# Patient Record
Sex: FEMALE | Race: WHITE | NOT HISPANIC OR LATINO | Employment: FULL TIME | URBAN - METROPOLITAN AREA
[De-identification: names, ages, dates, MRNs, and addresses within clinical notes are randomized per-mention and may not be internally consistent; named-entity substitution may affect disease eponyms.]

---

## 2021-08-17 LAB
EXTERNAL ABO GROUPING: NORMAL
EXTERNAL ANTIBODY SCREEN: NORMAL
EXTERNAL HEMOGLOBIN: 12.7 G/DL
EXTERNAL HEPATITIS B SURFACE ANTIGEN: NEGATIVE
EXTERNAL HIV-1/2 AB-AG: NORMAL
EXTERNAL PLATELET COUNT: 269 K/ΜL
EXTERNAL RH FACTOR: POSITIVE
EXTERNAL RUBELLA IGG QUANTITATION: NORMAL
EXTERNAL SYPHILIS RPR SCREEN: NORMAL

## 2021-09-08 LAB
EXTERNAL CHLAMYDIA SCREEN: NORMAL
EXTERNAL GONORRHEA SCREEN: NORMAL

## 2021-12-12 ENCOUNTER — APPOINTMENT (INPATIENT)
Dept: CT IMAGING | Facility: HOSPITAL | Age: 36
DRG: 781 | End: 2021-12-12
Payer: COMMERCIAL

## 2021-12-12 ENCOUNTER — HOSPITAL ENCOUNTER (EMERGENCY)
Facility: HOSPITAL | Age: 36
End: 2021-12-12
Attending: EMERGENCY MEDICINE | Admitting: EMERGENCY MEDICINE
Payer: COMMERCIAL

## 2021-12-12 ENCOUNTER — APPOINTMENT (EMERGENCY)
Dept: RADIOLOGY | Facility: HOSPITAL | Age: 36
End: 2021-12-12
Payer: COMMERCIAL

## 2021-12-12 ENCOUNTER — HOSPITAL ENCOUNTER (INPATIENT)
Facility: HOSPITAL | Age: 36
LOS: 7 days | Discharge: HOME | DRG: 781 | End: 2021-12-19
Attending: OBSTETRICS & GYNECOLOGY | Admitting: OBSTETRICS & GYNECOLOGY
Payer: COMMERCIAL

## 2021-12-12 VITALS
TEMPERATURE: 99.7 F | HEART RATE: 99 BPM | WEIGHT: 197 LBS | HEIGHT: 65 IN | OXYGEN SATURATION: 94 % | BODY MASS INDEX: 32.82 KG/M2 | DIASTOLIC BLOOD PRESSURE: 61 MMHG | SYSTOLIC BLOOD PRESSURE: 107 MMHG | RESPIRATION RATE: 26 BRPM

## 2021-12-12 DIAGNOSIS — R06.02 SOB (SHORTNESS OF BREATH): Primary | ICD-10-CM

## 2021-12-12 DIAGNOSIS — U07.1 COVID: Primary | ICD-10-CM

## 2021-12-12 DIAGNOSIS — U07.1 COVID-19: ICD-10-CM

## 2021-12-12 DIAGNOSIS — Z3A.23 23 WEEKS GESTATION OF PREGNANCY: ICD-10-CM

## 2021-12-12 DIAGNOSIS — E87.6 ACUTE HYPOKALEMIA: ICD-10-CM

## 2021-12-12 PROBLEM — E88.819 INSULIN RESISTANCE: Status: ACTIVE | Noted: 2021-12-12

## 2021-12-12 PROBLEM — R55 SYNCOPAL EPISODES: Status: ACTIVE | Noted: 2021-12-12

## 2021-12-12 PROBLEM — Q23.81 BICUSPID AORTIC VALVE: Status: ACTIVE | Noted: 2021-08-11

## 2021-12-12 PROBLEM — I82.A11 DVT OF RIGHT AXILLARY VEIN, ACUTE (HCC): Status: ACTIVE | Noted: 2021-08-12

## 2021-12-12 PROBLEM — K21.9 GASTROESOPHAGEAL REFLUX DISEASE WITHOUT ESOPHAGITIS: Status: ACTIVE | Noted: 2021-08-11

## 2021-12-12 PROBLEM — E88.81 INSULIN RESISTANCE: Status: ACTIVE | Noted: 2021-12-12

## 2021-12-12 PROBLEM — O98.512 COVID-19 AFFECTING PREGNANCY IN SECOND TRIMESTER: Status: ACTIVE | Noted: 2021-12-12

## 2021-12-12 PROBLEM — G54.0 THORACIC OUTLET SYNDROME: Status: ACTIVE | Noted: 2021-08-11

## 2021-12-12 PROBLEM — Q23.1 BICUSPID AORTIC VALVE: Status: ACTIVE | Noted: 2021-08-11

## 2021-12-12 LAB
ALBUMIN SERPL BCP-MCNC: 2.6 G/DL (ref 3.5–5)
ALP SERPL-CCNC: 88 U/L (ref 46–116)
ALT SERPL W P-5'-P-CCNC: 27 U/L (ref 12–78)
ANION GAP SERPL CALCULATED.3IONS-SCNC: 11 MMOL/L (ref 4–13)
AST SERPL W P-5'-P-CCNC: 18 U/L (ref 5–45)
BASOPHILS # BLD AUTO: 0.01 THOUSANDS/ΜL (ref 0–0.1)
BASOPHILS NFR BLD AUTO: 0 % (ref 0–1)
BILIRUB SERPL-MCNC: 0.4 MG/DL (ref 0.2–1)
BUN SERPL-MCNC: 6 MG/DL (ref 5–25)
CALCIUM ALBUM COR SERPL-MCNC: 9 MG/DL (ref 8.3–10.1)
CALCIUM SERPL-MCNC: 7.9 MG/DL (ref 8.3–10.1)
CHLORIDE SERPL-SCNC: 100 MMOL/L (ref 100–108)
CO2 SERPL-SCNC: 24 MMOL/L (ref 21–32)
CREAT SERPL-MCNC: 0.57 MG/DL (ref 0.6–1.3)
D DIMER PPP FEU-MCNC: 0.89 UG/ML FEU
EOSINOPHIL # BLD AUTO: 0.01 THOUSAND/ΜL (ref 0–0.61)
EOSINOPHIL NFR BLD AUTO: 0 % (ref 0–6)
ERYTHROCYTE [DISTWIDTH] IN BLOOD BY AUTOMATED COUNT: 12.9 % (ref 11.6–15.1)
GFR SERPL CREATININE-BSD FRML MDRD: 119 ML/MIN/1.73SQ M
GLUCOSE SERPL-MCNC: 104 MG/DL (ref 65–140)
GLUCOSE SERPL-MCNC: 120 MG/DL (ref 65–140)
HCT VFR BLD AUTO: 35.3 % (ref 34.8–46.1)
HGB BLD-MCNC: 11.7 G/DL (ref 11.5–15.4)
IMM GRANULOCYTES # BLD AUTO: 0.05 THOUSAND/UL (ref 0–0.2)
IMM GRANULOCYTES NFR BLD AUTO: 1 % (ref 0–2)
INR PPP: 1.04 (ref 0.84–1.19)
LYMPHOCYTES # BLD AUTO: 0.68 THOUSANDS/ΜL (ref 0.6–4.47)
LYMPHOCYTES NFR BLD AUTO: 8 % (ref 14–44)
MAGNESIUM SERPL-MCNC: 1.7 MG/DL (ref 1.6–2.6)
MCH RBC QN AUTO: 30.9 PG (ref 26.8–34.3)
MCHC RBC AUTO-ENTMCNC: 33.1 G/DL (ref 31.4–37.4)
MCV RBC AUTO: 93 FL (ref 82–98)
MONOCYTES # BLD AUTO: 0.65 THOUSAND/ΜL (ref 0.17–1.22)
MONOCYTES NFR BLD AUTO: 7 % (ref 4–12)
NEUTROPHILS # BLD AUTO: 7.69 THOUSANDS/ΜL (ref 1.85–7.62)
NEUTS SEG NFR BLD AUTO: 84 % (ref 43–75)
NRBC BLD AUTO-RTO: 0 /100 WBCS
PLATELET # BLD AUTO: 185 THOUSANDS/UL (ref 149–390)
PMV BLD AUTO: 10.9 FL (ref 8.9–12.7)
POTASSIUM SERPL-SCNC: 3.3 MMOL/L (ref 3.5–5.3)
PROT SERPL-MCNC: 6.8 G/DL (ref 6.4–8.2)
PROTHROMBIN TIME: 13.5 SECONDS (ref 11.6–14.5)
RBC # BLD AUTO: 3.79 MILLION/UL (ref 3.81–5.12)
SODIUM SERPL-SCNC: 135 MMOL/L (ref 136–145)
TSH SERPL DL<=0.05 MIU/L-ACNC: 0.84 UIU/ML (ref 0.36–3.74)
WBC # BLD AUTO: 9.09 THOUSAND/UL (ref 4.31–10.16)

## 2021-12-12 PROCEDURE — 80053 COMPREHEN METABOLIC PANEL: CPT | Performed by: EMERGENCY MEDICINE

## 2021-12-12 PROCEDURE — 84443 ASSAY THYROID STIM HORMONE: CPT | Performed by: EMERGENCY MEDICINE

## 2021-12-12 PROCEDURE — 83735 ASSAY OF MAGNESIUM: CPT | Performed by: EMERGENCY MEDICINE

## 2021-12-12 PROCEDURE — 85610 PROTHROMBIN TIME: CPT | Performed by: EMERGENCY MEDICINE

## 2021-12-12 PROCEDURE — NC001 PR NO CHARGE: Performed by: OBSTETRICS & GYNECOLOGY

## 2021-12-12 PROCEDURE — 82948 REAGENT STRIP/BLOOD GLUCOSE: CPT

## 2021-12-12 PROCEDURE — 99285 EMERGENCY DEPT VISIT HI MDM: CPT

## 2021-12-12 PROCEDURE — 76815 OB US LIMITED FETUS(S): CPT | Performed by: EMERGENCY MEDICINE

## 2021-12-12 PROCEDURE — 96374 THER/PROPH/DIAG INJ IV PUSH: CPT

## 2021-12-12 PROCEDURE — 96361 HYDRATE IV INFUSION ADD-ON: CPT

## 2021-12-12 PROCEDURE — 93005 ELECTROCARDIOGRAM TRACING: CPT

## 2021-12-12 PROCEDURE — 71045 X-RAY EXAM CHEST 1 VIEW: CPT

## 2021-12-12 PROCEDURE — 36415 COLL VENOUS BLD VENIPUNCTURE: CPT | Performed by: EMERGENCY MEDICINE

## 2021-12-12 PROCEDURE — XW033E5 INTRODUCTION OF REMDESIVIR ANTI-INFECTIVE INTO PERIPHERAL VEIN, PERCUTANEOUS APPROACH, NEW TECHNOLOGY GROUP 5: ICD-10-PCS | Performed by: OBSTETRICS & GYNECOLOGY

## 2021-12-12 PROCEDURE — 99285 EMERGENCY DEPT VISIT HI MDM: CPT | Performed by: EMERGENCY MEDICINE

## 2021-12-12 PROCEDURE — 99253 IP/OBS CNSLTJ NEW/EST LOW 45: CPT | Performed by: INTERNAL MEDICINE

## 2021-12-12 PROCEDURE — 85379 FIBRIN DEGRADATION QUANT: CPT | Performed by: EMERGENCY MEDICINE

## 2021-12-12 PROCEDURE — 85025 COMPLETE CBC W/AUTO DIFF WBC: CPT | Performed by: EMERGENCY MEDICINE

## 2021-12-12 PROCEDURE — 4A1HXCZ MONITORING OF PRODUCTS OF CONCEPTION, CARDIAC RATE, EXTERNAL APPROACH: ICD-10-PCS | Performed by: OBSTETRICS & GYNECOLOGY

## 2021-12-12 RX ORDER — DIPHENHYDRAMINE HYDROCHLORIDE 25 MG/1
CAPSULE ORAL
COMMUNITY
Start: 2021-08-01 | End: 2022-01-21

## 2021-12-12 RX ORDER — HEPARIN SODIUM 1000 [USP'U]/ML
6800 INJECTION, SOLUTION INTRAVENOUS; SUBCUTANEOUS
Status: DISCONTINUED | OUTPATIENT
Start: 2021-12-12 | End: 2021-12-13

## 2021-12-12 RX ORDER — HEPARIN SODIUM 1000 [USP'U]/ML
3400 INJECTION, SOLUTION INTRAVENOUS; SUBCUTANEOUS
Status: DISCONTINUED | OUTPATIENT
Start: 2021-12-12 | End: 2021-12-13

## 2021-12-12 RX ORDER — LEVOTHYROXINE SODIUM 0.05 MG/1
50 TABLET ORAL
Status: DISCONTINUED | OUTPATIENT
Start: 2021-12-13 | End: 2021-12-19 | Stop reason: HOSPADM

## 2021-12-12 RX ORDER — HEPARIN SODIUM 5000 [USP'U]/ML
5000 INJECTION, SOLUTION INTRAVENOUS; SUBCUTANEOUS EVERY 8 HOURS SCHEDULED
Status: DISCONTINUED | OUTPATIENT
Start: 2021-12-12 | End: 2021-12-12

## 2021-12-12 RX ORDER — METHYLPREDNISOLONE 16 MG/1
32 TABLET ORAL
Status: DISCONTINUED | OUTPATIENT
Start: 2021-12-13 | End: 2021-12-13

## 2021-12-12 RX ORDER — LEVOTHYROXINE SODIUM 0.05 MG/1
50 TABLET ORAL
COMMUNITY
Start: 2021-08-17 | End: 2022-02-18

## 2021-12-12 RX ORDER — ONDANSETRON 2 MG/ML
4 INJECTION INTRAMUSCULAR; INTRAVENOUS ONCE
Status: COMPLETED | OUTPATIENT
Start: 2021-12-12 | End: 2021-12-12

## 2021-12-12 RX ORDER — GUAIFENESIN 600 MG
600 TABLET, EXTENDED RELEASE 12 HR ORAL EVERY 12 HOURS SCHEDULED
Status: DISCONTINUED | OUTPATIENT
Start: 2021-12-12 | End: 2021-12-19 | Stop reason: HOSPADM

## 2021-12-12 RX ORDER — PANTOPRAZOLE SODIUM 20 MG/1
20 TABLET, DELAYED RELEASE ORAL
Status: DISCONTINUED | OUTPATIENT
Start: 2021-12-13 | End: 2021-12-19 | Stop reason: HOSPADM

## 2021-12-12 RX ORDER — DEXAMETHASONE SODIUM PHOSPHATE 4 MG/ML
6 INJECTION, SOLUTION INTRA-ARTICULAR; INTRALESIONAL; INTRAMUSCULAR; INTRAVENOUS; SOFT TISSUE EVERY 24 HOURS
Status: DISCONTINUED | OUTPATIENT
Start: 2021-12-12 | End: 2021-12-19 | Stop reason: HOSPADM

## 2021-12-12 RX ORDER — OMEPRAZOLE 20 MG/1
20 CAPSULE, DELAYED RELEASE ORAL
COMMUNITY
Start: 2021-01-01

## 2021-12-12 RX ORDER — HEPARIN SODIUM 1000 [USP'U]/ML
6800 INJECTION, SOLUTION INTRAVENOUS; SUBCUTANEOUS ONCE
Status: COMPLETED | OUTPATIENT
Start: 2021-12-13 | End: 2021-12-13

## 2021-12-12 RX ORDER — ACETAMINOPHEN 325 MG/1
975 TABLET ORAL ONCE
Status: COMPLETED | OUTPATIENT
Start: 2021-12-12 | End: 2021-12-12

## 2021-12-12 RX ORDER — POTASSIUM CHLORIDE 20 MEQ/1
20 TABLET, EXTENDED RELEASE ORAL ONCE
Status: COMPLETED | OUTPATIENT
Start: 2021-12-12 | End: 2021-12-12

## 2021-12-12 RX ORDER — DIPHENHYDRAMINE HCL 25 MG
50 TABLET ORAL
Status: DISCONTINUED | OUTPATIENT
Start: 2021-12-13 | End: 2021-12-13

## 2021-12-12 RX ORDER — AZELASTINE HYDROCHLORIDE, FLUTICASONE PROPIONATE 137; 50 UG/1; UG/1
SPRAY, METERED NASAL
COMMUNITY
Start: 2021-06-25 | End: 2022-01-21

## 2021-12-12 RX ORDER — HEPARIN SODIUM 10000 [USP'U]/100ML
3-30 INJECTION, SOLUTION INTRAVENOUS
Status: DISCONTINUED | OUTPATIENT
Start: 2021-12-13 | End: 2021-12-13

## 2021-12-12 RX ADMIN — SODIUM CHLORIDE 1000 ML: 0.9 INJECTION, SOLUTION INTRAVENOUS at 14:01

## 2021-12-12 RX ADMIN — ACETAMINOPHEN 975 MG: 325 TABLET, FILM COATED ORAL at 14:00

## 2021-12-12 RX ADMIN — ONDANSETRON 4 MG: 2 INJECTION INTRAMUSCULAR; INTRAVENOUS at 14:00

## 2021-12-12 RX ADMIN — POTASSIUM CHLORIDE 20 MEQ: 1500 TABLET, EXTENDED RELEASE ORAL at 15:17

## 2021-12-13 ENCOUNTER — APPOINTMENT (INPATIENT)
Dept: NON INVASIVE DIAGNOSTICS | Facility: HOSPITAL | Age: 36
DRG: 781 | End: 2021-12-13
Payer: COMMERCIAL

## 2021-12-13 ENCOUNTER — APPOINTMENT (INPATIENT)
Dept: VASCULAR ULTRASOUND | Facility: HOSPITAL | Age: 36
DRG: 781 | End: 2021-12-13
Payer: COMMERCIAL

## 2021-12-13 ENCOUNTER — APPOINTMENT (INPATIENT)
Dept: RADIOLOGY | Facility: HOSPITAL | Age: 36
DRG: 781 | End: 2021-12-13
Payer: COMMERCIAL

## 2021-12-13 PROBLEM — Z86.718 HISTORY OF DVT (DEEP VEIN THROMBOSIS): Status: ACTIVE | Noted: 2021-08-12

## 2021-12-13 LAB
2HR DELTA HS TROPONIN: -1 NG/L
4HR DELTA HS TROPONIN: -1 NG/L
ABO GROUP BLD: NORMAL
ABO GROUP BLD: NORMAL
ALBUMIN SERPL BCP-MCNC: 2.1 G/DL (ref 3.5–5)
ALP SERPL-CCNC: 71 U/L (ref 46–116)
ALT SERPL W P-5'-P-CCNC: 22 U/L (ref 12–78)
ANION GAP SERPL CALCULATED.3IONS-SCNC: 10 MMOL/L (ref 4–13)
AORTIC ROOT: 2.7 CM
AORTIC VALVE MEAN VELOCITY: 14.6 M/S
APICAL FOUR CHAMBER EJECTION FRACTION: 57 %
APTT PPP: 109 SECONDS (ref 23–37)
APTT PPP: 33 SECONDS (ref 23–37)
ASCENDING AORTA: 3 CM
AST SERPL W P-5'-P-CCNC: 21 U/L (ref 5–45)
ATRIAL RATE: 104 BPM
AV AREA BY CONTINUOUS VTI: 1.7 CM2
AV AREA PEAK VELOCITY: 1.9 CM2
AV LVOT MEAN GRADIENT: 4 MMHG
AV LVOT PEAK GRADIENT: 5 MMHG
AV MEAN GRADIENT: 9 MMHG
AV PEAK GRADIENT: 14 MMHG
AV VALVE AREA: 1.74 CM2
BASOPHILS # BLD AUTO: 0.01 THOUSANDS/ΜL (ref 0–0.1)
BASOPHILS NFR BLD AUTO: 0 % (ref 0–1)
BILIRUB SERPL-MCNC: 0.4 MG/DL (ref 0.2–1)
BLD GP AB SCN SERPL QL: NEGATIVE
BUN SERPL-MCNC: 5 MG/DL (ref 5–25)
CALCIUM ALBUM COR SERPL-MCNC: 9.4 MG/DL (ref 8.3–10.1)
CALCIUM SERPL-MCNC: 7.9 MG/DL (ref 8.3–10.1)
CARDIAC TROPONIN I PNL SERPL HS: 2 NG/L
CARDIAC TROPONIN I PNL SERPL HS: 2 NG/L
CARDIAC TROPONIN I PNL SERPL HS: 3 NG/L
CHLORIDE SERPL-SCNC: 103 MMOL/L (ref 100–108)
CK SERPL-CCNC: 60 U/L (ref 26–192)
CO2 SERPL-SCNC: 21 MMOL/L (ref 21–32)
CREAT SERPL-MCNC: 0.6 MG/DL (ref 0.6–1.3)
CRP SERPL QL: 143 MG/L
DOP CALC AO VTI: 40.64 CM
DOP CALC LVOT AREA: 3.14 CM2
DOP CALC LVOT DIAMETER: 2 CM
DOP CALC LVOT PEAK VEL VTI: 22.56 CM
DOP CALC LVOT PEAK VEL: 1.16 M/S
DOP CALC LVOT STROKE INDEX: 36 ML/M2
DOP CALC LVOT STROKE VOLUME: 70.84 CM3
E WAVE DECELERATION TIME: 133 MS
EOSINOPHIL # BLD AUTO: 0.01 THOUSAND/ΜL (ref 0–0.61)
EOSINOPHIL NFR BLD AUTO: 0 % (ref 0–6)
ERYTHROCYTE [DISTWIDTH] IN BLOOD BY AUTOMATED COUNT: 13.1 % (ref 11.6–15.1)
FRACTIONAL SHORTENING: 27 % (ref 28–44)
GFR SERPL CREATININE-BSD FRML MDRD: 117 ML/MIN/1.73SQ M
GLUCOSE SERPL-MCNC: 118 MG/DL (ref 65–140)
GLUCOSE SERPL-MCNC: 127 MG/DL (ref 65–140)
GLUCOSE SERPL-MCNC: 135 MG/DL (ref 65–140)
GLUCOSE SERPL-MCNC: 169 MG/DL (ref 65–140)
GLUCOSE SERPL-MCNC: 171 MG/DL (ref 65–140)
GLUCOSE SERPL-MCNC: 177 MG/DL (ref 65–140)
GLUCOSE SERPL-MCNC: 199 MG/DL (ref 65–140)
HCT VFR BLD AUTO: 30.7 % (ref 34.8–46.1)
HGB BLD-MCNC: 10.2 G/DL (ref 11.5–15.4)
IMM GRANULOCYTES # BLD AUTO: 0.05 THOUSAND/UL (ref 0–0.2)
IMM GRANULOCYTES NFR BLD AUTO: 1 % (ref 0–2)
INTERVENTRICULAR SEPTUM IN DIASTOLE (PARASTERNAL SHORT AXIS VIEW): 0.8 CM
LEFT ATRIUM AREA SYSTOLE SINGLE PLANE A4C: 19.8 CM2
LEFT INTERNAL DIMENSION IN SYSTOLE: 3.8 CM (ref 2.1–4)
LEFT VENTRICULAR INTERNAL DIMENSION IN DIASTOLE: 5.2 CM (ref 5.1–7.59)
LEFT VENTRICULAR POSTERIOR WALL IN END DIASTOLE: 0.8 CM
LEFT VENTRICULAR STROKE VOLUME: 69 ML
LYMPHOCYTES # BLD AUTO: 0.64 THOUSANDS/ΜL (ref 0.6–4.47)
LYMPHOCYTES NFR BLD AUTO: 10 % (ref 14–44)
MCH RBC QN AUTO: 31.6 PG (ref 26.8–34.3)
MCHC RBC AUTO-ENTMCNC: 33.2 G/DL (ref 31.4–37.4)
MCV RBC AUTO: 95 FL (ref 82–98)
MONOCYTES # BLD AUTO: 0.47 THOUSAND/ΜL (ref 0.17–1.22)
MONOCYTES NFR BLD AUTO: 8 % (ref 4–12)
MV E'TISSUE VEL-SEP: 10 CM/S
MV PEAK A VEL: 0.74 M/S
MV PEAK E VEL: 104 CM/S
MV STENOSIS PRESSURE HALF TIME: 0 MS
NEUTROPHILS # BLD AUTO: 5.07 THOUSANDS/ΜL (ref 1.85–7.62)
NEUTS SEG NFR BLD AUTO: 81 % (ref 43–75)
NRBC BLD AUTO-RTO: 0 /100 WBCS
NT-PROBNP SERPL-MCNC: 77 PG/ML
P AXIS: 12 DEGREES
PLATELET # BLD AUTO: 161 THOUSANDS/UL (ref 149–390)
PMV BLD AUTO: 10.6 FL (ref 8.9–12.7)
POTASSIUM SERPL-SCNC: 3.5 MMOL/L (ref 3.5–5.3)
PR INTERVAL: 128 MS
PROCALCITONIN SERPL-MCNC: <0.05 NG/ML
PROCALCITONIN SERPL-MCNC: <0.05 NG/ML
PROT SERPL-MCNC: 5.9 G/DL (ref 6.4–8.2)
QRS AXIS: 19 DEGREES
QRSD INTERVAL: 82 MS
QT INTERVAL: 328 MS
QTC INTERVAL: 431 MS
RBC # BLD AUTO: 3.23 MILLION/UL (ref 3.81–5.12)
RH BLD: POSITIVE
RH BLD: POSITIVE
RIGHT ATRIUM AREA SYSTOLE A4C: 15 CM2
SL CV LV EF: 65
SL CV PED ECHO LEFT VENTRICLE DIASTOLIC VOLUME (MOD BIPLANE) 2D: 132 ML
SL CV PED ECHO LEFT VENTRICLE SYSTOLIC VOLUME (MOD BIPLANE) 2D: 63 ML
SODIUM SERPL-SCNC: 134 MMOL/L (ref 136–145)
SPECIMEN EXPIRATION DATE: NORMAL
T WAVE AXIS: -5 DEGREES
TRICUSPID VALVE S': 1.2 CM/S
VENTRICULAR RATE: 104 BPM
WBC # BLD AUTO: 6.25 THOUSAND/UL (ref 4.31–10.16)
Z-SCORE OF LEFT VENTRICULAR DIMENSION IN END SYSTOLE: -1.79

## 2021-12-13 PROCEDURE — 82550 ASSAY OF CK (CPK): CPT

## 2021-12-13 PROCEDURE — 71045 X-RAY EXAM CHEST 1 VIEW: CPT

## 2021-12-13 PROCEDURE — 85730 THROMBOPLASTIN TIME PARTIAL: CPT

## 2021-12-13 PROCEDURE — 93970 EXTREMITY STUDY: CPT

## 2021-12-13 PROCEDURE — 93970 EXTREMITY STUDY: CPT | Performed by: SURGERY

## 2021-12-13 PROCEDURE — 86900 BLOOD TYPING SEROLOGIC ABO: CPT

## 2021-12-13 PROCEDURE — 83880 ASSAY OF NATRIURETIC PEPTIDE: CPT

## 2021-12-13 PROCEDURE — 86901 BLOOD TYPING SEROLOGIC RH(D): CPT

## 2021-12-13 PROCEDURE — 93005 ELECTROCARDIOGRAM TRACING: CPT

## 2021-12-13 PROCEDURE — 85025 COMPLETE CBC W/AUTO DIFF WBC: CPT

## 2021-12-13 PROCEDURE — 93325 DOPPLER ECHO COLOR FLOW MAPG: CPT | Performed by: INTERNAL MEDICINE

## 2021-12-13 PROCEDURE — 93321 DOPPLER ECHO F-UP/LMTD STD: CPT | Performed by: INTERNAL MEDICINE

## 2021-12-13 PROCEDURE — 84145 PROCALCITONIN (PCT): CPT

## 2021-12-13 PROCEDURE — NC001 PR NO CHARGE: Performed by: OBSTETRICS & GYNECOLOGY

## 2021-12-13 PROCEDURE — 86850 RBC ANTIBODY SCREEN: CPT

## 2021-12-13 PROCEDURE — 93010 ELECTROCARDIOGRAM REPORT: CPT | Performed by: INTERNAL MEDICINE

## 2021-12-13 PROCEDURE — 99254 IP/OBS CNSLTJ NEW/EST MOD 60: CPT | Performed by: OBSTETRICS & GYNECOLOGY

## 2021-12-13 PROCEDURE — 99232 SBSQ HOSP IP/OBS MODERATE 35: CPT | Performed by: INTERNAL MEDICINE

## 2021-12-13 PROCEDURE — 93321 DOPPLER ECHO F-UP/LMTD STD: CPT

## 2021-12-13 PROCEDURE — 93325 DOPPLER ECHO COLOR FLOW MAPG: CPT

## 2021-12-13 PROCEDURE — 86140 C-REACTIVE PROTEIN: CPT

## 2021-12-13 PROCEDURE — 93308 TTE F-UP OR LMTD: CPT

## 2021-12-13 PROCEDURE — 93308 TTE F-UP OR LMTD: CPT | Performed by: INTERNAL MEDICINE

## 2021-12-13 PROCEDURE — 84484 ASSAY OF TROPONIN QUANT: CPT

## 2021-12-13 PROCEDURE — 82948 REAGENT STRIP/BLOOD GLUCOSE: CPT

## 2021-12-13 PROCEDURE — 80053 COMPREHEN METABOLIC PANEL: CPT

## 2021-12-13 RX ORDER — ATORVASTATIN CALCIUM 40 MG/1
40 TABLET, FILM COATED ORAL
Status: CANCELLED | OUTPATIENT
Start: 2021-12-13

## 2021-12-13 RX ORDER — GUAIFENESIN/DEXTROMETHORPHAN 100-10MG/5
10 SYRUP ORAL EVERY 4 HOURS PRN
Status: DISCONTINUED | OUTPATIENT
Start: 2021-12-13 | End: 2021-12-13

## 2021-12-13 RX ORDER — ECHINACEA PURPUREA EXTRACT 125 MG
1 TABLET ORAL
Status: DISCONTINUED | OUTPATIENT
Start: 2021-12-13 | End: 2021-12-19 | Stop reason: HOSPADM

## 2021-12-13 RX ORDER — BENZONATATE 100 MG/1
100 CAPSULE ORAL 3 TIMES DAILY PRN
Status: DISCONTINUED | OUTPATIENT
Start: 2021-12-13 | End: 2021-12-19 | Stop reason: HOSPADM

## 2021-12-13 RX ORDER — INSULIN GLARGINE 100 [IU]/ML
20 INJECTION, SOLUTION SUBCUTANEOUS
Status: DISCONTINUED | OUTPATIENT
Start: 2021-12-13 | End: 2021-12-19 | Stop reason: HOSPADM

## 2021-12-13 RX ORDER — GUAIFENESIN 100 MG/5ML
200 SOLUTION ORAL EVERY 4 HOURS PRN
Status: DISCONTINUED | OUTPATIENT
Start: 2021-12-13 | End: 2021-12-19 | Stop reason: HOSPADM

## 2021-12-13 RX ORDER — ACETAMINOPHEN 325 MG/1
650 TABLET ORAL EVERY 6 HOURS PRN
Status: DISCONTINUED | OUTPATIENT
Start: 2021-12-13 | End: 2021-12-19 | Stop reason: HOSPADM

## 2021-12-13 RX ADMIN — GUAIFENESIN 600 MG: 600 TABLET, EXTENDED RELEASE ORAL at 08:21

## 2021-12-13 RX ADMIN — GUAIFENESIN 600 MG: 600 TABLET, EXTENDED RELEASE ORAL at 21:33

## 2021-12-13 RX ADMIN — ACETAMINOPHEN 650 MG: 325 TABLET, FILM COATED ORAL at 04:10

## 2021-12-13 RX ADMIN — HEPARIN SODIUM 18 UNITS/KG/HR: 10000 INJECTION, SOLUTION INTRAVENOUS at 01:31

## 2021-12-13 RX ADMIN — DEXAMETHASONE SODIUM PHOSPHATE 6 MG: 4 INJECTION INTRA-ARTICULAR; INTRALESIONAL; INTRAMUSCULAR; INTRAVENOUS; SOFT TISSUE at 12:28

## 2021-12-13 RX ADMIN — INSULIN GLARGINE 20 UNITS: 100 INJECTION, SOLUTION SUBCUTANEOUS at 21:40

## 2021-12-13 RX ADMIN — Medication 1 TABLET: at 05:31

## 2021-12-13 RX ADMIN — ENOXAPARIN SODIUM 30 MG: 30 INJECTION SUBCUTANEOUS at 14:49

## 2021-12-13 RX ADMIN — BENZONATATE 100 MG: 100 CAPSULE ORAL at 14:34

## 2021-12-13 RX ADMIN — BENZONATATE 100 MG: 100 CAPSULE ORAL at 21:39

## 2021-12-13 RX ADMIN — HEPARIN SODIUM 6800 UNITS: 1000 INJECTION INTRAVENOUS; SUBCUTANEOUS at 01:22

## 2021-12-13 RX ADMIN — LEVOTHYROXINE SODIUM 50 MCG: 50 TABLET ORAL at 05:31

## 2021-12-13 RX ADMIN — REMDESIVIR 200 MG: 100 INJECTION, POWDER, LYOPHILIZED, FOR SOLUTION INTRAVENOUS at 00:34

## 2021-12-13 RX ADMIN — PANTOPRAZOLE SODIUM 20 MG: 20 TABLET, DELAYED RELEASE ORAL at 05:31

## 2021-12-13 RX ADMIN — REMDESIVIR 100 MG: 100 INJECTION, POWDER, LYOPHILIZED, FOR SOLUTION INTRAVENOUS at 21:33

## 2021-12-13 RX ADMIN — METHYLPREDNISOLONE 32 MG: 16 TABLET ORAL at 01:15

## 2021-12-14 ENCOUNTER — APPOINTMENT (INPATIENT)
Dept: VASCULAR ULTRASOUND | Facility: HOSPITAL | Age: 36
DRG: 781 | End: 2021-12-14
Payer: COMMERCIAL

## 2021-12-14 LAB
ALBUMIN SERPL BCP-MCNC: 2.1 G/DL (ref 3.5–5)
ALP SERPL-CCNC: 76 U/L (ref 46–116)
ALT SERPL W P-5'-P-CCNC: 22 U/L (ref 12–78)
ANION GAP SERPL CALCULATED.3IONS-SCNC: 11 MMOL/L (ref 4–13)
AST SERPL W P-5'-P-CCNC: 20 U/L (ref 5–45)
BASOPHILS # BLD AUTO: 0.01 THOUSANDS/ΜL (ref 0–0.1)
BASOPHILS NFR BLD AUTO: 0 % (ref 0–1)
BILIRUB SERPL-MCNC: 0.24 MG/DL (ref 0.2–1)
BUN SERPL-MCNC: 10 MG/DL (ref 5–25)
CALCIUM ALBUM COR SERPL-MCNC: 9.4 MG/DL (ref 8.3–10.1)
CALCIUM SERPL-MCNC: 7.9 MG/DL (ref 8.3–10.1)
CHLORIDE SERPL-SCNC: 104 MMOL/L (ref 100–108)
CO2 SERPL-SCNC: 22 MMOL/L (ref 21–32)
CREAT SERPL-MCNC: 0.46 MG/DL (ref 0.6–1.3)
CRP SERPL QL: 118 MG/L
D DIMER PPP FEU-MCNC: 0.48 UG/ML FEU
EOSINOPHIL # BLD AUTO: 0 THOUSAND/ΜL (ref 0–0.61)
EOSINOPHIL NFR BLD AUTO: 0 % (ref 0–6)
ERYTHROCYTE [DISTWIDTH] IN BLOOD BY AUTOMATED COUNT: 13 % (ref 11.6–15.1)
GFR SERPL CREATININE-BSD FRML MDRD: 127 ML/MIN/1.73SQ M
GLUCOSE SERPL-MCNC: 109 MG/DL (ref 65–140)
GLUCOSE SERPL-MCNC: 109 MG/DL (ref 65–140)
GLUCOSE SERPL-MCNC: 117 MG/DL (ref 65–140)
GLUCOSE SERPL-MCNC: 121 MG/DL (ref 65–140)
GLUCOSE SERPL-MCNC: 156 MG/DL (ref 65–140)
HCT VFR BLD AUTO: 33.2 % (ref 34.8–46.1)
HGB BLD-MCNC: 11.1 G/DL (ref 11.5–15.4)
IMM GRANULOCYTES # BLD AUTO: 0.08 THOUSAND/UL (ref 0–0.2)
IMM GRANULOCYTES NFR BLD AUTO: 1 % (ref 0–2)
LYMPHOCYTES # BLD AUTO: 1.4 THOUSANDS/ΜL (ref 0.6–4.47)
LYMPHOCYTES NFR BLD AUTO: 14 % (ref 14–44)
MCH RBC QN AUTO: 31.7 PG (ref 26.8–34.3)
MCHC RBC AUTO-ENTMCNC: 33.4 G/DL (ref 31.4–37.4)
MCV RBC AUTO: 95 FL (ref 82–98)
MONOCYTES # BLD AUTO: 0.77 THOUSAND/ΜL (ref 0.17–1.22)
MONOCYTES NFR BLD AUTO: 8 % (ref 4–12)
NEUTROPHILS # BLD AUTO: 8.04 THOUSANDS/ΜL (ref 1.85–7.62)
NEUTS SEG NFR BLD AUTO: 77 % (ref 43–75)
NRBC BLD AUTO-RTO: 0 /100 WBCS
PLATELET # BLD AUTO: 182 THOUSANDS/UL (ref 149–390)
PMV BLD AUTO: 10.2 FL (ref 8.9–12.7)
POTASSIUM SERPL-SCNC: 3.5 MMOL/L (ref 3.5–5.3)
PROT SERPL-MCNC: 6 G/DL (ref 6.4–8.2)
RBC # BLD AUTO: 3.5 MILLION/UL (ref 3.81–5.12)
SODIUM SERPL-SCNC: 137 MMOL/L (ref 136–145)
WBC # BLD AUTO: 10.3 THOUSAND/UL (ref 4.31–10.16)

## 2021-12-14 PROCEDURE — 99223 1ST HOSP IP/OBS HIGH 75: CPT | Performed by: INTERNAL MEDICINE

## 2021-12-14 PROCEDURE — 85025 COMPLETE CBC W/AUTO DIFF WBC: CPT | Performed by: INTERNAL MEDICINE

## 2021-12-14 PROCEDURE — NC001 PR NO CHARGE: Performed by: OBSTETRICS & GYNECOLOGY

## 2021-12-14 PROCEDURE — XW0DXM6 INTRODUCTION OF BARICITINIB INTO MOUTH AND PHARYNX, EXTERNAL APPROACH, NEW TECHNOLOGY GROUP 6: ICD-10-PCS | Performed by: INTERNAL MEDICINE

## 2021-12-14 PROCEDURE — 80053 COMPREHEN METABOLIC PANEL: CPT | Performed by: INTERNAL MEDICINE

## 2021-12-14 PROCEDURE — 93970 EXTREMITY STUDY: CPT | Performed by: SURGERY

## 2021-12-14 PROCEDURE — 93970 EXTREMITY STUDY: CPT

## 2021-12-14 PROCEDURE — 86140 C-REACTIVE PROTEIN: CPT | Performed by: INTERNAL MEDICINE

## 2021-12-14 PROCEDURE — 99232 SBSQ HOSP IP/OBS MODERATE 35: CPT | Performed by: HOSPITALIST

## 2021-12-14 PROCEDURE — 85379 FIBRIN DEGRADATION QUANT: CPT | Performed by: INTERNAL MEDICINE

## 2021-12-14 PROCEDURE — 82948 REAGENT STRIP/BLOOD GLUCOSE: CPT

## 2021-12-14 PROCEDURE — 99233 SBSQ HOSP IP/OBS HIGH 50: CPT | Performed by: OBSTETRICS & GYNECOLOGY

## 2021-12-14 RX ADMIN — Medication 1 TABLET: at 05:55

## 2021-12-14 RX ADMIN — BENZONATATE 100 MG: 100 CAPSULE ORAL at 03:44

## 2021-12-14 RX ADMIN — GUAIFENESIN 600 MG: 600 TABLET, EXTENDED RELEASE ORAL at 21:05

## 2021-12-14 RX ADMIN — LEVOTHYROXINE SODIUM 50 MCG: 50 TABLET ORAL at 05:55

## 2021-12-14 RX ADMIN — INSULIN GLARGINE 20 UNITS: 100 INJECTION, SOLUTION SUBCUTANEOUS at 21:07

## 2021-12-14 RX ADMIN — DEXAMETHASONE SODIUM PHOSPHATE 6 MG: 4 INJECTION INTRA-ARTICULAR; INTRALESIONAL; INTRAMUSCULAR; INTRAVENOUS; SOFT TISSUE at 13:52

## 2021-12-14 RX ADMIN — REMDESIVIR 100 MG: 100 INJECTION, POWDER, LYOPHILIZED, FOR SOLUTION INTRAVENOUS at 23:49

## 2021-12-14 RX ADMIN — ENOXAPARIN SODIUM 30 MG: 30 INJECTION SUBCUTANEOUS at 00:54

## 2021-12-14 RX ADMIN — GUAIFENESIN 600 MG: 600 TABLET, EXTENDED RELEASE ORAL at 08:45

## 2021-12-14 RX ADMIN — ENOXAPARIN SODIUM 30 MG: 30 INJECTION SUBCUTANEOUS at 10:38

## 2021-12-14 RX ADMIN — ACETAMINOPHEN 650 MG: 325 TABLET, FILM COATED ORAL at 10:38

## 2021-12-14 RX ADMIN — PANTOPRAZOLE SODIUM 20 MG: 20 TABLET, DELAYED RELEASE ORAL at 05:55

## 2021-12-14 RX ADMIN — BARICITINIB 4 MG: 2 TABLET, FILM COATED ORAL at 13:53

## 2021-12-14 RX ADMIN — ENOXAPARIN SODIUM 30 MG: 30 INJECTION SUBCUTANEOUS at 23:49

## 2021-12-15 LAB
ALBUMIN SERPL BCP-MCNC: 2 G/DL (ref 3.5–5)
ALP SERPL-CCNC: 78 U/L (ref 46–116)
ALT SERPL W P-5'-P-CCNC: 26 U/L (ref 12–78)
ANION GAP SERPL CALCULATED.3IONS-SCNC: 7 MMOL/L (ref 4–13)
AST SERPL W P-5'-P-CCNC: 23 U/L (ref 5–45)
ATRIAL RATE: 105 BPM
BILIRUB SERPL-MCNC: 0.33 MG/DL (ref 0.2–1)
BUN SERPL-MCNC: 10 MG/DL (ref 5–25)
CALCIUM ALBUM COR SERPL-MCNC: 9.6 MG/DL (ref 8.3–10.1)
CALCIUM SERPL-MCNC: 8 MG/DL (ref 8.3–10.1)
CHLORIDE SERPL-SCNC: 105 MMOL/L (ref 100–108)
CO2 SERPL-SCNC: 27 MMOL/L (ref 21–32)
CREAT SERPL-MCNC: 0.57 MG/DL (ref 0.6–1.3)
CRP SERPL QL: 130 MG/L
D DIMER PPP FEU-MCNC: 0.43 UG/ML FEU
ERYTHROCYTE [DISTWIDTH] IN BLOOD BY AUTOMATED COUNT: 13 % (ref 11.6–15.1)
GFR SERPL CREATININE-BSD FRML MDRD: 118 ML/MIN/1.73SQ M
GLUCOSE SERPL-MCNC: 112 MG/DL (ref 65–140)
GLUCOSE SERPL-MCNC: 152 MG/DL (ref 65–140)
GLUCOSE SERPL-MCNC: 153 MG/DL (ref 65–140)
GLUCOSE SERPL-MCNC: 82 MG/DL (ref 65–140)
GLUCOSE SERPL-MCNC: 91 MG/DL (ref 65–140)
HCT VFR BLD AUTO: 31.6 % (ref 34.8–46.1)
HGB BLD-MCNC: 10.4 G/DL (ref 11.5–15.4)
MCH RBC QN AUTO: 31.7 PG (ref 26.8–34.3)
MCHC RBC AUTO-ENTMCNC: 32.9 G/DL (ref 31.4–37.4)
MCV RBC AUTO: 96 FL (ref 82–98)
P AXIS: 15 DEGREES
PLATELET # BLD AUTO: 194 THOUSANDS/UL (ref 149–390)
PMV BLD AUTO: 10.2 FL (ref 8.9–12.7)
POTASSIUM SERPL-SCNC: 3.7 MMOL/L (ref 3.5–5.3)
PR INTERVAL: 124 MS
PROT SERPL-MCNC: 5.8 G/DL (ref 6.4–8.2)
QRS AXIS: 21 DEGREES
QRSD INTERVAL: 82 MS
QT INTERVAL: 336 MS
QTC INTERVAL: 444 MS
RBC # BLD AUTO: 3.28 MILLION/UL (ref 3.81–5.12)
SODIUM SERPL-SCNC: 139 MMOL/L (ref 136–145)
T WAVE AXIS: -21 DEGREES
VENTRICULAR RATE: 105 BPM
WBC # BLD AUTO: 10.27 THOUSAND/UL (ref 4.31–10.16)

## 2021-12-15 PROCEDURE — 86140 C-REACTIVE PROTEIN: CPT

## 2021-12-15 PROCEDURE — 93010 ELECTROCARDIOGRAM REPORT: CPT | Performed by: INTERNAL MEDICINE

## 2021-12-15 PROCEDURE — 99233 SBSQ HOSP IP/OBS HIGH 50: CPT | Performed by: INTERNAL MEDICINE

## 2021-12-15 PROCEDURE — NC001 PR NO CHARGE: Performed by: OBSTETRICS & GYNECOLOGY

## 2021-12-15 PROCEDURE — 80053 COMPREHEN METABOLIC PANEL: CPT

## 2021-12-15 PROCEDURE — 85379 FIBRIN DEGRADATION QUANT: CPT

## 2021-12-15 PROCEDURE — 82948 REAGENT STRIP/BLOOD GLUCOSE: CPT

## 2021-12-15 PROCEDURE — 85027 COMPLETE CBC AUTOMATED: CPT

## 2021-12-15 PROCEDURE — 99232 SBSQ HOSP IP/OBS MODERATE 35: CPT | Performed by: HOSPITALIST

## 2021-12-15 RX ORDER — POLYETHYLENE GLYCOL 3350 17 G/17G
17 POWDER, FOR SOLUTION ORAL DAILY
Status: DISCONTINUED | OUTPATIENT
Start: 2021-12-15 | End: 2021-12-19 | Stop reason: HOSPADM

## 2021-12-15 RX ADMIN — LEVOTHYROXINE SODIUM 50 MCG: 50 TABLET ORAL at 06:24

## 2021-12-15 RX ADMIN — ACETAMINOPHEN 650 MG: 325 TABLET, FILM COATED ORAL at 09:22

## 2021-12-15 RX ADMIN — POLYETHYLENE GLYCOL 3350 17 G: 17 POWDER, FOR SOLUTION ORAL at 09:22

## 2021-12-15 RX ADMIN — GUAIFENESIN 600 MG: 600 TABLET, EXTENDED RELEASE ORAL at 21:53

## 2021-12-15 RX ADMIN — GUAIFENESIN 600 MG: 600 TABLET, EXTENDED RELEASE ORAL at 08:21

## 2021-12-15 RX ADMIN — INSULIN GLARGINE 20 UNITS: 100 INJECTION, SOLUTION SUBCUTANEOUS at 21:53

## 2021-12-15 RX ADMIN — REMDESIVIR 100 MG: 100 INJECTION, POWDER, LYOPHILIZED, FOR SOLUTION INTRAVENOUS at 23:09

## 2021-12-15 RX ADMIN — ENOXAPARIN SODIUM 30 MG: 30 INJECTION SUBCUTANEOUS at 12:14

## 2021-12-15 RX ADMIN — DEXAMETHASONE SODIUM PHOSPHATE 6 MG: 4 INJECTION INTRA-ARTICULAR; INTRALESIONAL; INTRAMUSCULAR; INTRAVENOUS; SOFT TISSUE at 12:14

## 2021-12-15 RX ADMIN — PANTOPRAZOLE SODIUM 20 MG: 20 TABLET, DELAYED RELEASE ORAL at 06:24

## 2021-12-15 RX ADMIN — Medication 1 TABLET: at 06:24

## 2021-12-16 LAB
ALBUMIN SERPL BCP-MCNC: 2 G/DL (ref 3.5–5)
ALP SERPL-CCNC: 85 U/L (ref 46–116)
ALT SERPL W P-5'-P-CCNC: 30 U/L (ref 12–78)
ANION GAP SERPL CALCULATED.3IONS-SCNC: 10 MMOL/L (ref 4–13)
AST SERPL W P-5'-P-CCNC: 25 U/L (ref 5–45)
BASOPHILS # BLD AUTO: 0.02 THOUSANDS/ΜL (ref 0–0.1)
BASOPHILS NFR BLD AUTO: 0 % (ref 0–1)
BILIRUB SERPL-MCNC: 0.27 MG/DL (ref 0.2–1)
BUN SERPL-MCNC: 11 MG/DL (ref 5–25)
CALCIUM ALBUM COR SERPL-MCNC: 9.6 MG/DL (ref 8.3–10.1)
CALCIUM SERPL-MCNC: 8 MG/DL (ref 8.3–10.1)
CHLORIDE SERPL-SCNC: 106 MMOL/L (ref 100–108)
CO2 SERPL-SCNC: 23 MMOL/L (ref 21–32)
CREAT SERPL-MCNC: 0.45 MG/DL (ref 0.6–1.3)
CRP SERPL QL: 118.8 MG/L
D DIMER PPP FEU-MCNC: 0.48 UG/ML FEU
EOSINOPHIL # BLD AUTO: 0.01 THOUSAND/ΜL (ref 0–0.61)
EOSINOPHIL NFR BLD AUTO: 0 % (ref 0–6)
ERYTHROCYTE [DISTWIDTH] IN BLOOD BY AUTOMATED COUNT: 12.8 % (ref 11.6–15.1)
GFR SERPL CREATININE-BSD FRML MDRD: 128 ML/MIN/1.73SQ M
GLUCOSE SERPL-MCNC: 153 MG/DL (ref 65–140)
GLUCOSE SERPL-MCNC: 201 MG/DL (ref 65–140)
GLUCOSE SERPL-MCNC: 332 MG/DL (ref 65–140)
GLUCOSE SERPL-MCNC: 85 MG/DL (ref 65–140)
GLUCOSE SERPL-MCNC: 87 MG/DL (ref 65–140)
HCT VFR BLD AUTO: 31.1 % (ref 34.8–46.1)
HGB BLD-MCNC: 10.4 G/DL (ref 11.5–15.4)
IMM GRANULOCYTES # BLD AUTO: 0.13 THOUSAND/UL (ref 0–0.2)
IMM GRANULOCYTES NFR BLD AUTO: 1 % (ref 0–2)
LYMPHOCYTES # BLD AUTO: 1.85 THOUSANDS/ΜL (ref 0.6–4.47)
LYMPHOCYTES NFR BLD AUTO: 17 % (ref 14–44)
MCH RBC QN AUTO: 31.7 PG (ref 26.8–34.3)
MCHC RBC AUTO-ENTMCNC: 33.4 G/DL (ref 31.4–37.4)
MCV RBC AUTO: 95 FL (ref 82–98)
MONOCYTES # BLD AUTO: 0.89 THOUSAND/ΜL (ref 0.17–1.22)
MONOCYTES NFR BLD AUTO: 8 % (ref 4–12)
NEUTROPHILS # BLD AUTO: 7.98 THOUSANDS/ΜL (ref 1.85–7.62)
NEUTS SEG NFR BLD AUTO: 74 % (ref 43–75)
NRBC BLD AUTO-RTO: 0 /100 WBCS
PLATELET # BLD AUTO: 223 THOUSANDS/UL (ref 149–390)
PMV BLD AUTO: 10.4 FL (ref 8.9–12.7)
POTASSIUM SERPL-SCNC: 3.5 MMOL/L (ref 3.5–5.3)
PROT SERPL-MCNC: 5.8 G/DL (ref 6.4–8.2)
RBC # BLD AUTO: 3.28 MILLION/UL (ref 3.81–5.12)
SODIUM SERPL-SCNC: 139 MMOL/L (ref 136–145)
WBC # BLD AUTO: 10.88 THOUSAND/UL (ref 4.31–10.16)

## 2021-12-16 PROCEDURE — 86140 C-REACTIVE PROTEIN: CPT

## 2021-12-16 PROCEDURE — 99232 SBSQ HOSP IP/OBS MODERATE 35: CPT | Performed by: HOSPITALIST

## 2021-12-16 PROCEDURE — 85025 COMPLETE CBC W/AUTO DIFF WBC: CPT

## 2021-12-16 PROCEDURE — 85379 FIBRIN DEGRADATION QUANT: CPT

## 2021-12-16 PROCEDURE — 99233 SBSQ HOSP IP/OBS HIGH 50: CPT | Performed by: INTERNAL MEDICINE

## 2021-12-16 PROCEDURE — 82948 REAGENT STRIP/BLOOD GLUCOSE: CPT

## 2021-12-16 PROCEDURE — 80053 COMPREHEN METABOLIC PANEL: CPT

## 2021-12-16 PROCEDURE — 99232 SBSQ HOSP IP/OBS MODERATE 35: CPT | Performed by: OBSTETRICS & GYNECOLOGY

## 2021-12-16 PROCEDURE — NC001 PR NO CHARGE: Performed by: OBSTETRICS & GYNECOLOGY

## 2021-12-16 RX ADMIN — POLYETHYLENE GLYCOL 3350 17 G: 17 POWDER, FOR SOLUTION ORAL at 09:01

## 2021-12-16 RX ADMIN — ENOXAPARIN SODIUM 30 MG: 30 INJECTION SUBCUTANEOUS at 23:12

## 2021-12-16 RX ADMIN — INSULIN GLARGINE 20 UNITS: 100 INJECTION, SOLUTION SUBCUTANEOUS at 21:37

## 2021-12-16 RX ADMIN — GUAIFENESIN 600 MG: 600 TABLET, EXTENDED RELEASE ORAL at 09:05

## 2021-12-16 RX ADMIN — LEVOTHYROXINE SODIUM 50 MCG: 50 TABLET ORAL at 05:39

## 2021-12-16 RX ADMIN — PANTOPRAZOLE SODIUM 20 MG: 20 TABLET, DELAYED RELEASE ORAL at 05:39

## 2021-12-16 RX ADMIN — ENOXAPARIN SODIUM 30 MG: 30 INJECTION SUBCUTANEOUS at 13:05

## 2021-12-16 RX ADMIN — REMDESIVIR 100 MG: 100 INJECTION, POWDER, LYOPHILIZED, FOR SOLUTION INTRAVENOUS at 21:39

## 2021-12-16 RX ADMIN — Medication 1 TABLET: at 05:39

## 2021-12-16 RX ADMIN — GUAIFENESIN 600 MG: 600 TABLET, EXTENDED RELEASE ORAL at 21:37

## 2021-12-16 RX ADMIN — DEXAMETHASONE SODIUM PHOSPHATE 6 MG: 4 INJECTION INTRA-ARTICULAR; INTRALESIONAL; INTRAMUSCULAR; INTRAVENOUS; SOFT TISSUE at 13:05

## 2021-12-17 LAB
ANION GAP SERPL CALCULATED.3IONS-SCNC: 12 MMOL/L (ref 4–13)
BUN SERPL-MCNC: 12 MG/DL (ref 5–25)
CALCIUM SERPL-MCNC: 8.2 MG/DL (ref 8.3–10.1)
CHLORIDE SERPL-SCNC: 104 MMOL/L (ref 100–108)
CO2 SERPL-SCNC: 21 MMOL/L (ref 21–32)
CREAT SERPL-MCNC: 0.48 MG/DL (ref 0.6–1.3)
D DIMER PPP FEU-MCNC: 0.56 UG/ML FEU
ERYTHROCYTE [DISTWIDTH] IN BLOOD BY AUTOMATED COUNT: 12.9 % (ref 11.6–15.1)
GFR SERPL CREATININE-BSD FRML MDRD: 125 ML/MIN/1.73SQ M
GLUCOSE SERPL-MCNC: 137 MG/DL (ref 65–140)
GLUCOSE SERPL-MCNC: 143 MG/DL (ref 65–140)
GLUCOSE SERPL-MCNC: 79 MG/DL (ref 65–140)
GLUCOSE SERPL-MCNC: 80 MG/DL (ref 65–140)
HCT VFR BLD AUTO: 32.2 % (ref 34.8–46.1)
HGB BLD-MCNC: 10.5 G/DL (ref 11.5–15.4)
MCH RBC QN AUTO: 31.3 PG (ref 26.8–34.3)
MCHC RBC AUTO-ENTMCNC: 32.6 G/DL (ref 31.4–37.4)
MCV RBC AUTO: 96 FL (ref 82–98)
PLATELET # BLD AUTO: 255 THOUSANDS/UL (ref 149–390)
PMV BLD AUTO: 10.2 FL (ref 8.9–12.7)
POTASSIUM SERPL-SCNC: 3.5 MMOL/L (ref 3.5–5.3)
RBC # BLD AUTO: 3.36 MILLION/UL (ref 3.81–5.12)
SODIUM SERPL-SCNC: 137 MMOL/L (ref 136–145)
WBC # BLD AUTO: 9.62 THOUSAND/UL (ref 4.31–10.16)

## 2021-12-17 PROCEDURE — 99231 SBSQ HOSP IP/OBS SF/LOW 25: CPT | Performed by: HOSPITALIST

## 2021-12-17 PROCEDURE — 99233 SBSQ HOSP IP/OBS HIGH 50: CPT | Performed by: INTERNAL MEDICINE

## 2021-12-17 PROCEDURE — 85027 COMPLETE CBC AUTOMATED: CPT

## 2021-12-17 PROCEDURE — 85379 FIBRIN DEGRADATION QUANT: CPT

## 2021-12-17 PROCEDURE — 99232 SBSQ HOSP IP/OBS MODERATE 35: CPT | Performed by: OBSTETRICS & GYNECOLOGY

## 2021-12-17 PROCEDURE — NC001 PR NO CHARGE: Performed by: OBSTETRICS & GYNECOLOGY

## 2021-12-17 PROCEDURE — 80048 BASIC METABOLIC PNL TOTAL CA: CPT

## 2021-12-17 PROCEDURE — 82948 REAGENT STRIP/BLOOD GLUCOSE: CPT

## 2021-12-17 RX ADMIN — PANTOPRAZOLE SODIUM 20 MG: 20 TABLET, DELAYED RELEASE ORAL at 05:27

## 2021-12-17 RX ADMIN — INSULIN GLARGINE 20 UNITS: 100 INJECTION, SOLUTION SUBCUTANEOUS at 21:53

## 2021-12-17 RX ADMIN — LEVOTHYROXINE SODIUM 50 MCG: 50 TABLET ORAL at 05:27

## 2021-12-17 RX ADMIN — GUAIFENESIN 600 MG: 600 TABLET, EXTENDED RELEASE ORAL at 21:49

## 2021-12-17 RX ADMIN — BENZONATATE 100 MG: 100 CAPSULE ORAL at 09:49

## 2021-12-17 RX ADMIN — Medication 1 TABLET: at 05:28

## 2021-12-17 RX ADMIN — GUAIFENESIN 600 MG: 600 TABLET, EXTENDED RELEASE ORAL at 09:49

## 2021-12-17 RX ADMIN — DEXAMETHASONE SODIUM PHOSPHATE 6 MG: 4 INJECTION INTRA-ARTICULAR; INTRALESIONAL; INTRAMUSCULAR; INTRAVENOUS; SOFT TISSUE at 12:18

## 2021-12-17 RX ADMIN — ACETAMINOPHEN 650 MG: 325 TABLET, FILM COATED ORAL at 09:49

## 2021-12-17 RX ADMIN — ENOXAPARIN SODIUM 30 MG: 30 INJECTION SUBCUTANEOUS at 12:18

## 2021-12-18 LAB
D DIMER PPP FEU-MCNC: 0.44 UG/ML FEU
GLUCOSE SERPL-MCNC: 110 MG/DL (ref 65–140)
GLUCOSE SERPL-MCNC: 123 MG/DL (ref 65–140)
GLUCOSE SERPL-MCNC: 186 MG/DL (ref 65–140)
GLUCOSE SERPL-MCNC: 73 MG/DL (ref 65–140)

## 2021-12-18 PROCEDURE — 85379 FIBRIN DEGRADATION QUANT: CPT

## 2021-12-18 PROCEDURE — 82948 REAGENT STRIP/BLOOD GLUCOSE: CPT

## 2021-12-18 PROCEDURE — NC001 PR NO CHARGE: Performed by: OBSTETRICS & GYNECOLOGY

## 2021-12-18 PROCEDURE — 99232 SBSQ HOSP IP/OBS MODERATE 35: CPT | Performed by: OBSTETRICS & GYNECOLOGY

## 2021-12-18 RX ADMIN — ENOXAPARIN SODIUM 30 MG: 30 INJECTION SUBCUTANEOUS at 13:05

## 2021-12-18 RX ADMIN — GUAIFENESIN 600 MG: 600 TABLET, EXTENDED RELEASE ORAL at 21:24

## 2021-12-18 RX ADMIN — GUAIFENESIN 600 MG: 600 TABLET, EXTENDED RELEASE ORAL at 09:03

## 2021-12-18 RX ADMIN — Medication 1 TABLET: at 05:59

## 2021-12-18 RX ADMIN — DEXAMETHASONE SODIUM PHOSPHATE 6 MG: 4 INJECTION INTRA-ARTICULAR; INTRALESIONAL; INTRAMUSCULAR; INTRAVENOUS; SOFT TISSUE at 13:05

## 2021-12-18 RX ADMIN — PANTOPRAZOLE SODIUM 20 MG: 20 TABLET, DELAYED RELEASE ORAL at 05:59

## 2021-12-18 RX ADMIN — INSULIN GLARGINE 20 UNITS: 100 INJECTION, SOLUTION SUBCUTANEOUS at 21:25

## 2021-12-18 RX ADMIN — ENOXAPARIN SODIUM 30 MG: 30 INJECTION SUBCUTANEOUS at 00:27

## 2021-12-18 RX ADMIN — LEVOTHYROXINE SODIUM 50 MCG: 50 TABLET ORAL at 05:59

## 2021-12-18 RX ADMIN — ENOXAPARIN SODIUM 30 MG: 30 INJECTION SUBCUTANEOUS at 23:09

## 2021-12-19 VITALS
OXYGEN SATURATION: 94 % | TEMPERATURE: 98.2 F | HEART RATE: 65 BPM | SYSTOLIC BLOOD PRESSURE: 107 MMHG | DIASTOLIC BLOOD PRESSURE: 61 MMHG | BODY MASS INDEX: 32.82 KG/M2 | WEIGHT: 197 LBS | HEIGHT: 65 IN | RESPIRATION RATE: 18 BRPM

## 2021-12-19 PROBLEM — Z3A.24 24 WEEKS GESTATION OF PREGNANCY: Status: ACTIVE | Noted: 2021-12-12

## 2021-12-19 LAB
D DIMER PPP FEU-MCNC: 0.57 UG/ML FEU
GLUCOSE SERPL-MCNC: 116 MG/DL (ref 65–140)
GLUCOSE SERPL-MCNC: 78 MG/DL (ref 65–140)

## 2021-12-19 PROCEDURE — 99238 HOSP IP/OBS DSCHRG MGMT 30/<: CPT | Performed by: OBSTETRICS & GYNECOLOGY

## 2021-12-19 PROCEDURE — 59025 FETAL NON-STRESS TEST: CPT | Performed by: OBSTETRICS & GYNECOLOGY

## 2021-12-19 PROCEDURE — 85379 FIBRIN DEGRADATION QUANT: CPT

## 2021-12-19 PROCEDURE — 99233 SBSQ HOSP IP/OBS HIGH 50: CPT | Performed by: INTERNAL MEDICINE

## 2021-12-19 PROCEDURE — NC001 PR NO CHARGE: Performed by: OBSTETRICS & GYNECOLOGY

## 2021-12-19 PROCEDURE — 82948 REAGENT STRIP/BLOOD GLUCOSE: CPT

## 2021-12-19 RX ORDER — DEXAMETHASONE 6 MG/1
6 TABLET ORAL
Qty: 3 TABLET | Refills: 0 | Status: SHIPPED | OUTPATIENT
Start: 2021-12-19 | End: 2021-12-22

## 2021-12-19 RX ORDER — BENZONATATE 100 MG/1
100 CAPSULE ORAL 3 TIMES DAILY PRN
Qty: 20 CAPSULE | Refills: 0 | Status: SHIPPED | OUTPATIENT
Start: 2021-12-19 | End: 2022-02-18

## 2021-12-19 RX ADMIN — BENZONATATE 100 MG: 100 CAPSULE ORAL at 10:33

## 2021-12-19 RX ADMIN — PANTOPRAZOLE SODIUM 20 MG: 20 TABLET, DELAYED RELEASE ORAL at 04:47

## 2021-12-19 RX ADMIN — GUAIFENESIN 600 MG: 600 TABLET, EXTENDED RELEASE ORAL at 09:27

## 2021-12-19 RX ADMIN — ENOXAPARIN SODIUM 30 MG: 30 INJECTION SUBCUTANEOUS at 13:32

## 2021-12-19 RX ADMIN — Medication 1 TABLET: at 04:47

## 2021-12-19 RX ADMIN — DEXAMETHASONE SODIUM PHOSPHATE 6 MG: 4 INJECTION INTRA-ARTICULAR; INTRALESIONAL; INTRAMUSCULAR; INTRAVENOUS; SOFT TISSUE at 13:32

## 2021-12-19 RX ADMIN — LEVOTHYROXINE SODIUM 50 MCG: 50 TABLET ORAL at 04:47

## 2021-12-23 ENCOUNTER — ROUTINE PRENATAL (OUTPATIENT)
Dept: OBGYN CLINIC | Facility: CLINIC | Age: 36
End: 2021-12-23

## 2021-12-23 VITALS
HEIGHT: 65 IN | WEIGHT: 193 LBS | DIASTOLIC BLOOD PRESSURE: 70 MMHG | SYSTOLIC BLOOD PRESSURE: 130 MMHG | BODY MASS INDEX: 32.15 KG/M2

## 2021-12-23 DIAGNOSIS — E88.81 INSULIN RESISTANCE: ICD-10-CM

## 2021-12-23 DIAGNOSIS — Q23.1 BICUSPID AORTIC VALVE: ICD-10-CM

## 2021-12-23 DIAGNOSIS — Z86.16 PERSONAL HISTORY OF COVID-19: ICD-10-CM

## 2021-12-23 DIAGNOSIS — Z3A.24 24 WEEKS GESTATION OF PREGNANCY: ICD-10-CM

## 2021-12-23 DIAGNOSIS — Z36.89 ENCOUNTER FOR OTHER SPECIFIED ANTENATAL SCREENING: Primary | ICD-10-CM

## 2021-12-23 PROCEDURE — PNV: Performed by: OBSTETRICS & GYNECOLOGY

## 2021-12-27 ENCOUNTER — HOSPITAL ENCOUNTER (OUTPATIENT)
Facility: HOSPITAL | Age: 36
Discharge: HOME | End: 2021-12-27
Attending: OBSTETRICS & GYNECOLOGY | Admitting: OBSTETRICS & GYNECOLOGY
Payer: COMMERCIAL

## 2021-12-27 VITALS
SYSTOLIC BLOOD PRESSURE: 122 MMHG | HEART RATE: 91 BPM | TEMPERATURE: 99.1 F | RESPIRATION RATE: 16 BRPM | DIASTOLIC BLOOD PRESSURE: 74 MMHG

## 2021-12-27 PROBLEM — Z3A.25 25 WEEKS GESTATION OF PREGNANCY: Status: ACTIVE | Noted: 2021-12-27

## 2021-12-27 PROBLEM — U07.1 COVID-19 AFFECTING PREGNANCY IN SECOND TRIMESTER: Status: RESOLVED | Noted: 2021-12-12 | Resolved: 2021-12-27

## 2021-12-27 PROBLEM — O98.512 COVID-19 AFFECTING PREGNANCY IN SECOND TRIMESTER: Status: RESOLVED | Noted: 2021-12-12 | Resolved: 2021-12-27

## 2021-12-27 PROCEDURE — NC001 PR NO CHARGE: Performed by: OBSTETRICS & GYNECOLOGY

## 2021-12-27 PROCEDURE — 99203 OFFICE O/P NEW LOW 30 MIN: CPT

## 2022-01-05 ENCOUNTER — ROUTINE PRENATAL (OUTPATIENT)
Dept: PERINATAL CARE | Facility: OTHER | Age: 37
End: 2022-01-05
Payer: COMMERCIAL

## 2022-01-05 VITALS
BODY MASS INDEX: 33.39 KG/M2 | HEART RATE: 101 BPM | WEIGHT: 200.4 LBS | HEIGHT: 65 IN | DIASTOLIC BLOOD PRESSURE: 74 MMHG | SYSTOLIC BLOOD PRESSURE: 119 MMHG

## 2022-01-05 DIAGNOSIS — Z3A.26 26 WEEKS GESTATION OF PREGNANCY: ICD-10-CM

## 2022-01-05 DIAGNOSIS — O09.522 MULTIGRAVIDA OF ADVANCED MATERNAL AGE IN SECOND TRIMESTER: Primary | ICD-10-CM

## 2022-01-05 PROCEDURE — 99213 OFFICE O/P EST LOW 20 MIN: CPT | Performed by: OBSTETRICS & GYNECOLOGY

## 2022-01-05 PROCEDURE — 76811 OB US DETAILED SNGL FETUS: CPT | Performed by: OBSTETRICS & GYNECOLOGY

## 2022-01-12 NOTE — PROGRESS NOTES
The patient was seen today for an ultrasound  Please see ultrasound report (located under Ob Procedures) for additional details  Thank you very much for allowing us to participate in the care of this very nice patient  Should you have any questions, please do not hesitate to contact me  Bean Huang MD [place]  Attending Physician, [place]

## 2022-01-17 PROBLEM — Z86.16 PERSONAL HISTORY OF COVID-19: Status: ACTIVE | Noted: 2022-01-17

## 2022-01-17 NOTE — PROGRESS NOTES
Routine Prenatal Visit  [address] [facility], [address], [facility], [address] [facility]    Assessment/Plan:  Mary Grace Mitchell is a 39y o  year old  at 28w0d who presents for routine prenatal visit  Pt transferred her prenatal care from [place]  Was treated for COVID-19 at [facility] in 2021 and has been following up with [facility] [address]'s MFM    1  Encounter for other specified  screening  -     Glucose, 1H PG; Future  -     CBC; Future  -     RPR; Future  -     Glucose, 1H PG  -     CBC  -     RPR    2  24 weeks gestation of pregnancy  -     POCT urine dip    3  Bicuspid aortic valve      -  U/S scheduled with MFM  Will discuss scheduling fetal echo at next visit with MFM     4  Insulin resistance     -  H/O PCOS  5   Personal history of COVID-19     -  Managed at [facility] in 2021    Next OB Visit 4 weeks  Subjective:     CC: Prenatal care    Matthew Jordan is a 39 y o   female who presents for routine prenatal care at 28w0d  Pregnancy ROS: no leakage of fluid, pelvic pain, or vaginal bleeding  Nml fetal movement  The following portions of the patient's history were reviewed and updated as appropriate: allergies, current medications, past family history, past medical history, obstetric history, gynecologic history, past social history, past surgical history and problem list       Objective:  /70 (BP Location: Right arm, Patient Position: Sitting, Cuff Size: Standard)   Ht 5' 5" (1 651 m)   Wt 87 5 kg (193 lb)   LMP 2021 (Exact Date)   BMI 32 12 kg/m²   Pregravid Weight/BMI: Pregravid weight not on file (BMI Could not be calculated)  Current Weight: 87 5 kg (193 lb)   Total Weight Gain: Not found     Pre-Akira Vitals      Most Recent Value   Prenatal Assessment    Fetal Heart Rate 120-150   Movement Present   Prenatal Vitals    Blood Pressure 130/70   Weight - Scale 87 5 kg (193 lb)   Urine Albumin/Glucose Dilation/Effacement/Station    Vaginal Drainage    Draining Fluid No   Edema            General: Well appearing, no distress  Abdomen: Soft, gravid, nontender  Fundal Height: Appropriate for gestational age  Extremities: Warm and well perfused  Non tender

## 2022-01-19 NOTE — PROGRESS NOTES
Please refer to the [facility] ultrasound report in Ob Procedures for additional information regarding today's visit

## 2022-01-20 ENCOUNTER — ROUTINE PRENATAL (OUTPATIENT)
Dept: PERINATAL CARE | Facility: OTHER | Age: 37
End: 2022-01-20
Payer: COMMERCIAL

## 2022-01-20 VITALS
DIASTOLIC BLOOD PRESSURE: 76 MMHG | HEART RATE: 103 BPM | HEIGHT: 65 IN | BODY MASS INDEX: 33.89 KG/M2 | SYSTOLIC BLOOD PRESSURE: 120 MMHG | WEIGHT: 203.4 LBS

## 2022-01-20 DIAGNOSIS — O98.512 COVID-19 AFFECTING PREGNANCY IN SECOND TRIMESTER: ICD-10-CM

## 2022-01-20 DIAGNOSIS — Z3A.28 28 WEEKS GESTATION OF PREGNANCY: Primary | ICD-10-CM

## 2022-01-20 DIAGNOSIS — U07.1 COVID-19 AFFECTING PREGNANCY IN SECOND TRIMESTER: ICD-10-CM

## 2022-01-20 DIAGNOSIS — O35.2XX0 HEREDITARY DISEASE IN FAMILY POSSIBLY AFFECTING FETUS, AFFECTING MANAGEMENT OF MOTHER IN PREGNANCY, SINGLE OR UNSPECIFIED FETUS: ICD-10-CM

## 2022-01-20 PROCEDURE — 93325 DOPPLER ECHO COLOR FLOW MAPG: CPT | Performed by: OBSTETRICS & GYNECOLOGY

## 2022-01-20 PROCEDURE — 76825 ECHO EXAM OF FETAL HEART: CPT | Performed by: OBSTETRICS & GYNECOLOGY

## 2022-01-20 PROCEDURE — 76816 OB US FOLLOW-UP PER FETUS: CPT | Performed by: OBSTETRICS & GYNECOLOGY

## 2022-01-20 PROCEDURE — 76827 ECHO EXAM OF FETAL HEART: CPT | Performed by: OBSTETRICS & GYNECOLOGY

## 2022-01-20 RX ORDER — ASPIRIN 81 MG/1
162 TABLET ORAL DAILY
COMMUNITY
End: 2022-03-15

## 2022-01-20 RX ORDER — ECHINACEA PURPUREA EXTRACT 125 MG
1 TABLET ORAL AS NEEDED
COMMUNITY
End: 2022-02-18

## 2022-01-20 NOTE — PATIENT INSTRUCTIONS
Kick Counts in Pregnancy   WHAT YOU NEED TO KNOW:   Kick counts measure how much your baby is moving in your womb  A kick from your baby can be felt as a twist, turn, swish, roll, or jab  It is common to feel your baby kicking at 26 to 28 weeks of pregnancy  You may feel your baby kick as early as 20 weeks of pregnancy  You may want to start counting at 28 weeks  DISCHARGE INSTRUCTIONS:   Contact your doctor immediately if:   · You feel a change in the number of kicks or movements of your baby  · You feel fewer than 10 kicks within 2 hours  · You have questions or concerns about your baby's movements  Why measure kick counts:  Your baby's movement may provide information about your baby's health  He or she may move less, or not at all, if there are problems  Your baby may move less if he or she is not getting enough oxygen or nutrition from the placenta  Do not smoke while you are pregnant  Smoking decreases the amount of oxygen that gets to your baby  Talk to your healthcare provider if you need help to quit smoking  Tell your healthcare provider as soon as you feel a change in your baby's movements  When to measure kick counts:   · Measure kick counts at the same time every day  · Measure kick counts when your baby is awake and most active  Your baby may be most active in the evening  How to measure kick counts:  Check that your baby is awake before you measure kick counts  You can wake up your baby by lightly pushing on your belly, walking, or drinking something cold  Your healthcare provider may tell you different ways to measure kick counts  You may be told to do the following:  · Use a chart or clock to keep track of the time you start and finish counting  · Sit in a chair or lie on your left side  · Place your hands on the largest part of your belly  · Count until you reach 10 kicks  Write down how much time it takes to count 10 kicks       · It may take 30 minutes to 2 hours to count 10 kicks  It should not take more than 2 hours to count 10 kicks  Follow up with your doctor as directed:  Write down your questions so you remember to ask them during your visits  © Copyright [employer] 2021 Information is for End User's use only and may not be sold, redistributed or otherwise used for commercial purposes  All illustrations and images included in CareNotes® are the copyrighted property of A D A M , Inc  or [facility] Karen [employer]   The above information is an  only  It is not intended as medical advice for individual conditions or treatments  Talk to your doctor, nurse or pharmacist before following any medical regimen to see if it is safe and effective for you

## 2022-01-20 NOTE — LETTER
January 20, 2022     DO Derrick Rodriguez 81  [address] [facility] [address]  [facility]    Patient: Christin Thomson   YOB: 1985   Date of Visit: 1/20/2022       Dear Dr Lakshmi Schafer: Thank you for referring Flavio Howard to me for evaluation  Below are my notes for this consultation  If you have questions, please do not hesitate to call me  I look forward to following your patient along with you  Sincerely,        Amauri Reyes MD        CC: No Recipients  Amauri Reyes MD  1/19/2022  5:27 PM  Sign when Signing Visit  Please refer to the [facility] ultrasound report in Ob Procedures for additional information regarding today's visit

## 2022-01-21 ENCOUNTER — ROUTINE PRENATAL (OUTPATIENT)
Dept: OBGYN CLINIC | Facility: CLINIC | Age: 37
End: 2022-01-21

## 2022-01-21 VITALS — DIASTOLIC BLOOD PRESSURE: 62 MMHG | SYSTOLIC BLOOD PRESSURE: 110 MMHG | WEIGHT: 200.6 LBS | BODY MASS INDEX: 33.38 KG/M2

## 2022-01-21 DIAGNOSIS — O98.512 COVID-19 AFFECTING PREGNANCY IN SECOND TRIMESTER: ICD-10-CM

## 2022-01-21 DIAGNOSIS — Z86.16 PERSONAL HISTORY OF COVID-19: ICD-10-CM

## 2022-01-21 DIAGNOSIS — Z3A.28 28 WEEKS GESTATION OF PREGNANCY: Primary | ICD-10-CM

## 2022-01-21 DIAGNOSIS — Q23.1 BICUSPID AORTIC VALVE: ICD-10-CM

## 2022-01-21 DIAGNOSIS — E88.81 INSULIN RESISTANCE: ICD-10-CM

## 2022-01-21 DIAGNOSIS — G54.0 THORACIC OUTLET SYNDROME: ICD-10-CM

## 2022-01-21 DIAGNOSIS — O35.2XX0 HEREDITARY DISEASE IN FAMILY POSSIBLY AFFECTING FETUS, AFFECTING MANAGEMENT OF MOTHER IN PREGNANCY, SINGLE OR UNSPECIFIED FETUS: ICD-10-CM

## 2022-01-21 DIAGNOSIS — U07.1 COVID-19 AFFECTING PREGNANCY IN SECOND TRIMESTER: ICD-10-CM

## 2022-01-21 LAB
SL AMB  POCT GLUCOSE, UA: NEGATIVE
SL AMB  POCT GLUCOSE, UA: NORMAL
SL AMB POCT URINE PROTEIN: NEGATIVE
SL AMB POCT URINE PROTEIN: NORMAL

## 2022-01-21 PROCEDURE — PNV: Performed by: OBSTETRICS & GYNECOLOGY

## 2022-01-21 RX ORDER — GUAIFENESIN 600 MG
600 TABLET, EXTENDED RELEASE 12 HR ORAL DAILY
COMMUNITY
End: 2022-03-11

## 2022-01-21 RX ORDER — EPINEPHRINE 0.3 MG/.3ML
0.3 INJECTION SUBCUTANEOUS EVERY 8 HOURS PRN
COMMUNITY

## 2022-01-21 NOTE — PATIENT INSTRUCTIONS
NUTRITION IN PREGNANCY  Good Nutrition is a VERY important part of having a healthy pregnancy and healthy baby  You should follow a healthy diet which include the following: * Vegetables (which are dark green and leafy): at least 2 servings each day   * Protein (meat, eggs, beans, nuts, peanut butter): 3-4 servings each day   * Breads/whole grains (bread, pasta, rice, tortillas, potatoes): 3 servings each day   * Dairy (milk, yogurt, cheese): 3-4 servings each day   * Water: 6-8 glasses per day   * Calories: approximately 2000 to 2200 calories per day     WEIGHT GAIN   Recommended weight gain for you during your pregnancy is based on your body mass index (BMI) at the time that you became pregnant  Pre-pregnant BMI Recommended weight gain   Underweight (BMI less than 18 5) 28 to 40 pounds   Normal weight (BMI 18 5-24 9) 25 to 35 pounds   Overweight (BMI 25-29 9) 15 to 25 pounds   Obese (BMI 30 or greater) 11 to 20 pounds     FOOD SAFETY   It is VERY important to eat only safely-prepared foods during pregnancy as you and your baby have a higher risk than usual for being affected by foodborne illnesses    Follow these steps to ensure that you and your baby are safe from foodborne illnesses while you are pregnant:   · wash hands thoroughly with warm water and soap before and after handling any foods   · wash cutting boards, dishes, utensils, and countertops with hot water and soap before and after preparing any foods   · rinse raw fruits and vegetables thoroughly under running water before eating   · keep raw meat and seafood separate from other foods and use different cutting boards/utensils to handle raw meat than for other foods   · put cooked food on a freshly clean plate   · cook all of your foods thoroughly   · discard foods that have been left out for more than 2 hours   · refrigerate or freeze any foods than can spoil     There are three particular foodborne risks that you should be aware of and avoid as they can cause serious harm to your unborn child  * Listeria (a harmful bacteria)   · dont eat hot dogs or deli meats (unless theyre reheated until steaming hot)   · dont eat soft cheeses (such as Feta, Brie, Camembert) unless they are specifically labeled as being made with pasteurized milk   · dont drink raw (unpasteurized) milk   · dont eat refrigerated pates or meat spreads   · dont eat refrigerated smoked seafood unless its in a cooked dish like a casserole     * Mercury (a metal which is found in certain fish in high levels)   · dont eat shark, tilefish, janine mackerel, or swordfish   · dont eat more than 12 ounces per week of shrimp, salmon, pollock, or catfish   · when eating tuna fish, you can have up to 6 ounces per week of canned albacore tuna OR up to 12 ounces of canned light tuna     * Toxoplasma (a harmful parasite)   · cook meat thoroughly before eating   · wear gloves when gardening or handling sand from a childs sandbox   · if you ha tve a cat, have someone else change the litter box while you are pregnant  · if you HAVE to clean it yourself, be sure to wash your hands thoroughly afterwards with warm water and soap     · dont get a NEW cat while you are pregnant

## 2022-01-21 NOTE — PROGRESS NOTES
Routine Prenatal Visit  [address] [facility], [address], [facility], [address]    Assessment/Plan:  Leonila Jiménez is a 39y o  year old  at 33w3d who presents for routine prenatal visit  1  28 weeks gestation of pregnancy  -     POCT urine dip    2  COVID-19 affecting pregnancy in second trimester    3  Insulin resistance  Comments:  2 hr  in   first  trimester  140   Order given for  one hour      4  Hereditary disease in family possibly affecting fetus, affecting management of mother in pregnancy, single or unspecified fetus  Comments:  neg fetal  echo MFM       5  Bicuspid aortic valve  Comments:  maternal echo     + fm  No UTCX   + some fatigue     Fetal  Echo  Reviewed    Needs  Fu  growth scan at  35 weeks  Reviewed  28 week labs   Pt to schedule  Fu in  2 weeks DW pt   tdap next visit   Subjective:     CC: Prenatal care    Ward Matamoros is a 39 y o   female who presents for routine prenatal care at 28w4d  Pregnancy ROS: no  leakage of fluid, pelvic pain, or vaginal bleeding   + fetal movement  The following portions of the patient's history were reviewed and updated as appropriate: allergies, current medications, past family history, past medical history, obstetric history, gynecologic history, past social history, past surgical history and problem list       Objective:  /62   Wt 91 kg (200 lb 9 6 oz)   LMP 2021 (Exact Date)   BMI 33 38 kg/m²   Pregravid Weight/BMI: Pregravid weight not on file (BMI Could not be calculated)  Current Weight: 91 kg (200 lb 9 6 oz)   Total Weight Gain: Not found     Pre-Akira Vitals      Most Recent Value   Prenatal Assessment    Fetal Heart Rate 136-148   Fundal Height (cm) 29 cm   Movement Present   Prenatal Vitals    Blood Pressure 110/62   Weight - Scale 91 kg (200 lb 9 6 oz)   Urine Albumin/Glucose    Dilation/Effacement/Station    Vaginal Drainage    Draining Fluid No   Edema LLE Edema None   RLE Edema None   Facial Edema None           General: Well appearing, no distress  Respiratory: Unlabored breathing  Cardiovascular: Regular rate  Abdomen: Soft, gravid, nontender  Fundal Height: Appropriate for gestational age  Extremities: Warm and well perfused  Non tender

## 2022-01-27 PROBLEM — Z87.59 HISTORY OF GESTATIONAL HYPERTENSION: Status: ACTIVE | Noted: 2022-01-27

## 2022-01-27 PROBLEM — E03.8 SUBCLINICAL HYPOTHYROIDISM: Status: ACTIVE | Noted: 2022-01-27

## 2022-01-27 PROBLEM — Z3A.24 24 WEEKS GESTATION OF PREGNANCY: Status: RESOLVED | Noted: 2021-12-12 | Resolved: 2022-01-27

## 2022-01-31 ENCOUNTER — TELEPHONE (OUTPATIENT)
Dept: OBGYN CLINIC | Facility: CLINIC | Age: 37
End: 2022-01-31

## 2022-01-31 NOTE — TELEPHONE ENCOUNTER
Jeremías olivares she has an appt this Friday  Wondering if she will be having a physical exam  Feeling weird vaginal pressure  Return call to jeremías who states she went to the bathroom about an hour ago and felt vaginal pressure like something might be going to come out of vagina  Denies bleeding,spotting, cramping  +FM  Pressure has resolved, no discomfort with standing or walking  Reviewed increased vaginal pressure at 30 weeks-G4, P2 not uncommon-can be related to varicose veins of vagina due to changes in blood flow  Continue to monitor and report any changes  Advised she can request provider exam at next OB appt  Patient verbalized understanding and agreement to plan

## 2022-02-04 ENCOUNTER — ROUTINE PRENATAL (OUTPATIENT)
Dept: OBGYN CLINIC | Facility: CLINIC | Age: 37
End: 2022-02-04
Payer: COMMERCIAL

## 2022-02-04 VITALS
SYSTOLIC BLOOD PRESSURE: 132 MMHG | BODY MASS INDEX: 33.62 KG/M2 | WEIGHT: 201.8 LBS | DIASTOLIC BLOOD PRESSURE: 70 MMHG | HEIGHT: 65 IN

## 2022-02-04 DIAGNOSIS — Z86.718 HISTORY OF DVT (DEEP VEIN THROMBOSIS): ICD-10-CM

## 2022-02-04 DIAGNOSIS — Z87.59 HISTORY OF GESTATIONAL HYPERTENSION: ICD-10-CM

## 2022-02-04 DIAGNOSIS — E03.8 SUBCLINICAL HYPOTHYROIDISM: ICD-10-CM

## 2022-02-04 DIAGNOSIS — Z3A.30 30 WEEKS GESTATION OF PREGNANCY: ICD-10-CM

## 2022-02-04 DIAGNOSIS — O98.519 COVID-19 AFFECTING PREGNANCY, ANTEPARTUM: ICD-10-CM

## 2022-02-04 DIAGNOSIS — F32.9 REACTIVE DEPRESSION: ICD-10-CM

## 2022-02-04 DIAGNOSIS — O09.90 HIGH RISK PREGNANCY, ANTEPARTUM: Primary | ICD-10-CM

## 2022-02-04 DIAGNOSIS — U07.1 COVID-19 AFFECTING PREGNANCY, ANTEPARTUM: ICD-10-CM

## 2022-02-04 DIAGNOSIS — Z23 NEED FOR TDAP VACCINATION: ICD-10-CM

## 2022-02-04 DIAGNOSIS — G54.0 THORACIC OUTLET SYNDROME: ICD-10-CM

## 2022-02-04 DIAGNOSIS — Q23.1 BICUSPID AORTIC VALVE: ICD-10-CM

## 2022-02-04 LAB
SL AMB  POCT GLUCOSE, UA: NEGATIVE
SL AMB POCT URINE PROTEIN: NEGATIVE

## 2022-02-04 PROCEDURE — PNV: Performed by: STUDENT IN AN ORGANIZED HEALTH CARE EDUCATION/TRAINING PROGRAM

## 2022-02-04 PROCEDURE — 90715 TDAP VACCINE 7 YRS/> IM: CPT

## 2022-02-04 PROCEDURE — 90471 IMMUNIZATION ADMIN: CPT

## 2022-02-04 NOTE — ASSESSMENT & PLAN NOTE
- 3rd trimester growth US scheduled with MFM for 2/16   - SVE performed today due to irregular contractions  Cervix 1-2/long/high/posterior/firm   Recommend hydration and frequent rest

## 2022-02-04 NOTE — PROGRESS NOTES
Routine Prenatal Visit  [address] [facility], [address], [facility], [address]    Assessment/Plan:  Santa Hoffman is a 39y o  year old  at 30w4d who presents for routine prenatal visit  1  High risk pregnancy, antepartum  Assessment & Plan:  - 3rd trimester growth US scheduled with MFM for    - SVE performed today due to irregular contractions  Cervix 1-2/long/high/posterior/firm  Recommend hydration and frequent rest        2  30 weeks gestation of pregnancy  -     POCT urine dip    3  Subclinical hypothyroidism  Assessment & Plan:  - Repeat TSH ordered today  Will have drawn with 3rd trimester labs    Orders:  -     TSH, 3rd generation with Free T4 reflex; Future  -     TSH, 3rd generation with Free T4 reflex    4  History of gestational hypertension  Assessment & Plan:  - Normal blood pressure today, no proteinuria  No s/s pre-E  Reviewed warning signs  5  Need for Tdap vaccination  -     Tdap Vaccine greater than or equal to 8yo    6  COVID-19 affecting pregnancy, antepartum    7  History of DVT (deep vein thrombosis)  Assessment & Plan:  - Per MFM recommendations at [facility], Lovenox was deemed unnecessary due to surgical repair of thoracic outlet syndrome and negative thrombophilia workup  8  Bicuspid aortic valve  Assessment & Plan:  - [address] [facility], most recently   Per patient, no further recommendations in pregnancy  Patient will complete release of records form today to obtain records  - Maternal and fetal echos in pregnancy were reassuring  9  Thoracic outlet syndrome    10  Reactive depression      Additional routine prenatal care topics addressed today:  - PTL/PPROM/Bleeding precautions given  Kick counts reviewed  - Third trimester labs not yet drawn  Order requisition reprinted for patient today  Patient has appointment for lab draw  - Recommendation for administration of TDaP was reviewed  TDaP administered today    - Vaccination against COVID-19 and seasonal influenza was recommended  Reviewed increased risk of severe illness among pregnant patients, including risk of hospitalization, ICU admission, intubation,  delivery, and maternal and/or  death  Patient declines COVID vaccine  Will plan for flu vaccine at next visit  - Problem list updated, results console reviewed and updated with pertinent prenatal labs  - PMH, PSH, medications reviewed and updated as needed  - Return to office in 2 wk(s) for routine prenatal care      Subjective:   Eve Wall is a 39 y o  [id number] who presents for routine prenatal care at 30w4d  Complaints today: Irregular cramping, improves with rest   LOF: No; VB: No; Contractions: See above; FM: Present and normal    Objective:  /70 (BP Location: Left arm, Patient Position: Sitting, Cuff Size: Standard)   Ht 5' 5" (1 651 m)   Wt 91 5 kg (201 lb 12 8 oz)   LMP 2021 (Exact Date)   BMI 33 58 kg/m²     General: Well appearing, no distress  Respiratory: Unlabored breathing  Cardiovascular: Regular rate  Abdomen: Soft, gravid, nontender  Extremities: Warm and well perfused  Non tender  Pregravid Weight/BMI: Pregravid weight not on file (BMI Could not be calculated)  Current Weight: 91 5 kg (201 lb 12 8 oz)   Total Weight Gain: Not found       Pre- Vitals      Most Recent Value   Prenatal Assessment    Fetal Heart Rate 145   Fundal Height (cm) 30 cm   Movement Present   Prenatal Vitals    Blood Pressure 132/70   Weight - Scale 91 5 kg (201 lb 12 8 oz)   Urine Albumin/Glucose    Dilation/Effacement/Station    Cervical Dilation 1 5   Cervical Effacement 0   Fetal Station -4   Vaginal Drainage    Draining Fluid No   Edema    LLE Edema None   RLE Edema None   Facial Edema None           Ju He MD  2022 2:20 PM

## 2022-02-04 NOTE — ASSESSMENT & PLAN NOTE
- Per MFM recommendations at [facility], Lovenox was deemed unnecessary due to surgical repair of thoracic outlet syndrome and negative thrombophilia workup

## 2022-02-04 NOTE — ASSESSMENT & PLAN NOTE
- Sees [id number] [facility], most recently 1/19  Per patient, no further recommendations in pregnancy  Patient will complete release of records form today to obtain records  - Maternal and fetal echos in pregnancy were reassuring

## 2022-02-11 PROBLEM — D50.8 IRON DEFICIENCY ANEMIA SECONDARY TO INADEQUATE DIETARY IRON INTAKE: Status: ACTIVE | Noted: 2022-02-11

## 2022-02-11 PROBLEM — R73.09 ELEVATED GLUCOSE TOLERANCE TEST: Status: ACTIVE | Noted: 2022-02-11

## 2022-02-11 LAB
ERYTHROCYTE [DISTWIDTH] IN BLOOD BY AUTOMATED COUNT: 12.5 % (ref 11.7–15.4)
GLUCOSE 1H P 50 G GLC PO SERPL-MCNC: 138 MG/DL (ref 65–139)
HCT VFR BLD AUTO: 32.5 % (ref 34–46.6)
HGB BLD-MCNC: 10.8 G/DL (ref 11.1–15.9)
MCH RBC QN AUTO: 30.4 PG (ref 26.6–33)
MCHC RBC AUTO-ENTMCNC: 33.2 G/DL (ref 31.5–35.7)
MCV RBC AUTO: 92 FL (ref 79–97)
PLATELET # BLD AUTO: 244 X10E3/UL (ref 150–450)
RBC # BLD AUTO: 3.55 X10E6/UL (ref 3.77–5.28)
RPR SER QL: NON REACTIVE
TSH SERPL DL<=0.005 MIU/L-ACNC: 2.61 UIU/ML (ref 0.45–4.5)
WBC # BLD AUTO: 9.3 X10E3/UL (ref 3.4–10.8)

## 2022-02-14 ENCOUNTER — TELEPHONE (OUTPATIENT)
Dept: OBGYN CLINIC | Facility: CLINIC | Age: 37
End: 2022-02-14

## 2022-02-14 DIAGNOSIS — E03.9 HYPOTHYROIDISM DURING PREGNANCY IN SECOND TRIMESTER: Primary | ICD-10-CM

## 2022-02-14 DIAGNOSIS — O99.282 HYPOTHYROIDISM DURING PREGNANCY IN SECOND TRIMESTER: Primary | ICD-10-CM

## 2022-02-14 RX ORDER — LEVOTHYROXINE SODIUM 0.07 MG/1
75 TABLET ORAL DAILY
Qty: 30 TABLET | Refills: 3 | Status: SHIPPED | OUTPATIENT
Start: 2022-02-14

## 2022-02-18 ENCOUNTER — ROUTINE PRENATAL (OUTPATIENT)
Dept: OBGYN CLINIC | Facility: CLINIC | Age: 37
End: 2022-02-18
Payer: COMMERCIAL

## 2022-02-18 VITALS
SYSTOLIC BLOOD PRESSURE: 102 MMHG | BODY MASS INDEX: 33.85 KG/M2 | HEIGHT: 65 IN | WEIGHT: 203.2 LBS | DIASTOLIC BLOOD PRESSURE: 70 MMHG

## 2022-02-18 DIAGNOSIS — G54.0 THORACIC OUTLET SYNDROME: ICD-10-CM

## 2022-02-18 DIAGNOSIS — F32.9 REACTIVE DEPRESSION: ICD-10-CM

## 2022-02-18 DIAGNOSIS — O98.519 COVID-19 AFFECTING PREGNANCY, ANTEPARTUM: ICD-10-CM

## 2022-02-18 DIAGNOSIS — O09.90 HIGH RISK PREGNANCY, ANTEPARTUM: ICD-10-CM

## 2022-02-18 DIAGNOSIS — Z12.31 ENCOUNTER FOR SCREENING MAMMOGRAM FOR MALIGNANT NEOPLASM OF BREAST: Primary | ICD-10-CM

## 2022-02-18 DIAGNOSIS — Z87.59 HISTORY OF GESTATIONAL HYPERTENSION: ICD-10-CM

## 2022-02-18 DIAGNOSIS — R73.09 ELEVATED GLUCOSE TOLERANCE TEST: ICD-10-CM

## 2022-02-18 DIAGNOSIS — Z3A.30 30 WEEKS GESTATION OF PREGNANCY: ICD-10-CM

## 2022-02-18 DIAGNOSIS — Q23.1 BICUSPID AORTIC VALVE: ICD-10-CM

## 2022-02-18 DIAGNOSIS — Z86.718 HISTORY OF DVT (DEEP VEIN THROMBOSIS): ICD-10-CM

## 2022-02-18 DIAGNOSIS — D50.8 IRON DEFICIENCY ANEMIA SECONDARY TO INADEQUATE DIETARY IRON INTAKE: ICD-10-CM

## 2022-02-18 DIAGNOSIS — E03.8 SUBCLINICAL HYPOTHYROIDISM: ICD-10-CM

## 2022-02-18 DIAGNOSIS — U07.1 COVID-19 AFFECTING PREGNANCY, ANTEPARTUM: ICD-10-CM

## 2022-02-18 LAB
SL AMB  POCT GLUCOSE, UA: NEGATIVE
SL AMB POCT URINE PROTEIN: NEGATIVE

## 2022-02-18 PROCEDURE — 81002 URINALYSIS NONAUTO W/O SCOPE: CPT | Performed by: OBSTETRICS & GYNECOLOGY

## 2022-02-18 PROCEDURE — PNV: Performed by: OBSTETRICS & GYNECOLOGY

## 2022-02-18 NOTE — PROGRESS NOTES
Routine Prenatal Visit  [address] [facility], [address], [facility], [address] [facility]    Assessment/Plan:  Cushing is a 39y o  year old  at 32w4d who presents for routine prenatal visit  1  Encounter for screening mammogram for malignant neoplasm of breast  -     POCT urine dip    2  Elevated glucose tolerance test  Comments:  3 hour scheduled     3  Iron deficiency anemia secondary to inadequate dietary iron intake    4  Subclinical hypothyroidism    5  History of gestational hypertension    6  COVID-19 affecting pregnancy, antepartum    7  30 weeks gestation of pregnancy    8  History of DVT (deep vein thrombosis)    9  Thoracic outlet syndrome    10  Bicuspid aortic valve    11  Reactive depression    12  High risk pregnancy, antepartum      + fm   Co of  painful   Lower abd pains   2  X daily, no bleeding, leaking of  Fluid  Pin goes away w  Rest    Report > 4 contractions in an hour  ,  Leaking of fluid      Subjective:     CC: Prenatal care    Ken Falcon is a 39 y o   female who presents for routine prenatal care at 32w4d  Pregnancy ROS: no  leakage of fluid, pelvic pain, or vaginal bleeding   + fetal movement  The following portions of the patient's history were reviewed and updated as appropriate: allergies, current medications, past family history, past medical history, obstetric history, gynecologic history, past social history, past surgical history and problem list       Objective:  /70 (BP Location: Left arm, Patient Position: Sitting, Cuff Size: Standard)   Ht 5' 5" (1 651 m)   Wt 92 2 kg (203 lb 3 2 oz)   LMP 2021 (Exact Date)   BMI 33 81 kg/m²   Pregravid Weight/BMI: Pregravid weight not on file (BMI Could not be calculated)  Current Weight: 92 2 kg (203 lb 3 2 oz)   Total Weight Gain: Not found     Pre- Vitals      Most Recent Value   Prenatal Assessment    Fetal Heart Rate 132-140   Fundal Height (cm) 34 cm   Movement Present   Prenatal Vitals    Blood Pressure 102/70   Weight - Scale 92 2 kg (203 lb 3 2 oz)   Urine Albumin/Glucose    Dilation/Effacement/Station    Vaginal Drainage    Draining Fluid No   Edema    LLE Edema None   RLE Edema None   Facial Edema None           General: Well appearing, no distress  Respiratory: Unlabored breathing  Cardiovascular: Regular rate  Abdomen: Soft, gravid, nontender  Fundal Height: Appropriate for gestational age  Extremities: Warm and well perfused  Non tender

## 2022-03-02 ENCOUNTER — ROUTINE PRENATAL (OUTPATIENT)
Dept: OBGYN CLINIC | Facility: CLINIC | Age: 37
End: 2022-03-02

## 2022-03-02 VITALS
WEIGHT: 201 LBS | BODY MASS INDEX: 33.49 KG/M2 | SYSTOLIC BLOOD PRESSURE: 110 MMHG | DIASTOLIC BLOOD PRESSURE: 70 MMHG | HEIGHT: 65 IN

## 2022-03-02 DIAGNOSIS — Z86.718 HISTORY OF DVT (DEEP VEIN THROMBOSIS): ICD-10-CM

## 2022-03-02 DIAGNOSIS — Z3A.34 34 WEEKS GESTATION OF PREGNANCY: ICD-10-CM

## 2022-03-02 DIAGNOSIS — O98.519 COVID-19 AFFECTING PREGNANCY, ANTEPARTUM: Primary | ICD-10-CM

## 2022-03-02 DIAGNOSIS — D50.8 IRON DEFICIENCY ANEMIA SECONDARY TO INADEQUATE DIETARY IRON INTAKE: ICD-10-CM

## 2022-03-02 DIAGNOSIS — U07.1 COVID-19 AFFECTING PREGNANCY, ANTEPARTUM: Primary | ICD-10-CM

## 2022-03-02 DIAGNOSIS — Z87.59 HISTORY OF GESTATIONAL HYPERTENSION: ICD-10-CM

## 2022-03-02 DIAGNOSIS — E03.8 SUBCLINICAL HYPOTHYROIDISM: ICD-10-CM

## 2022-03-02 LAB
SL AMB  POCT GLUCOSE, UA: ABNORMAL
SL AMB POCT URINE PROTEIN: ABNORMAL

## 2022-03-02 PROCEDURE — PNV: Performed by: OBSTETRICS & GYNECOLOGY

## 2022-03-10 PROBLEM — Z3A.34 34 WEEKS GESTATION OF PREGNANCY: Status: ACTIVE | Noted: 2021-12-27

## 2022-03-10 NOTE — PROGRESS NOTES
Routine Prenatal Visit  [address] [facility], [address], [facility], [address] [facility] [address]    Assessment/Plan:  Jose Ayala is a 39y o  year old  at 34w3d who presents for routine prenatal visit  1  COVID-19 affecting pregnancy, antepartum  Assessment & Plan:  Growth U/S scheduled for next week  2  Iron deficiency anemia secondary to inadequate dietary iron intake    3  Subclinical hypothyroidism    4  History of gestational hypertension    5  History of DVT (deep vein thrombosis)    6  34 weeks gestation of pregnancy  -     POCT urine dip        Subjective:     CC: Prenatal care    Neyda Arciniega is a 39 y o   female who presents for routine prenatal care at 34w3d  Pregnancy ROS: no leakage of fluid, pelvic pain, or vaginal bleeding  normal fetal movement  The following portions of the patient's history were reviewed and updated as appropriate: allergies, current medications, past family history, past medical history, obstetric history, gynecologic history, past social history, past surgical history and problem list       Objective:  /70 (BP Location: Left arm, Patient Position: Sitting, Cuff Size: Standard)   Ht 5' 5" (1 651 m)   Wt 91 2 kg (201 lb)   LMP 2021 (Exact Date)   BMI 33 45 kg/m²   Pregravid Weight/BMI: Pregravid weight not on file (BMI Could not be calculated)  Current Weight: 91 2 kg (201 lb)   Total Weight Gain: Not found  Pre- Vitals      Most Recent Value   Prenatal Assessment    Fetal Heart Rate 140   Fundal Height (cm) 35 cm   Movement Present   Presentation Vertex   Prenatal Vitals    Blood Pressure 110/70   Weight - Scale 91 2 kg (201 lb)   Urine Albumin/Glucose    Dilation/Effacement/Station    Vaginal Drainage    Edema            General: Well appearing, no distress  Respiratory: Unlabored breathing  Cardiovascular: Regular rate    Abdomen: Soft, gravid, nontender  Fundal Height: Appropriate for gestational age   Extremities: Warm and well perfused  Non tender

## 2022-03-11 ENCOUNTER — ULTRASOUND (OUTPATIENT)
Dept: PERINATAL CARE | Facility: OTHER | Age: 37
End: 2022-03-11
Payer: COMMERCIAL

## 2022-03-11 VITALS
HEIGHT: 65 IN | DIASTOLIC BLOOD PRESSURE: 72 MMHG | WEIGHT: 204.8 LBS | BODY MASS INDEX: 34.12 KG/M2 | HEART RATE: 98 BPM | SYSTOLIC BLOOD PRESSURE: 114 MMHG

## 2022-03-11 DIAGNOSIS — Q23.1 BICUSPID AORTIC VALVE: ICD-10-CM

## 2022-03-11 DIAGNOSIS — Z87.59 HISTORY OF GESTATIONAL HYPERTENSION: ICD-10-CM

## 2022-03-11 DIAGNOSIS — Z3A.35 35 WEEKS GESTATION OF PREGNANCY: ICD-10-CM

## 2022-03-11 DIAGNOSIS — R73.09 ELEVATED GLUCOSE TOLERANCE TEST: ICD-10-CM

## 2022-03-11 DIAGNOSIS — O98.513 COVID-19 AFFECTING PREGNANCY IN THIRD TRIMESTER: ICD-10-CM

## 2022-03-11 DIAGNOSIS — U07.1 COVID-19 AFFECTING PREGNANCY IN THIRD TRIMESTER: ICD-10-CM

## 2022-03-11 DIAGNOSIS — O99.213 OBESITY COMPLICATING PREGNANCY, THIRD TRIMESTER: Primary | ICD-10-CM

## 2022-03-11 PROCEDURE — 76816 OB US FOLLOW-UP PER FETUS: CPT | Performed by: OBSTETRICS & GYNECOLOGY

## 2022-03-11 PROCEDURE — 99214 OFFICE O/P EST MOD 30 MIN: CPT | Performed by: OBSTETRICS & GYNECOLOGY

## 2022-03-11 NOTE — PROGRESS NOTES
Corky Thomson has no complaints today  She reports regular fetal movements and does not report any problems  She is here today at 35w4d for an ultrasound for growth  She is here with her partner Cindy Georges    Problem list:  1  Maternal bicuspid aortic valve-she reports she has had an echo in this pregnancy that was reassuring and has developed some palpitations which she is followed by her cardiologist for  2  COVID-19 infection in December for which she was hospitalized for 7 days for COVID pneumonia  Declines a COVID vaccine  3  History of a right upper extremity DVT-felt to be due to thoracic outlet syndrome status post repair  Thrombophilia negative  She declines the use of lovenox  4  History of gestational hypertension  5  Elevated Glucsimone has not completed a 3 hour glucose tolerance test yet but she has a glucometer at home that she checks her own sugars so I encouraged her to check fasting blood sugars and 2 hour post prandials for week and have her OB doctor review them as her screen for diabetes  6  Iron deficiency anemia  7  Subclinical hypothyroidism on levothyroxine 75 micrograms daily  8  AMA-she is on baby aspirin till 36 weeks    Ultrasound findings: The ultrasound today shows normal interval fetal growth and fluid  Pregnancy ultrasound has limitations and is unable to detect all forms of fetal congenital abnormalities  The inaccuracy in the EFW can be off by 1 lb either way in the third trimester  Specific counseling was provided on the following problems:  1  We discussed the signs and symptoms of round ligament pain, pelvic pressure and  labor  We also discussed the signs and symptoms of premature rupture membranes  Should she note any sign of leaking of fluid or regular contractions recommend she contact her OB provider  2   She can stop her thyroid medication after delivery but would recommend a 6 week postpartum TSH  Follow up recommended:   1   No further follow-up ultrasounds are recommended at this time  Pre visit time reviewing her records   15 minutes  Face to face time 5 minutes  Post visit time on documentation of note, updating her problem list, adding orders and prescriptions 5 minutes  Procedures that were completed today were charged separately  The level of decision making was moderate complexity      Jose Davalos MD

## 2022-03-11 NOTE — LETTER
March 12, 2022     [address]  [facility]    Patient: Kendall Thomson   YOB: 1985   Date of Visit: 3/11/2022       Dear Dr Marrero Sour: Thank you for referring Fernando Lino to me for evaluation  Below are my notes for this consultation  If you have questions, please do not hesitate to call me  I look forward to following your patient along with you  Sincerely,        Torito Pittman MD        CC: No Recipients  Torito Pittman MD  3/12/2022  1:20 PM  Sign when Signing Visit  Kendall Thomson has no complaints today  She reports regular fetal movements and does not report any problems  She is here today at 35w4d for an ultrasound for growth  She is here with her partner Moustapha Martínez    Problem list:  1  Maternal bicuspid aortic valve-she reports she has had an echo in this pregnancy that was reassuring and has developed some palpitations which she is followed by her cardiologist for  2  COVID-19 infection in December for which she was hospitalized for 7 days for COVID pneumonia  Declines a COVID vaccine  3  History of a right upper extremity DVT-felt to be due to thoracic outlet syndrome status post repair  Thrombophilia negative  She declines the use of lovenox  4  History of gestational hypertension  5  Elevated Glucola has not completed a 3 hour glucose tolerance test yet but she has a glucometer at home that she checks her own sugars so I encouraged her to check fasting blood sugars and 2 hour post prandials for week and have her OB doctor review them as her screen for diabetes  6  Iron deficiency anemia  7  Subclinical hypothyroidism on levothyroxine 75 micrograms daily  8  AMA-she is on baby aspirin till 36 weeks    Ultrasound findings: The ultrasound today shows normal interval fetal growth and fluid  Pregnancy ultrasound has limitations and is unable to detect all forms of fetal congenital abnormalities    The inaccuracy in the EFW can be off by 1 lb either way in the third trimester  Specific counseling was provided on the following problems:  1  We discussed the signs and symptoms of round ligament pain, pelvic pressure and  labor  We also discussed the signs and symptoms of premature rupture membranes  Should she note any sign of leaking of fluid or regular contractions recommend she contact her OB provider  2   She can stop her thyroid medication after delivery but would recommend a 6 week postpartum TSH  Follow up recommended:   1  No further follow-up ultrasounds are recommended at this time  Pre visit time reviewing her records   15 minutes  Face to face time 5 minutes  Post visit time on documentation of note, updating her problem list, adding orders and prescriptions 5 minutes  Procedures that were completed today were charged separately  The level of decision making was moderate complexity      Rachel Mesa MD

## 2022-03-11 NOTE — LETTER
March 12, 2022     [address]  [facility] 81    Patient: Blaine Thomson   YOB: 1985   Date of Visit: 3/11/2022       Dear Dr Ayaan Logan: Thank you for referring Nany Ryan to me for evaluation  Below are my notes for this consultation  If you have questions, please do not hesitate to call me  I look forward to following your patient along with you  Sincerely,        Frederic Beltran MD        CC: No Recipients  Frederic Beltran MD  3/12/2022 12:59 PM  Incomplete  Blaine Thomson has no complaints today  She reports regular fetal movements and does not report any problems  She is here today at 35w4d for an ultrasound for growth  She is here with her partner Christianna Alpers    Problem list:  1  Maternal bicuspid aortic valve-she reports she has had an echo in this pregnancy that was reassuring and has developed some palpitations which she is followed by her cardiologist for  2  COVID-19 infection in December for which she was hospitalized for 7 days for COVID pneumonia  Declines a COVID vaccine  3  History of a right upper extremity DVT-felt to be due to thoracic outlet syndrome status post repair  Thrombophilia negative  She declines the use of lovenox  4  History of gestational hypertension  5  Elevated Glucola has not completed a 3 hour glucose tolerance test yet but she has a glucometer at home that she checks her own sugars so I encouraged her to check fasting blood sugars and 2 hour post prandials for week and have her OB doctor review them as her screen for diabetes  6  Iron deficiency anemia  7  Subclinical hypothyroidism on levothyroxine 75 micrograms daily  8  AMA-she is on baby aspirin till 36 weeks    Ultrasound findings: The ultrasound today shows normal interval fetal growth and fluid  Pregnancy ultrasound has limitations and is unable to detect all forms of fetal congenital abnormalities    The inaccuracy in the EFW can be off by 1 lb either way in the third trimester  Specific counseling was provided on the following problems:  1  We discussed the signs and symptoms of round ligament pain, pelvic pressure and  labor  We also discussed the signs and symptoms of premature rupture membranes  Should she note any sign of leaking of fluid or regular contractions recommend she contact her OB provider  2   She can stop her thyroid medication after delivery but would recommend a 6 week postpartum TSH  Follow up recommended:   1  No further follow-up ultrasounds are recommended at this time  Pre visit time reviewing her records   15 minutes  Face to face time 5 minutes  Post visit time on documentation of note, updating her problem list, adding orders and prescriptions 5 minutes  Procedures that were completed today were charged separately  The level of decision making was moderate complexity  MD Marco Ortega MD  3/11/2022 11:01 AM  Sign when Signing Visit  Amaury Thomson has no complaints today  She reports regular fetal movements and does not report any problems  She is here today at 35w4d for an ultrasound for growth  She is here with her partner Samantha Patel    Problem list:  9  Maternal bicuspid aortic valve-she reports she has had an echo in this pregnancy that was reassuring and has developed some palpitations which she is followed by her cardiologist for  10  COVID-19 infection in December for which she was hospitalized for 7 days for COVID pneumonia  Declines a COVID vaccine  11  History of a DVT-felt to be due to thoracic outlet syndrome status post repair  Thrombophilia negative  15  History of gestational hypertension  13   Elevated Glucola has not completed a 3 hour glucose tolerance test yet but she has a glucometer at home that she checks her own sugars so I encouraged her to check fasting blood sugars and 2 hour post prandials for week and have her OB doctor review them as her screen for diabetes  14  Iron deficiency anemia  15  Subclinical hypothyroidism on levothyroxine 75 micrograms daily  16  AMA-she is on baby aspirin till 36 weeks    Ultrasound findings: The ultrasound today shows normal interval fetal growth and fluid  Pregnancy ultrasound has limitations and is unable to detect all forms of fetal congenital abnormalities  The inaccuracy in the EFW can be off by 1 lb either way in the third trimester  Specific counseling was provided on the following problems:  1  We discussed the signs and symptoms of round ligament pain and  labor  We also discussed the signs and symptoms of recurrent pressure membranes  2   She can stop her thyroid medication after delivery but would recommend a 6 week postpartum TSH  Follow up recommended:   2  No further follow-up ultrasounds are recommended at this time    Pre visit time reviewing her records   *** minutes  Face to face time *** minutes  Post visit time on documentation of note, updating her problem list, adding orders and prescriptions *** minutes  Procedures that were completed today were charged separately     The level of decision making was {decision level:46492}    Mojgan Hughes MD

## 2022-03-12 ENCOUNTER — HOSPITAL ENCOUNTER (EMERGENCY)
Facility: HOSPITAL | Age: 37
Discharge: HOME | End: 2022-03-12
Admitting: OBSTETRICS & GYNECOLOGY
Payer: COMMERCIAL

## 2022-03-12 ENCOUNTER — TELEPHONE (OUTPATIENT)
Dept: OBGYN CLINIC | Facility: CLINIC | Age: 37
End: 2022-03-12

## 2022-03-12 VITALS
OXYGEN SATURATION: 97 % | SYSTOLIC BLOOD PRESSURE: 130 MMHG | HEART RATE: 83 BPM | DIASTOLIC BLOOD PRESSURE: 79 MMHG | BODY MASS INDEX: 33.99 KG/M2 | TEMPERATURE: 97.8 F | HEIGHT: 65 IN | WEIGHT: 204 LBS | RESPIRATION RATE: 20 BRPM

## 2022-03-12 PROBLEM — Z3A.35 35 WEEKS GESTATION OF PREGNANCY: Status: ACTIVE | Noted: 2021-12-27

## 2022-03-12 PROBLEM — O98.513 COVID-19 AFFECTING PREGNANCY IN THIRD TRIMESTER: Status: ACTIVE | Noted: 2022-01-20

## 2022-03-12 LAB
ALBUMIN SERPL BCP-MCNC: 2.5 G/DL (ref 3.5–5)
ALP SERPL-CCNC: 138 U/L (ref 46–116)
ALT SERPL W P-5'-P-CCNC: 12 U/L (ref 12–78)
ANION GAP SERPL CALCULATED.3IONS-SCNC: 11 MMOL/L (ref 4–13)
AST SERPL W P-5'-P-CCNC: 9 U/L (ref 5–45)
BILIRUB SERPL-MCNC: 0.3 MG/DL (ref 0.2–1)
BUN SERPL-MCNC: 9 MG/DL (ref 5–25)
CALCIUM ALBUM COR SERPL-MCNC: 9.4 MG/DL (ref 8.3–10.1)
CALCIUM SERPL-MCNC: 8.2 MG/DL (ref 8.3–10.1)
CHLORIDE SERPL-SCNC: 103 MMOL/L (ref 100–108)
CO2 SERPL-SCNC: 22 MMOL/L (ref 21–32)
CREAT SERPL-MCNC: 0.6 MG/DL (ref 0.6–1.3)
CREAT UR-MCNC: 136 MG/DL
ERYTHROCYTE [DISTWIDTH] IN BLOOD BY AUTOMATED COUNT: 12.8 % (ref 11.6–15.1)
GFR SERPL CREATININE-BSD FRML MDRD: 116 ML/MIN/1.73SQ M
GLUCOSE SERPL-MCNC: 116 MG/DL (ref 65–140)
HCT VFR BLD AUTO: 32.9 % (ref 34.8–46.1)
HGB BLD-MCNC: 10.6 G/DL (ref 11.5–15.4)
MCH RBC QN AUTO: 29.6 PG (ref 26.8–34.3)
MCHC RBC AUTO-ENTMCNC: 32.2 G/DL (ref 31.4–37.4)
MCV RBC AUTO: 92 FL (ref 82–98)
PLATELET # BLD AUTO: 239 THOUSANDS/UL (ref 149–390)
PMV BLD AUTO: 10.6 FL (ref 8.9–12.7)
POTASSIUM SERPL-SCNC: 3.4 MMOL/L (ref 3.5–5.3)
PROT SERPL-MCNC: 6.2 G/DL (ref 6.4–8.2)
PROT UR-MCNC: 18 MG/DL
PROT/CREAT UR: 0.13 MG/G{CREAT} (ref 0–0.1)
RBC # BLD AUTO: 3.58 MILLION/UL (ref 3.81–5.12)
SODIUM SERPL-SCNC: 136 MMOL/L (ref 136–145)
URATE SERPL-MCNC: 3.2 MG/DL (ref 2–6.8)
WBC # BLD AUTO: 12.02 THOUSAND/UL (ref 4.31–10.16)

## 2022-03-12 PROCEDURE — 85027 COMPLETE CBC AUTOMATED: CPT | Performed by: OBSTETRICS & GYNECOLOGY

## 2022-03-12 PROCEDURE — NC001 PR NO CHARGE: Performed by: OBSTETRICS & GYNECOLOGY

## 2022-03-12 PROCEDURE — 99213 OFFICE O/P EST LOW 20 MIN: CPT

## 2022-03-12 PROCEDURE — 87150 DNA/RNA AMPLIFIED PROBE: CPT | Performed by: OBSTETRICS & GYNECOLOGY

## 2022-03-12 PROCEDURE — 84550 ASSAY OF BLOOD/URIC ACID: CPT | Performed by: OBSTETRICS & GYNECOLOGY

## 2022-03-12 PROCEDURE — 84156 ASSAY OF PROTEIN URINE: CPT | Performed by: OBSTETRICS & GYNECOLOGY

## 2022-03-12 PROCEDURE — 80053 COMPREHEN METABOLIC PANEL: CPT | Performed by: OBSTETRICS & GYNECOLOGY

## 2022-03-12 PROCEDURE — 82570 ASSAY OF URINE CREATININE: CPT | Performed by: OBSTETRICS & GYNECOLOGY

## 2022-03-12 RX ORDER — ACETAMINOPHEN 160 MG/5ML
650 SUSPENSION, ORAL (FINAL DOSE FORM) ORAL ONCE
Status: COMPLETED | OUTPATIENT
Start: 2022-03-12 | End: 2022-03-12

## 2022-03-12 RX ADMIN — ACETAMINOPHEN 649.6 MG: 650 SUSPENSION ORAL at 22:18

## 2022-03-13 NOTE — PROGRESS NOTES
Triage Note - OB  Eva Thomson 39 y o  female MRN: [id number]  Unit/Bed#: LD TRIAGE  Encounter: [id number]    Chief Complaint:     SUBJECTIVE    HPI: 39 y o   at 35w5d with c/o high blood pressure at home  She was feeling lightheaded  She took her blood pressure and it was elevated  She called Dr Tim Vang and was told to come in  She is also complaining of a mild headache  Lightheadedness resolved with rest here in triage  No current RUQ pain but occasional musculoskeletal pain in upper abdomen where baby kicks  Denies vision changes  BP1: 166/99  BP2: 133/90  BP 120s/70-80    Neg  vaginal bleeding  Neg  loss of fluid  Occasional contractions  Normal fetal movement    OBJECTIVE  Previous delivery(s) were vaginal     Complications of pregnancy: hypothyroidism    Blood type: A+    Estimated Date of Delivery: 22    Vitals:   Vitals:    22 2155   BP: 126/75   Pulse: 84   Resp:    Temp:    SpO2:      Body mass index is 33 95 kg/m²        FHR: 140s, reactive  [place]: rare    SVE: 50/-4     KENNETH  Q1: 2 04   Q2: 4 98  Q3: 4 45  Q 4: 0  Total: 11 47     TAUS - Presentation: vtx    Physical exam:  Gen: NAD  Abd: S/NT/ND  Extrem: trace edema    Labs:   Admission on 2022   Component Date Value    WBC 2022 12 02*    RBC 2022 3 58*    Hemoglobin 2022 10 6*    Hematocrit 2022 32 9*    MCV 2022 92     MCH 2022 29 6     MCHC 2022 32 2     RDW 2022 12 8     Platelets  239     MPV 2022 10 6     Sodium 2022 136     Potassium 2022 3 4*    Chloride 2022 103     CO2 2022 22     ANION GAP 2022 11     BUN 2022 9     Creatinine 2022 0 60     Glucose 2022 116     Calcium 2022 8 2*    Corrected Calcium 2022 9 4     AST 2022 9     ALT 2022 12     Alkaline Phosphatase 2022 138*    Total Protein 2022 6 2*    Albumin 2022 2 5*    Total Bilirubin 2022 0 30     eGFR 2022 116     Creatinine, Ur 2022 136 0     Protein Urine Random 2022 18     Prot/Creat Ratio, Ur 2022 0 13*    Uric Acid 2022 3 2        A/P  39 y o  female  at 35w5d with c/o elevated BP at home  Initially elevated here but now normal after observation period in triage  Normal lab  Reviewed precautions to return for symptoms of hypertension  RTO in 3 days for prenatal visit and BP check           Last Rodriguez MD   OB-GYN  3/12/2022 10:05 PM

## 2022-03-14 LAB — GP B STREP DNA SPEC QL NAA+PROBE: NEGATIVE

## 2022-03-15 ENCOUNTER — ROUTINE PRENATAL (OUTPATIENT)
Dept: OBGYN CLINIC | Facility: CLINIC | Age: 37
End: 2022-03-15

## 2022-03-15 ENCOUNTER — HOSPITAL ENCOUNTER (OUTPATIENT)
Facility: HOSPITAL | Age: 37
Discharge: HOME | End: 2022-03-15
Attending: STUDENT IN AN ORGANIZED HEALTH CARE EDUCATION/TRAINING PROGRAM | Admitting: STUDENT IN AN ORGANIZED HEALTH CARE EDUCATION/TRAINING PROGRAM
Payer: COMMERCIAL

## 2022-03-15 VITALS
RESPIRATION RATE: 18 BRPM | SYSTOLIC BLOOD PRESSURE: 130 MMHG | HEART RATE: 99 BPM | DIASTOLIC BLOOD PRESSURE: 83 MMHG | TEMPERATURE: 97.9 F

## 2022-03-15 VITALS — BODY MASS INDEX: 33.61 KG/M2 | WEIGHT: 202 LBS | DIASTOLIC BLOOD PRESSURE: 84 MMHG | SYSTOLIC BLOOD PRESSURE: 144 MMHG

## 2022-03-15 DIAGNOSIS — Q23.1 BICUSPID AORTIC VALVE: ICD-10-CM

## 2022-03-15 DIAGNOSIS — R73.09 ELEVATED GLUCOSE TOLERANCE TEST: ICD-10-CM

## 2022-03-15 DIAGNOSIS — Z3A.36 36 WEEKS GESTATION OF PREGNANCY: ICD-10-CM

## 2022-03-15 DIAGNOSIS — D50.8 IRON DEFICIENCY ANEMIA SECONDARY TO INADEQUATE DIETARY IRON INTAKE: ICD-10-CM

## 2022-03-15 DIAGNOSIS — O13.3 GESTATIONAL HYPERTENSION, THIRD TRIMESTER: Primary | ICD-10-CM

## 2022-03-15 PROBLEM — O13.9 GESTATIONAL HYPERTENSION: Status: ACTIVE | Noted: 2022-03-15

## 2022-03-15 LAB
ALBUMIN SERPL BCP-MCNC: 2.5 G/DL (ref 3.5–5)
ALP SERPL-CCNC: 147 U/L (ref 46–116)
ALT SERPL W P-5'-P-CCNC: 13 U/L (ref 12–78)
ANION GAP SERPL CALCULATED.3IONS-SCNC: 13 MMOL/L (ref 4–13)
AST SERPL W P-5'-P-CCNC: 15 U/L (ref 5–45)
ATRIAL RATE: 98 BPM
BILIRUB SERPL-MCNC: 0.3 MG/DL (ref 0.2–1)
BUN SERPL-MCNC: 8 MG/DL (ref 5–25)
CALCIUM ALBUM COR SERPL-MCNC: 9.6 MG/DL (ref 8.3–10.1)
CALCIUM SERPL-MCNC: 8.4 MG/DL (ref 8.3–10.1)
CHLORIDE SERPL-SCNC: 101 MMOL/L (ref 100–108)
CO2 SERPL-SCNC: 21 MMOL/L (ref 21–32)
CREAT SERPL-MCNC: 0.59 MG/DL (ref 0.6–1.3)
CREAT UR-MCNC: 75 MG/DL
ERYTHROCYTE [DISTWIDTH] IN BLOOD BY AUTOMATED COUNT: 13.1 % (ref 11.6–15.1)
GFR SERPL CREATININE-BSD FRML MDRD: 117 ML/MIN/1.73SQ M
GLUCOSE SERPL-MCNC: 164 MG/DL (ref 65–140)
HCT VFR BLD AUTO: 35.2 % (ref 34.8–46.1)
HGB BLD-MCNC: 11.5 G/DL (ref 11.5–15.4)
MCH RBC QN AUTO: 29.8 PG (ref 26.8–34.3)
MCHC RBC AUTO-ENTMCNC: 32.7 G/DL (ref 31.4–37.4)
MCV RBC AUTO: 91 FL (ref 82–98)
P AXIS: 32 DEGREES
PLATELET # BLD AUTO: 239 THOUSANDS/UL (ref 149–390)
PMV BLD AUTO: 11 FL (ref 8.9–12.7)
POTASSIUM SERPL-SCNC: 3.6 MMOL/L (ref 3.5–5.3)
PR INTERVAL: 112 MS
PROT SERPL-MCNC: 6.6 G/DL (ref 6.4–8.2)
PROT UR-MCNC: 16 MG/DL
PROT/CREAT UR: 0.21 MG/G{CREAT} (ref 0–0.1)
QRS AXIS: 0 DEGREES
QRSD INTERVAL: 78 MS
QT INTERVAL: 350 MS
QTC INTERVAL: 446 MS
RBC # BLD AUTO: 3.86 MILLION/UL (ref 3.81–5.12)
SL AMB  POCT GLUCOSE, UA: NORMAL
SL AMB POCT URINE PROTEIN: NORMAL
SODIUM SERPL-SCNC: 135 MMOL/L (ref 136–145)
T WAVE AXIS: -9 DEGREES
URATE SERPL-MCNC: 3.9 MG/DL (ref 2–6.8)
VENTRICULAR RATE: 98 BPM
WBC # BLD AUTO: 11.57 THOUSAND/UL (ref 4.31–10.16)

## 2022-03-15 PROCEDURE — 84550 ASSAY OF BLOOD/URIC ACID: CPT | Performed by: OBSTETRICS & GYNECOLOGY

## 2022-03-15 PROCEDURE — 84156 ASSAY OF PROTEIN URINE: CPT | Performed by: OBSTETRICS & GYNECOLOGY

## 2022-03-15 PROCEDURE — 93005 ELECTROCARDIOGRAM TRACING: CPT

## 2022-03-15 PROCEDURE — 93010 ELECTROCARDIOGRAM REPORT: CPT | Performed by: INTERNAL MEDICINE

## 2022-03-15 PROCEDURE — 80053 COMPREHEN METABOLIC PANEL: CPT | Performed by: OBSTETRICS & GYNECOLOGY

## 2022-03-15 PROCEDURE — NC001 PR NO CHARGE: Performed by: OBSTETRICS & GYNECOLOGY

## 2022-03-15 PROCEDURE — 85027 COMPLETE CBC AUTOMATED: CPT | Performed by: OBSTETRICS & GYNECOLOGY

## 2022-03-15 PROCEDURE — 99214 OFFICE O/P EST MOD 30 MIN: CPT

## 2022-03-15 PROCEDURE — 82570 ASSAY OF URINE CREATININE: CPT | Performed by: OBSTETRICS & GYNECOLOGY

## 2022-03-15 PROCEDURE — PNV: Performed by: OBSTETRICS & GYNECOLOGY

## 2022-03-15 RX ORDER — ACETAMINOPHEN 325 MG/1
650 TABLET ORAL EVERY 6 HOURS PRN
Status: DISCONTINUED | OUTPATIENT
Start: 2022-03-15 | End: 2022-03-15 | Stop reason: HOSPADM

## 2022-03-15 NOTE — PROGRESS NOTES
Triage Note - OB  Maria Luisa Thomson 39 y o  female MRN: [id number]  Unit/Bed#: LD TRIAGE 2 Encounter: [id number]    Chief Complaint:     SUBJECTIVE    HPI: 39 y o   at 36w1d sent from office for elevated BP  Neg vaginal bleeding  Neg loss of fluid   Occasional contractions  Pos fetal movement    She denies HA, scotomata, abd pain  History of migraine headache and bicuspid aortic valva  She is also complaining of palpations  OBJECTIVE  Previous delivery(s) were vaginal     GBS: negative  Blood type: A+    Estimated Date of Delivery: 22    Vitals:   Vitals:    03/15/22 1555   BP: 130/83   Pulse: 99   Resp:    Temp:          FHR: 140s, moderate variability, reactive, no decels  [place]: irregular    BP1: 144/84  BP2: 138/95    Bps 120s/80s after observation period       Physical exam:  Lungs: CTA b/l  CVS: RRR  Abd: s/nt/nd    Labs:   Admission on 03/15/2022, Discharged on 03/15/2022   Component Date Value    WBC 03/15/2022 11 57*    RBC 03/15/2022 3 86     Hemoglobin 03/15/2022 11 5     Hematocrit 03/15/2022 35 2     MCV 03/15/2022 91     MCH 03/15/2022 29 8     MCHC 03/15/2022 32 7     RDW 03/15/2022 13 1     Platelets  239     MPV 03/15/2022 11 0     Sodium 03/15/2022 135*    Potassium 03/15/2022 3 6     Chloride 03/15/2022 101     CO2 03/15/2022 21     ANION GAP 03/15/2022 13     BUN 03/15/2022 8     Creatinine 03/15/2022 0 59*    Glucose 03/15/2022 164*    Calcium 03/15/2022 8 4     Corrected Calcium 03/15/2022 9 6     AST 03/15/2022 15     ALT 03/15/2022 13     Alkaline Phosphatase 03/15/2022 147*    Total Protein 03/15/2022 6 6     Albumin 03/15/2022 2 5*    Total Bilirubin 03/15/2022 0 30     eGFR 03/15/2022 117     Creatinine, Ur 03/15/2022 75 0     Protein Urine Random 03/15/2022 16     Prot/Creat Ratio, Ur 03/15/2022 0 21*    Uric Acid 03/15/2022 3 9     Ventricular Rate 03/15/2022 98     Atrial Rate 03/15/2022 98     MS Interval 03/15/2022 112     QRSD Interval 03/15/2022 78     QT Interval 03/15/2022 350     QTC Interval 03/15/2022 446     P Axis 03/15/2022 32     QRS Axis 03/15/2022 0     T Wave Axis 03/15/2022 -9    Routine Prenatal on 03/15/2022   Component Date Value    POCT URINE PROTEIN 03/15/2022 neg     GLUCOSE, UA 03/15/2022 neg      EKG: NSR    A/P  39 y o  female  at 36w1d, meets criteria for GHTN  Reviewed precautions for symptoms of preeclampsia  She will follow-up with her cardiologist regarding her palpitations  NST/KENNETH on 3/18  Plan for IOL on 3/21      Herberth Olivera MD   OB-GYN  3/15/2022 5:18 PM

## 2022-03-15 NOTE — ASSESSMENT & PLAN NOTE
Appears not done  Pt told MFM she checked at home  They asked her to review in office  Today we dealt w/ her HTN issues so I didn't ask her

## 2022-03-15 NOTE — ASSESSMENT & PLAN NOTE
H/o gestational HTN last pregnancy per pt  Seen L&D 3/12/2022 - one elevated BP, others normal - labs  Normal   D/c home w/ plan for f/u in office 3/15/2022  Today pt states Bps at home 120-130/80-100s  In office is 144/84  States HA on Saturday resolved  Today feels "funny"  Discussed I feel now she meet def of gHTN (before only 1 elevated BP) and recom she return to L&D for repeat labs to r/o preeclampsia  Discussed w/ pt will likely discuss planning delivery around 37 weeks given her new diagnosis and h/o gHTN  Dr Regina Post on L&D made aware

## 2022-03-15 NOTE — PROGRESS NOTES
Routine Prenatal Visit  [address], [address], [place], [address] [facility]    Assessment/Plan:  Rodrigue Rodríguez is a 39y o  year old  at 36w1d who presents for routine prenatal visit  1  Gestational hypertension, third trimester  Assessment & Plan:  H/o gestational HTN last pregnancy per pt  Seen L&D 3/12/2022 - one elevated BP, others normal - labs  Normal   D/c home w/ plan for f/u in office 3/15/2022  Today pt states Bps at home 120-130/80-100s  In office is 144/84  States HA on Saturday resolved  Today feels "funny"  Discussed I feel now she meet def of gHTN (before only 1 elevated BP) and recom she return to L&D for repeat labs to r/o preeclampsia  Discussed w/ pt will likely discuss planning delivery around 37 weeks given her new diagnosis and h/o gHTN  Dr Lilia Morales on L&D made aware  2  Elevated glucose tolerance test  Assessment & Plan:  Appears not done  Pt told MFM she checked at home  They asked her to review in office  Today we dealt w/ her HTN issues so I didn't ask her  3  Bicuspid aortic valve    4  Iron deficiency anemia secondary to inadequate dietary iron intake    5  36 weeks gestation of pregnancy  -     POCT urine dip        Next OB Visit 1 weeks  - but to go to L&D now for evaluation    Subjective:     CC: Prenatal care    Jazlyn Nagy is a 39 y o   female who presents for routine prenatal care at 36w1d  Pregnancy ROS: no leakage of fluid, pelvic pain, or vaginal bleeding  normal fetal movement      The following portions of the patient's history were reviewed and updated as appropriate: allergies, current medications, past family history, past medical history, obstetric history, gynecologic history, past social history, past surgical history and problem list       Objective:  /84   Wt 91 6 kg (202 lb)   LMP 2021 (Exact Date)   BMI 33 61 kg/m²   Pregravid Weight/BMI: Pregravid weight not on file (BMI Could not be calculated)  Current Weight: 91 6 kg (202 lb)   Total Weight Gain: Not found  Pre-Akira Vitals      Most Recent Value   Prenatal Assessment    Fetal Heart Rate 145   Fundal Height (cm) 36 cm   Movement Present   Presentation Vertex   Prenatal Vitals    Blood Pressure 144/84   Weight - Scale 91 6 kg (202 lb)   Urine Albumin/Glucose    Dilation/Effacement/Station    Cervical Dilation 2   Cervical Effacement 50   Fetal Station -2   Vaginal Drainage    Draining Fluid No   Edema            General: Well appearing, no distress  Abdomen: Soft, gravid, nontender  Fundal Height: Appropriate for gestational age  Extremities: Warm and well perfused  Non tender

## 2022-03-18 ENCOUNTER — HOSPITAL ENCOUNTER (OUTPATIENT)
Facility: HOSPITAL | Age: 37
Discharge: HOME | End: 2022-03-18
Attending: OBSTETRICS & GYNECOLOGY | Admitting: OBSTETRICS & GYNECOLOGY
Payer: COMMERCIAL

## 2022-03-18 VITALS — HEART RATE: 97 BPM | SYSTOLIC BLOOD PRESSURE: 122 MMHG | DIASTOLIC BLOOD PRESSURE: 73 MMHG | RESPIRATION RATE: 20 BRPM

## 2022-03-18 PROBLEM — O99.810 GESTATIONAL HYPERGLYCEMIA: Status: ACTIVE | Noted: 2022-03-18

## 2022-03-18 PROCEDURE — NC001 PR NO CHARGE: Performed by: OBSTETRICS & GYNECOLOGY

## 2022-03-18 PROCEDURE — 99213 OFFICE O/P EST LOW 20 MIN: CPT

## 2022-03-18 NOTE — DISCHARGE INSTRUCTIONS
Hypertension During Pregnancy   AMBULATORY CARE:   What you need to know about hypertension during pregnancy:  Hypertension is high blood pressure (BP)  Normal BP is 119/79 or lower  Hypertension during pregnancy is a BP of 140/90 or higher  Severe hypertension is at least 160/110  One or both numbers of these readings may be high  Hypertension may start before you become pregnant, or develop during pregnancy  Pregnancy can cause high BP, or it may develop because of other risk factors you had before you became pregnant  It is important to get screened and treated for an elevated BP or hypertension during pregnancy  This can prevent problems for you and your baby  Types of hypertension during pregnancy:   · Chronic hypertension  is high BP that starts before pregnancy or develops within the first 20 weeks and does not go away after delivery  · Gestational hypertension  means you develop new high BP after week 20 of your pregnancy  Gestational hypertension usually goes away after delivery, but it increases your risk for future chronic hypertension  · Chronic hypertension with superimposed preeclampsia  is preeclampsia in a woman with a history of hypertension before pregnancy  It can also be preeclampsia that develops before week 20 of pregnancy  · Preeclampsia  is high BP that usually develops after week 20 of pregnancy  It can also develop within 4 weeks of delivery  You may also have protein in your urine or damage to organs such as your kidneys or liver  Preeclampsia can lead to life-threatening conditions such as a stroke or eclampsia (seizures from severe high BP)  · HELLP syndrome  is a life-threatening complication of high BP that may develop between week 20 of pregnancy and a few days after delivery  It causes destruction of red blood cells, liver problems, and blood clotting problems  Your risk for HELLP syndrome increases if you have a history of preeclampsia      Signs and symptoms  do not always happen with high BP  Hypertension may only be found during routine pregnancy checkups  You may have any of the following, depending on the kind of hypertension you have:  · Headache or confusion    · Spotted or blurred vision    · Chest pain, trouble breathing, or a fast heartbeat    · Dizziness or weakness    · Abdominal pain, or pain on the right side of your upper abdomen, behind the ribs    · Nausea and vomiting    · Ringing or buzzing in your ears    · Sudden weight gain or swelling in your face, arms, or legs    · Severe fatigue that does not get better with rest    Call your local emergency number (911 in the [address]), or have someone else call if:   · You have a seizure  · You have chest pain  · You faint or lose consciousness  · You have any of the following signs of a heart attack:      ? Squeezing, pressure, or pain in your chest    ? You may  also have any of the following:     § Discomfort or pain in your back, neck, jaw, stomach, or arm    § Shortness of breath    § Nausea or vomiting    § Lightheadedness or a sudden cold sweat    Seek care immediately if:   · You have a severe headache or vision loss  · You have weakness in an arm or leg  · You have fluid or blood leaking from your vagina that does not stop  · You feel a gush of fluid from your vagina  · You feel a change in your baby's movement, or you feel fewer than 6 to 10 movements in an hour  Call your doctor or obstetrician if:   · You urinate less than usual or stop urinating  · You have severe abdominal pain with or without nausea and vomiting  · You have new or increased swelling in your face or hands, or sudden weight gain  · You have questions or concerns about your condition or care  How hypertension during pregnancy is diagnosed and treated: Your healthcare provider will ask about your symptoms  He or she will check your BP 2 times, at least 4 hours apart   You may need blood or urine tests to check for problems caused by hypertension  Treatment depends on how high your BP is and how many weeks you are into your pregnancy  Before 37 weeks, healthcare providers may want to monitor your condition if your BP is not severely high  An ultrasound may be done every 3 to 4 weeks to check your baby's growth  The amount of amniotic fluid may be measured every week  Your provider will tell you how often to come in for tests  Treatment may include any of the following:  · Medicine  may be given to lower your BP or prevent seizures  The dose of current BP medicine you take may need to be changed  Daily low-dose aspirin may be recommended if you are at high risk for preeclampsia  Aspirin may help prevent preeclampsia or problems that it can cause  Your healthcare provider will tell you if you should take aspirin, and when to start  It is usually recommended from week 12 of pregnancy until delivery  Do not take aspirin unless directed by your healthcare provider  · Ultrasounds  are used to check your baby's growth and make sure he or she is healthy  · Delivery  may be recommended  Delivery may stop high BP or problems such as preeclampsia  Healthcare providers may deliver your baby right away if he or she is full-term (37 weeks or more)  You may need to deliver your baby early if you or the baby has life-threatening symptoms  Manage hypertension during pregnancy:  Your healthcare providers will tell you what BP is best for you during your pregnancy  They will help you create a plan to lower your BP safely and keep it at recommended levels  This is based on the kind of hypertension you have and how high your BP is  Sometimes it is difficult to diagnose a specific kind of hypertension, but you can still follow general guidelines:  · Go to all scheduled appointments  Your healthcare providers will check your blood pressure and may order other tests  · Rest as directed    Your healthcare provider may tell you to rest more often if you have mild symptoms of preeclampsia  · Check your BP as directed if you have chronic hypertension  Sit and rest for 5 minutes before you take your BP  Extend your arm and support it on a flat surface  Your arm should be at the same level as your heart  Follow the directions that came with your BP monitor  Take your BP as often as directed  Keep a record of your BP readings and bring it to your follow-up visits  · Do not drink alcohol or smoke  Alcohol, nicotine, and other chemicals in cigarettes and cigars can increase your BP  They can also harm your baby  Ask your healthcare provider for information if you currently drink alcohol or smoke and need help to quit  E-cigarettes or smokeless tobacco still contain nicotine  Talk to your healthcare provider before you use these products  · Eat healthy foods  Healthy foods can help control your BP  Healthy foods include fruits, vegetables, whole-grain breads, low-fat dairy products, beans, lean meats, and fish  Ask if you need to be on a special diet  · Exercise if directed  Exercise can help lower your BP  Ask your healthcare provider how much exercise you need and which exercise is right for you  · Do kick counts as directed  You may need to keep track of how often your baby moves or kicks over a certain amount of time  Ask your obstetrician how to do kick counts and how often to do them  · Check your weight each day  Weigh yourself every day before breakfast  Weight gain can be a sign of extra fluid in your body  Follow up with your doctor or obstetrician as directed:  Write down your questions so you remember to ask them during your visits  © Copyright [employer] 2022 Information is for End User's use only and may not be sold, redistributed or otherwise used for commercial purposes   All illustrations and images included in CareNotes® are the copyrighted property of [employer]  or [employer] [facility]  The above information is an  only  It is not intended as medical advice for individual conditions or treatments  Talk to your doctor, nurse or pharmacist before following any medical regimen to see if it is safe and effective for you

## 2022-03-18 NOTE — PROCEDURES
Lelo Thomson, a [id number] at 36w4d with an SHARON of 4/11/2022, by Ultrasound, was seen at [address] [place] [address],[address] for the following procedure(s):  ]      NST    Baseline 140  Variability moderate  accels yes  decels no  Reactive- yes  Movement- yes    KENNETH  2 8  3 2  3 1  1 8  Total 10 6    Has IOL scheduled in 3 days

## 2022-03-21 ENCOUNTER — ANESTHESIA EVENT (INPATIENT)
Dept: ANESTHESIOLOGY | Facility: HOSPITAL | Age: 37
End: 2022-03-21
Payer: COMMERCIAL

## 2022-03-21 ENCOUNTER — HOSPITAL ENCOUNTER (OUTPATIENT)
Dept: LABOR AND DELIVERY | Facility: HOSPITAL | Age: 37
Discharge: HOME | End: 2022-03-21
Payer: COMMERCIAL

## 2022-03-21 ENCOUNTER — ANESTHESIA (INPATIENT)
Dept: ANESTHESIOLOGY | Facility: HOSPITAL | Age: 37
End: 2022-03-21
Payer: COMMERCIAL

## 2022-03-21 ENCOUNTER — HOSPITAL ENCOUNTER (INPATIENT)
Facility: HOSPITAL | Age: 37
LOS: 2 days | Discharge: HOME | End: 2022-03-23
Attending: OBSTETRICS & GYNECOLOGY | Admitting: OBSTETRICS & GYNECOLOGY
Payer: COMMERCIAL

## 2022-03-21 DIAGNOSIS — D50.8 IRON DEFICIENCY ANEMIA SECONDARY TO INADEQUATE DIETARY IRON INTAKE: ICD-10-CM

## 2022-03-21 DIAGNOSIS — O13.3 GESTATIONAL HYPERTENSION, THIRD TRIMESTER: ICD-10-CM

## 2022-03-21 DIAGNOSIS — Z3A.37 37 WEEKS GESTATION OF PREGNANCY: Primary | ICD-10-CM

## 2022-03-21 LAB
ABO GROUP BLD: NORMAL
ALBUMIN SERPL BCP-MCNC: 2.4 G/DL (ref 3.5–5)
ALP SERPL-CCNC: 154 U/L (ref 46–116)
ALT SERPL W P-5'-P-CCNC: 14 U/L (ref 12–78)
AMPHETAMINES SERPL QL SCN: NEGATIVE
ANION GAP SERPL CALCULATED.3IONS-SCNC: 10 MMOL/L (ref 4–13)
AST SERPL W P-5'-P-CCNC: 11 U/L (ref 5–45)
BARBITURATES UR QL: NEGATIVE
BASE EXCESS BLDCOA CALC-SCNC: -3.2 MMOL/L (ref 3–11)
BASE EXCESS BLDCOV CALC-SCNC: -3.2 MMOL/L (ref 1–9)
BENZODIAZ UR QL: NEGATIVE
BILIRUB SERPL-MCNC: 0.3 MG/DL (ref 0.2–1)
BLD GP AB SCN SERPL QL: NEGATIVE
BUN SERPL-MCNC: 8 MG/DL (ref 5–25)
CALCIUM ALBUM COR SERPL-MCNC: 9.6 MG/DL (ref 8.3–10.1)
CALCIUM SERPL-MCNC: 8.3 MG/DL (ref 8.3–10.1)
CHLORIDE SERPL-SCNC: 104 MMOL/L (ref 100–108)
CO2 SERPL-SCNC: 22 MMOL/L (ref 21–32)
COCAINE UR QL: NEGATIVE
CREAT SERPL-MCNC: 0.54 MG/DL (ref 0.6–1.3)
CREAT UR-MCNC: 131 MG/DL
ERYTHROCYTE [DISTWIDTH] IN BLOOD BY AUTOMATED COUNT: 12.8 % (ref 11.6–15.1)
GFR SERPL CREATININE-BSD FRML MDRD: 120 ML/MIN/1.73SQ M
GLUCOSE SERPL-MCNC: 87 MG/DL (ref 65–140)
GLUCOSE SERPL-MCNC: 90 MG/DL (ref 65–140)
HCO3 BLDCOA-SCNC: 22.6 MMOL/L (ref 17.3–27.3)
HCO3 BLDCOV-SCNC: 21.5 MMOL/L (ref 12.2–28.6)
HCT VFR BLD AUTO: 33.4 % (ref 34.8–46.1)
HGB BLD-MCNC: 11.3 G/DL (ref 11.5–15.4)
MCH RBC QN AUTO: 29.8 PG (ref 26.8–34.3)
MCHC RBC AUTO-ENTMCNC: 33.8 G/DL (ref 31.4–37.4)
MCV RBC AUTO: 88 FL (ref 82–98)
METHADONE UR QL: NEGATIVE
O2 CT VFR BLDCOA CALC: 5.7 ML/DL
OPIATES UR QL SCN: NEGATIVE
OXYCODONE+OXYMORPHONE UR QL SCN: NEGATIVE
OXYHGB MFR BLDCOA: 27.6 %
OXYHGB MFR BLDCOV: 29.4 %
PCO2 BLDCOA: 43.2 MM[HG] (ref 30–60)
PCO2 BLDCOV: 37.5 MM HG (ref 27–43)
PCP UR QL: NEGATIVE
PH BLDCOA: 7.34 [PH] (ref 7.23–7.43)
PH BLDCOV: 7.38 [PH] (ref 7.19–7.49)
PLATELET # BLD AUTO: 222 THOUSANDS/UL (ref 149–390)
PMV BLD AUTO: 11.2 FL (ref 8.9–12.7)
PO2 BLDCOA: 15 MM HG (ref 5–25)
PO2 BLDCOV: 15.1 MM HG (ref 15–45)
POTASSIUM SERPL-SCNC: 3.9 MMOL/L (ref 3.5–5.3)
PROT SERPL-MCNC: 6.3 G/DL (ref 6.4–8.2)
PROT UR-MCNC: 26 MG/DL
PROT/CREAT UR: 0.2 MG/G{CREAT} (ref 0–0.1)
RBC # BLD AUTO: 3.79 MILLION/UL (ref 3.81–5.12)
RH BLD: POSITIVE
RPR SER QL: NORMAL
SAO2 % BLDCOV: 5.6 ML/DL
SODIUM SERPL-SCNC: 136 MMOL/L (ref 136–145)
SPECIMEN EXPIRATION DATE: NORMAL
THC UR QL: NEGATIVE
WBC # BLD AUTO: 10.49 THOUSAND/UL (ref 4.31–10.16)

## 2022-03-21 PROCEDURE — 4A1HXCZ MONITORING OF PRODUCTS OF CONCEPTION, CARDIAC RATE, EXTERNAL APPROACH: ICD-10-PCS | Performed by: OBSTETRICS & GYNECOLOGY

## 2022-03-21 PROCEDURE — 85027 COMPLETE CBC AUTOMATED: CPT | Performed by: OBSTETRICS & GYNECOLOGY

## 2022-03-21 PROCEDURE — 86901 BLOOD TYPING SEROLOGIC RH(D): CPT | Performed by: OBSTETRICS & GYNECOLOGY

## 2022-03-21 PROCEDURE — 84156 ASSAY OF PROTEIN URINE: CPT | Performed by: OBSTETRICS & GYNECOLOGY

## 2022-03-21 PROCEDURE — 88307 TISSUE EXAM BY PATHOLOGIST: CPT | Performed by: PATHOLOGY

## 2022-03-21 PROCEDURE — 82570 ASSAY OF URINE CREATININE: CPT | Performed by: OBSTETRICS & GYNECOLOGY

## 2022-03-21 PROCEDURE — 86850 RBC ANTIBODY SCREEN: CPT | Performed by: OBSTETRICS & GYNECOLOGY

## 2022-03-21 PROCEDURE — NC001 PR NO CHARGE: Performed by: OBSTETRICS & GYNECOLOGY

## 2022-03-21 PROCEDURE — 59400 OBSTETRICAL CARE: CPT | Performed by: OBSTETRICS & GYNECOLOGY

## 2022-03-21 PROCEDURE — 82948 REAGENT STRIP/BLOOD GLUCOSE: CPT

## 2022-03-21 PROCEDURE — 3E033VJ INTRODUCTION OF OTHER HORMONE INTO PERIPHERAL VEIN, PERCUTANEOUS APPROACH: ICD-10-PCS | Performed by: OBSTETRICS & GYNECOLOGY

## 2022-03-21 PROCEDURE — 86900 BLOOD TYPING SEROLOGIC ABO: CPT | Performed by: OBSTETRICS & GYNECOLOGY

## 2022-03-21 PROCEDURE — 80307 DRUG TEST PRSMV CHEM ANLYZR: CPT | Performed by: OBSTETRICS & GYNECOLOGY

## 2022-03-21 PROCEDURE — 82805 BLOOD GASES W/O2 SATURATION: CPT | Performed by: OBSTETRICS & GYNECOLOGY

## 2022-03-21 PROCEDURE — 86592 SYPHILIS TEST NON-TREP QUAL: CPT | Performed by: OBSTETRICS & GYNECOLOGY

## 2022-03-21 PROCEDURE — 80053 COMPREHEN METABOLIC PANEL: CPT | Performed by: OBSTETRICS & GYNECOLOGY

## 2022-03-21 RX ORDER — OXYTOCIN/RINGER'S LACTATE 30/500 ML
250 PLASTIC BAG, INJECTION (ML) INTRAVENOUS
Status: DISPENSED | OUTPATIENT
Start: 2022-03-21 | End: 2022-03-21

## 2022-03-21 RX ORDER — SODIUM CHLORIDE, SODIUM LACTATE, POTASSIUM CHLORIDE, CALCIUM CHLORIDE 600; 310; 30; 20 MG/100ML; MG/100ML; MG/100ML; MG/100ML
125 INJECTION, SOLUTION INTRAVENOUS CONTINUOUS
Status: DISCONTINUED | OUTPATIENT
Start: 2022-03-21 | End: 2022-03-21

## 2022-03-21 RX ORDER — SIMETHICONE 80 MG
80 TABLET,CHEWABLE ORAL 4 TIMES DAILY PRN
Status: DISCONTINUED | OUTPATIENT
Start: 2022-03-21 | End: 2022-03-23 | Stop reason: HOSPADM

## 2022-03-21 RX ORDER — PANTOPRAZOLE SODIUM 20 MG/1
20 TABLET, DELAYED RELEASE ORAL
Status: DISCONTINUED | OUTPATIENT
Start: 2022-03-21 | End: 2022-03-23 | Stop reason: HOSPADM

## 2022-03-21 RX ORDER — ACETAMINOPHEN 325 MG/1
650 TABLET ORAL EVERY 4 HOURS PRN
Status: DISCONTINUED | OUTPATIENT
Start: 2022-03-21 | End: 2022-03-23 | Stop reason: HOSPADM

## 2022-03-21 RX ORDER — IBUPROFEN 600 MG/1
600 TABLET ORAL EVERY 6 HOURS
Status: DISCONTINUED | OUTPATIENT
Start: 2022-03-21 | End: 2022-03-23 | Stop reason: HOSPADM

## 2022-03-21 RX ORDER — EPHEDRINE SULFATE 50 MG/ML
INJECTION INTRAVENOUS AS NEEDED
Status: DISCONTINUED | OUTPATIENT
Start: 2022-03-21 | End: 2022-03-22 | Stop reason: HOSPADM

## 2022-03-21 RX ORDER — ONDANSETRON 2 MG/ML
4 INJECTION INTRAMUSCULAR; INTRAVENOUS EVERY 8 HOURS PRN
Status: DISCONTINUED | OUTPATIENT
Start: 2022-03-21 | End: 2022-03-23 | Stop reason: HOSPADM

## 2022-03-21 RX ORDER — LIDOCAINE HYDROCHLORIDE 10 MG/ML
INJECTION, SOLUTION EPIDURAL; INFILTRATION; INTRACAUDAL; PERINEURAL AS NEEDED
Status: DISCONTINUED | OUTPATIENT
Start: 2022-03-21 | End: 2022-03-22 | Stop reason: HOSPADM

## 2022-03-21 RX ORDER — LEVOTHYROXINE SODIUM 0.03 MG/1
75 TABLET ORAL DAILY
Status: DISCONTINUED | OUTPATIENT
Start: 2022-03-21 | End: 2022-03-23 | Stop reason: HOSPADM

## 2022-03-21 RX ORDER — DIAPER,BRIEF,INFANT-TODD,DISP
1 EACH MISCELLANEOUS DAILY PRN
Status: DISCONTINUED | OUTPATIENT
Start: 2022-03-21 | End: 2022-03-23 | Stop reason: HOSPADM

## 2022-03-21 RX ORDER — ONDANSETRON 2 MG/ML
4 INJECTION INTRAMUSCULAR; INTRAVENOUS EVERY 6 HOURS PRN
Status: DISCONTINUED | OUTPATIENT
Start: 2022-03-21 | End: 2022-03-21

## 2022-03-21 RX ORDER — OXYTOCIN/RINGER'S LACTATE 30/500 ML
1-30 PLASTIC BAG, INJECTION (ML) INTRAVENOUS
Status: DISCONTINUED | OUTPATIENT
Start: 2022-03-21 | End: 2022-03-21

## 2022-03-21 RX ORDER — LIDOCAINE HYDROCHLORIDE AND EPINEPHRINE 15; 5 MG/ML; UG/ML
INJECTION, SOLUTION EPIDURAL AS NEEDED
Status: DISCONTINUED | OUTPATIENT
Start: 2022-03-21 | End: 2022-03-22 | Stop reason: HOSPADM

## 2022-03-21 RX ORDER — DOCUSATE SODIUM 100 MG/1
100 CAPSULE, LIQUID FILLED ORAL 2 TIMES DAILY
Status: DISCONTINUED | OUTPATIENT
Start: 2022-03-21 | End: 2022-03-23 | Stop reason: HOSPADM

## 2022-03-21 RX ADMIN — IBUPROFEN 600 MG: 600 TABLET ORAL at 19:30

## 2022-03-21 RX ADMIN — LEVOTHYROXINE SODIUM 75 MCG: 25 TABLET ORAL at 10:46

## 2022-03-21 RX ADMIN — Medication 2 MILLI-UNITS/MIN: at 09:46

## 2022-03-21 RX ADMIN — WITCH HAZEL 1 PAD: 500 SOLUTION RECTAL; TOPICAL at 20:12

## 2022-03-21 RX ADMIN — ONDANSETRON 4 MG: 2 INJECTION INTRAMUSCULAR; INTRAVENOUS at 13:26

## 2022-03-21 RX ADMIN — ROPIVACAINE HYDROCHLORIDE: 2 INJECTION, SOLUTION EPIDURAL; INFILTRATION at 12:45

## 2022-03-21 RX ADMIN — SODIUM CHLORIDE, SODIUM LACTATE, POTASSIUM CHLORIDE, AND CALCIUM CHLORIDE 125 ML/HR: .6; .31; .03; .02 INJECTION, SOLUTION INTRAVENOUS at 13:25

## 2022-03-21 RX ADMIN — SODIUM CHLORIDE, SODIUM LACTATE, POTASSIUM CHLORIDE, AND CALCIUM CHLORIDE 1000 ML: .6; .31; .03; .02 INJECTION, SOLUTION INTRAVENOUS at 09:43

## 2022-03-21 RX ADMIN — LIDOCAINE HYDROCHLORIDE 3 ML: 10 INJECTION, SOLUTION EPIDURAL; INFILTRATION; INTRACAUDAL; PERINEURAL at 12:32

## 2022-03-21 RX ADMIN — Medication 250 MILLI-UNITS/MIN: at 19:46

## 2022-03-21 RX ADMIN — BENZOCAINE AND LEVOMENTHOL 1 APPLICATION: 200; 5 SPRAY TOPICAL at 20:12

## 2022-03-21 RX ADMIN — PANTOPRAZOLE SODIUM 20 MG: 20 TABLET, DELAYED RELEASE ORAL at 10:50

## 2022-03-21 RX ADMIN — LIDOCAINE HYDROCHLORIDE AND EPINEPHRINE 5 ML: 15; 5 INJECTION, SOLUTION EPIDURAL at 12:27

## 2022-03-21 RX ADMIN — SODIUM CHLORIDE, SODIUM LACTATE, POTASSIUM CHLORIDE, AND CALCIUM CHLORIDE 125 ML/HR: .6; .31; .03; .02 INJECTION, SOLUTION INTRAVENOUS at 10:40

## 2022-03-21 RX ADMIN — EPHEDRINE SULFATE 15 MG: 50 INJECTION, SOLUTION INTRAVENOUS at 13:16

## 2022-03-21 RX ADMIN — ACETAMINOPHEN 650 MG: 325 TABLET, FILM COATED ORAL at 21:23

## 2022-03-21 NOTE — ANESTHESIA PREPROCEDURE EVALUATION
Procedure:  LABOR ANALGESIA    Relevant Problems   CARDIO   (+) Bicuspid aortic valve   (+) Gestational hypertension      ENDO   (+) Subclinical hypothyroidism      GYN   (+) 36 weeks gestation of pregnancy   (+) 37 weeks gestation of pregnancy   (+) High risk pregnancy, antepartum      HEMATOLOGY   (+) Iron deficiency anemia secondary to inadequate dietary iron intake      NEURO/PSYCH   (+) Depression   (+) History of DVT (deep vein thrombosis)   (+) Personal history of COVID-19      Other   (+) COVID-19 affecting pregnancy in third trimester (12/31 hospitalised  Needed Nasal O2  Back to normal in Late Jan 202)   (+) Thoracic outlet syndrome   12/13/21  Left Ventricle Left ventricular cavity size is normal  Wall thickness is normal  The left ventricular ejection fraction is 65%  Systolic function is normal   Wall motion is normal  Diastolic function is normal    Right Ventricle Right ventricular cavity size is normal  Systolic function is normal  Wall thickness is normal    Left Atrium The atrium is normal in size  Right Atrium The atrium is normal in size  Aortic Valve The aortic valve is bicuspid  The leaflets are not thickened  The leaflets are not calcified  The leaflets exhibit normal mobility  There is no evidence of regurgitation  There is no evidence of stenosis  Mitral Valve The mitral valve has normal structure and function  There is mild regurgitation  There is no evidence of stenosis  Tricuspid Valve Tricuspid valve structure is normal  There is trace regurgitation  There is no evidence of stenosis  Pulmonic Valve Pulmonic valve structure is normal  There is no evidence of regurgitation  There is no evidence of stenosis  Ascending Aorta The aortic root is normal in size         Lab Results   Component Value Date    GLUC 90 03/21/2022    ALT 14 03/21/2022    AST 11 03/21/2022    BUN 8 03/21/2022    CALCIUM 8 3 03/21/2022     03/21/2022    CO2 22 03/21/2022    CREATININE 0 54 (L) 03/21/2022    INR 1 04 12/12/2021    HCT 33 4 (L) 03/21/2022    HGB 11 3 (L) 03/21/2022    MG 1 7 12/12/2021     03/21/2022    K 3 9 03/21/2022    WBC 10 49 (H) 03/21/2022     Epidural discussed with patient  Side effects, including post dural puncture headache discussed  All questions answered  Consent given by patient  Physical Exam    Airway    Mallampati score: I  TM Distance: >3 FB       Dental   No notable dental hx     Cardiovascular      Pulmonary  Breath sounds clear to auscultation,     Other Findings        Anesthesia Plan  ASA Score- 3     Anesthesia Type- epidural with ASA Monitors  Additional Monitors:   Airway Plan:           Plan Factors-Exercise tolerance (METS): >4 METS  Chart reviewed  Imaging results reviewed  Existing labs reviewed  Patient summary reviewed  Patient is not a current smoker  Patient not instructed to abstain from smoking on day of procedure  Patient did not smoke on day of surgery  Induction-     Postoperative Plan-     Informed Consent- Anesthetic plan and risks discussed with patient  I personally reviewed this patient with the CRNA  Discussed and agreed on the Anesthesia Plan with the CRNA  Krystyna Blas

## 2022-03-21 NOTE — OB LABOR/OXYTOCIN SAFETY PROGRESS
Labor Progress Note - Hadley Thomson 39 y o  female MRN: [id number]    Unit/Bed#: -01 Encounter: [id number]    Dose (jennifer-units/min) Oxytocin: 22 jennifer-units/min  Contraction Frequency (minutes): 2 5  Contraction Quality: Mild  Tachysystole: No   Cervical Dilation: 9        Cervical Effacement: 70  Fetal Station: -1  Baseline Rate: 150 bpm  Fetal Heart Rate: 150 BPM  FHR Category: Category II               Vital Signs:   Vitals:    03/21/22 1600   BP: 118/72   Pulse: 83   Resp:    Temp:    SpO2:        Notes/comments:   Single spontaneous deceleration to 90 lasting 1 minute  Resolved with position change to left side   Good recovery of FHT and is now category I  Cervix unchanged from recent exam     Geneva Zavala MD 3/21/2022 4:20 PM

## 2022-03-21 NOTE — OB LABOR/OXYTOCIN SAFETY PROGRESS
Labor Progress Note - Zeinab Thomson 39 y o  female MRN: [id number]    Unit/Bed#: -01 Encounter: [id number]    Dose (jennifer-units/min) Oxytocin: 28 jennifer-units/min  Contraction Frequency (minutes): 2-4 5  Contraction Quality: Mild  Tachysystole: No   Cervical Dilation: Lip/rim (Comment)        Cervical Effacement: 80  Fetal Station: 0  Baseline Rate: 165 bpm  Fetal Heart Rate: 150 BPM  FHR Category: Category II               Vital Signs:   Vitals:    03/21/22 1715   BP: 131/82   Pulse: 83   Resp:    Temp:    SpO2:        Notes/comments: Feeling pressure and becoming more uncomfortable with descent of fetal head  Will empty bladder and suspect she will be able to start pushing soon          Estephania Corea MD 3/21/2022 5:33 PM

## 2022-03-21 NOTE — H&P
H&P Exam - Obstetrics   Marie Thomson 39 y o  female MRN: [id number]  Unit/Bed#: -01 Encounter: [id number]    Assessment/Plan     A/P  96TR [id number] at 37 0 weeks gestational age, with GHTN  (1) GHTN  Elevated BP 3 and 3/15  No h/o CHTN  No symptoms of severe preeclampsia  Labs without evidence of end-organ damage  P/C ratio 0 21 most recently  DIagnosis reviewed with her  Induction discussed with her as outpatient, and she has agreed with this plan  She reaffirms her decision today  Today, I reviewed with her the R/B/A to IoL, risks including but not limited to the risk of uterine rupture/hysterectomy/death, fetal stress and injury, as well as the risk of a prolonged induction ending in  delivery  Following our discussion of R/B/A, all of her questions were answered and she stated her strong desire to proceed  --> Admit  [place]/IVF/labs to evaluate for the presence of end-organ damage  --> Induction of labor  --> Follow BP/Sx  (2) Induction  Parous, with adequate pelvis  Cephalic, EFW [id number]  Cervix 4, medium/posterior  Could benefit from additional ripening  Infrequent UCs   --> [place]/oxytocin  (3) Bicuspid aortic valve  Had echo 21 which was essentially normal except for the bicuspid valve  No aortic regurgitation or stenosis    Left Ventricle: Left ventricular cavity size is normal  The left ventricular ejection fraction is 65%  Systolic function is normal  Wall motion is normal  Diastolic function is normal     Right Ventricle: Right ventricular cavity size is normal  Systolic function is normal     Aortic Valve: The aortic valve is bicuspid  The leaflets are not thickened  The leaflets are not calcified  The leaflets exhibit normal mobility    Mitral Valve: There is mild regurgitation  Has been seeing cardiologist, and there is no recommendation to alter delivery plan  Had normal fetal echo 22   --> Routine L&D management    --> Follow symptoms  (4) Hypothyroid  --> Continue outpatient regimen of levothyroxine 75mcg qDay  (5) History of DVT  In  had UE DVT thought to be due to thoracic outlet syndrome  Had repair, including removal of first rib  Had normal thrombophilia evaluation  Has not been on anticoagulation during pregnancy  --> Routine thromboprophylaxis  (6) Elevated 1hrPG  Never did 3hrGTT   --> Accucheck on admission  (7) Fetal status category I   --> Continuous fetal Monitoring  Lesly Portillo MD    CC/Scheduled induction of labor  HPI/Diagnosed GHTN, scheduled today at 37 weeks  this diagnosis  Feels well   (+)FM, irregular UCs, no VB/LoF  No HA/VF changes/RUQ pain  Allergies/    Morphine and dilaudid (itching)    Venlafaxine    Gadobutrol    Shellfish    MRI and other IV contrast    Nuts    PMH/    Hypothyroid    Bicuspid aortic valve     Upper extremity DVT    PSH/     Removal of right 1st rib after DVT    ObHx/    2004 TAb     FTSVD 7#1 boy    GynHx/    Menarche at 11    There is no history of STI    FH/    Bicuspid aortic valve, otherwise, no birth defects  SH/    She is engaged to be   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 [employer]  She does not use tobacco, alcohol, or illicit drugs      RoS/    Constitutional: Negative    CV: Negative    Pulm: Negative    GI: Negative    Urinary: Negative    Neuro: Negative    Musculoskeletal: Negative    Historical Information   OB History    Para Term  AB Living   3 1 1 0 1 1   SAB IAB Ectopic Multiple Live Births     1     1      # Outcome Date GA Lbr Cuong/2nd Weight Sex Delivery Anes PTL Lv   3 Current            2 Term 12 38w0d  3430 g (7 lb 9 oz)  Vag-Spont EPI N OLIVIA      Birth Comments: gestational HTN   1 IAB 04     TAB        Past Medical History:   Diagnosis Date    Acid reflux     Bicuspid aortic valve     COVID-19 affecting pregnancy in second trimester 2021    Depression  in 2004 after termination of pregnancy  was tx with zoloft     Diabetes mellitus ([address] )     prediabetic    Gestational hypertension 2011    third trimester with her 1st baby  del @ 38 weeks    High-risk pregnancy     HX DVT (deep venous thrombosis) (HCC)     in right axilla -- complication from thoracic outlet syndrome    PCOS (polycystic ovarian syndrome) 2010    initally diagnosed in 2010 tx with metformin  pt d/c med and then restarted in 2016    Pituitary microadenoma ([address] ) 2009    last MRI in 2009    Prediabetes 2016    'impaired fasting glucose'     Thoracic outlet syndrome 2004    in 2004 per [facility] records; has surgical repair     Past Surgical History:   Procedure Laterality Date    BONE RESECTION, RIB Right 2004     Social History   Social History     Substance and Sexual Activity   Alcohol Use Never     Social History     Substance and Sexual Activity   Drug Use Never     Social History     Tobacco Use   Smoking Status Never Smoker   Smokeless Tobacco Never Used     E-Cigarette/Vaping    E-Cigarette Use Never User      E-Cigarette/Vaping Substances    Nicotine No     THC No     CBD Yes before pregnancy for anxiety    Flavoring No      Allergies   Allergen Reactions    Gadobutrol Cough and Itching    Iv Contrast [Iodinated Diagnostic Agents] Shortness Of Breath and Throat Swelling     Pt stated this was from 'years and years' ago and has not had it since    Nuts - Food Allergy Anaphylaxis    Shellfish-Derived Products - Food Allergy Anaphylaxis    Shrimp Extract Allergy Skin Test - Food Allergy Headache    Venlafaxine Anaphylaxis     Per [facility] records    Hydromorphone Itching     During labor had reaction    Morphine Itching    Peanut Oil - Food Allergy Other (See Comments)     Itching, trouble breathing       Objective   Vitals: Last menstrual period 06/25/2021, unknown if currently breastfeeding  There is no height or weight on file to calculate BMI      Invasive Devices Report    Peripheral Intravenous Line            Peripheral IV 12/13/21 Left Antecubital 98 days              O/127/85  Alert comfortable NAD  RRR  CTA(B)  Soft NT ND  Fundus NT size c/w dates, EFW 3200grams  Ext NT warm  Pelvis adequate/gynecoid  Cervix 2/20/-4, medium/posterior    FHR: 155 moderate variability (+)accels   Reactive  Bunn: One contraction    Ultrasound: Cephalic    Prenatal labs:    8/17/21  A+/-  RPR Negative  HBSAg Negative  HIV Negative  Rubella Immune  GC/CT Negative/Negative    2/10/22  1hrPG 138  RPR Negative  Hgb/Hct 10 8/32 5    3/12/22  GBS Negative    3/15/22    Hgb/Hct 11 5/35 2    Platelets 055    BUN/Cr 8/0 59    AST/ALT 15/13    P/C Ratio 16/75 = 0 21

## 2022-03-21 NOTE — ANESTHESIA PROCEDURE NOTES
Epidural Block    Patient location during procedure: OB  Start time: 3/21/2022 12:10 PM  Reason for block: procedure for pain and at surgeon's request  Staffing  Performed: Anesthesiologist   Anesthesiologist: Michael Hebert MD  Preanesthetic Checklist  Completed: patient identified, IV checked, risks and benefits discussed, surgical consent, monitors and equipment checked, pre-op evaluation and timeout performed  Epidural  Patient position: sitting  Prep: Betadine and site prepped and draped  Patient monitoring: heart rate, continuous pulse ox and frequent blood pressure checks  Approach: midline  Location: lumbar  Injection technique: JEMMA air  Needle  Needle type: Tuohy   Needle gauge: 17 G  Catheter type: stylet  Catheter size: 19 G  Catheter at skin depth: 11 cm  Catheter securement method: stabilization device  Test dose: negative  Assessment  Number of attempts: 1negative aspiration for CSF, negative aspiration for heme and no paresthesia on injection  patient tolerated the procedure well with no immediate complications  Additional Notes  Epidural X 1 attempt w/o incident

## 2022-03-21 NOTE — L&D DELIVERY NOTE
Patient was found to be completely dilated and +2 station  She pushed for 1 minute to spontaneously deliver a liveborn female infant in OA position through clear fluid at [phone], weight pending, APGARS 8 and 9 at 1 and 5 minutes, respectively  The head and shoulders delivered easily, with the body easily delivering thereafter  The infant was placed on maternal abdomen  Cord clamping was delayed for 30 seconds, after which the cord was doubly clamped and cut  Cord gases were collected  Cord blood was collected  Pitocin was started for active management of the 3rd stage  Placenta delivered spontaneously thereafter  Bimanual exam was performed, and the fundus was noted to be firm  No laceration was noted  Excellent hemostasis was noted, with total EBL of 50 mL       Estephania Corea MD  3/21/2022 6:05 PM

## 2022-03-21 NOTE — OB LABOR/OXYTOCIN SAFETY PROGRESS
Labor Progress Note - Karlie Thomson 39 y o  female MRN: [id number]    Unit/Bed#: -01 Encounter: [id number]    Dose (jennifer-units/min) Oxytocin: 22 jennifer-units/min  Contraction Frequency (minutes): 2-3  Contraction Quality: Mild  Tachysystole: No   Cervical Dilation: 9        Cervical Effacement: 70  Fetal Station: -1  Baseline Rate: 150 bpm  Fetal Heart Rate: 150 BPM  FHR Category: Category I               Vital Signs:   Vitals:    03/21/22 1530   BP: 107/59   Pulse: 82   Resp:    Temp:    SpO2:        Notes/comments: Feeling some pressure with contractions  Making good progress   SAVD expected      Ary Stack MD 3/21/2022 4:00 PM

## 2022-03-21 NOTE — ANESTHESIA POSTPROCEDURE EVALUATION
Post-Op Assessment Note    CV Status:  Stable  Pain Score: 0    Pain management: adequate     Mental Status:  Alert and awake   Hydration Status:  Stable   PONV Controlled:  Controlled   Airway Patency:  Patent      Post Op Vitals Reviewed: Yes      Staff: CRNA   Comments: Epidural catheter removed without difficulty, tip intact, site clean    Post-op block assessment: catheter intact and no complications      No complications documented      /86 (03/21/22 1931)    Temp 97 8 °F (36 6 °C) (03/21/22 1931)    Pulse 88 (03/21/22 1931)   Resp 18 (03/21/22 1931)    SpO2 99 % (03/21/22 1931)

## 2022-03-21 NOTE — OB LABOR/OXYTOCIN SAFETY PROGRESS
Labor Progress Note - Berta Thomson 39 y o  female MRN: [id number]    Unit/Bed#: -01 Encounter: [id number]    Dose (jennifer-units/min) Oxytocin: 12 jennifer-units/min  Contraction Frequency (minutes): 3-4  Contraction Quality: Moderate  Tachysystole: No   Cervical Dilation: 7        Cervical Effacement: 60  Fetal Station: -1  Baseline Rate: 150 bpm  Fetal Heart Rate: 150 BPM  FHR Category: Category I               Vital Signs:   Vitals:    03/21/22 1336   BP:    Pulse: 93   Resp:    Temp:    SpO2:        Notes/comments: Comfortable with epidural  Pt had an episode of hypotension post placement, managed by anesthesia  Now feeling better  AROM for clear fluid  Good progress   Favoriteshashi Smith MD 3/21/2022 1:44 PM

## 2022-03-21 NOTE — PLAN OF CARE
Problem: PAIN - ADULT  Goal: Verbalizes/displays adequate comfort level or baseline comfort level  Description: Interventions:  - Encourage patient to monitor pain and request assistance  - Assess pain using appropriate pain scale  - Administer analgesics based on type and severity of pain and evaluate response  - Implement non-pharmacological measures as appropriate and evaluate response  - Consider cultural and social influences on pain and pain management  - Notify physician/advanced practitioner if interventions unsuccessful or patient reports new pain  3/21/2022 1842 by Woody Sahni RN  Outcome: Progressing  3/21/2022 1841 by Woody Sahni RN  Outcome: Progressing     Problem: INFECTION - ADULT  Goal: Absence or prevention of progression during hospitalization  Description: INTERVENTIONS:  - Assess and monitor for signs and symptoms of infection  - Monitor lab/diagnostic results  - Monitor all insertion sites, i e  indwelling lines, tubes, and drains  - Monitor endotracheal if appropriate and nasal secretions for changes in amount and color  - [place] appropriate cooling/warming therapies per order  - Administer medications as ordered  - Instruct and encourage patient and family to use good hand hygiene technique  - Identify and instruct in appropriate isolation precautions for identified infection/condition  3/21/2022 1842 by oWody Sahni RN  Outcome: Progressing  3/21/2022 1841 by Woody Sahni RN  Outcome: Progressing  Goal: Absence of fever/infection during neutropenic period  Description: INTERVENTIONS:  - Monitor WBC    3/21/2022 1842 by Woody Sahni RN  Outcome: Progressing  3/21/2022 1841 by Woody Sahni RN  Outcome: Progressing     Problem: SAFETY ADULT  Goal: Patient will remain free of falls  Description: INTERVENTIONS:  - Educate patient/family on patient safety including physical limitations  - Instruct patient to call for assistance with activity   - Consult OT/PT to assist with strengthening/mobility   - Keep Call bell within reach  - Keep bed low and locked with side rails adjusted as appropriate  - Keep care items and personal belongings within reach  - Initiate and maintain comfort rounds  - Make Fall Risk Sign visible to staff    - Apply yellow socks and bracelet for high fall risk patients  - Consider moving patient to room near nurses station  3/21/2022 1842 by Laura Peraza RN  Outcome: Progressing  3/21/2022 1841 by Laura Peraza RN  Outcome: Progressing  Goal: Maintain or return to baseline ADL function  Description: INTERVENTIONS:  -  Assess patient's ability to carry out ADLs; assess patient's baseline for ADL function and identify physical deficits which impact ability to perform ADLs (bathing, care of mouth/teeth, toileting, grooming, dressing, etc )  - Assess/evaluate cause of self-care deficits   - Assess range of motion  - Assess patient's mobility; develop plan if impaired  - Assess patient's need for assistive devices and provide as appropriate  - Encourage maximum independence but intervene and supervise when necessary  - Involve family in performance of ADLs  - Assess for home care needs following discharge   - Consider OT consult to assist with ADL evaluation and planning for discharge  - Provide patient education as appropriate  3/21/2022 1842 by Laura Peraza RN  Outcome: Progressing  3/21/2022 1841 by Laura Peraza RN  Outcome: Progressing  Goal: Maintains/Returns to pre admission functional level  Description: INTERVENTIONS:  - Perform BMAT or MOVE assessment daily    - Set and communicate daily mobility goal to care team and patient/family/caregiver     - Collaborate with rehabilitation services on mobility goals if consulted    - Out of bed for toileting  - Record patient progress and toleration of activity level   3/21/2022 1842 by Laura Peraza RN  Outcome: Progressing  3/21/2022 1841 by Laura Peraza RN  Outcome: Progressing     Problem: Knowledge Deficit  Goal: Patient/family/caregiver demonstrates understanding of disease process, treatment plan, medications, and discharge instructions  Description: Complete learning assessment and assess knowledge base    Interventions:  - Provide teaching at level of understanding  - Provide teaching via preferred learning methods  3/21/2022 1842 by Lindsey Obregon RN  Outcome: Progressing  3/21/2022 1841 by Lindsey Obregon RN  Outcome: Progressing     Problem: DISCHARGE PLANNING  Goal: Discharge to home or other facility with appropriate resources  Description: INTERVENTIONS:  - Identify barriers to discharge w/patient and caregiver  - Arrange for needed discharge resources and transportation as appropriate  - Identify discharge learning needs (meds, wound care, etc )  - Arrange for interpretive services to assist at discharge as needed  - Refer to Case Management Department for coordinating discharge planning if the patient needs post-hospital services based on physician/advanced practitioner order or complex needs related to functional status, cognitive ability, or social support system  3/21/2022 1842 by Lindsey Obregon RN  Outcome: Progressing  3/21/2022 1841 by Lindsey Obregon RN  Outcome: Progressing

## 2022-03-21 NOTE — OB LABOR/OXYTOCIN SAFETY PROGRESS
Labor Progress Note - Tigre Thomson 39 y o  female MRN: [id number]    Unit/Bed#: -01 Encounter: [id number]    Dose (jennifer-units/min) Oxytocin: 14 jennifer-units/min  Contraction Frequency (minutes): 3-4  Contraction Quality: Moderate  Tachysystole: No   Cervical Dilation: 5        Cervical Effacement: 60  Fetal Station: -2  Baseline Rate: 150 bpm  Fetal Heart Rate: 150 BPM  FHR Category: Category I               Vital Signs:   Vitals:    03/21/22 1119   BP: (!) 146/102   Pulse: 72   Resp:    Temp:        Notes/comments: Balloon out with exam, which was done due to pain and requesting epidural  Good progress          Sumanth Carrillo MD 3/21/2022 12:00 PM

## 2022-03-21 NOTE — PLAN OF CARE
Problem: Knowledge Deficit  Goal: Verbalizes understanding of labor plan  Description: Assess patient/family/caregiver's baseline knowledge level and ability to understand information  Provide education via patient/family/caregiver's preferred learning method at appropriate level of understanding  1  Provide teaching at level of understanding  2  Provide teaching via preferred learning method(s)   3/21/2022 1841 by Kodi Prakash RN  Outcome: Completed  3/21/2022 1029 by Kodi Prakash RN  Outcome: Progressing     Problem: Labor & Delivery  Goal: Manages discomfort  Description: Assess and monitor for signs and symptoms of discomfort  Assess patient's pain level regularly and per hospital policy  Administer medications as ordered  Support use of nonpharmacological methods to help control pain such as distraction, imagery, relaxation, and application of heat and cold  Collaborate with interdisciplinary team and patient to determine appropriate pain management plan  1  Include patient in decisions related to comfort  2  Offer non-pharmacological pain management interventions  3  Report ineffective pain management to physician   3/21/2022 1841 by Kodi Prakash RN  Outcome: Completed  3/21/2022 1029 by Kodi Prakash RN  Outcome: Progressing  Goal: Patient vital signs are stable  Description: 1  Assess vital signs - vaginal delivery    3/21/2022 1841 by Kodi Prakash RN  Outcome: Completed  3/21/2022 1029 by Kodi Prakash RN  Outcome: Progressing

## 2022-03-21 NOTE — OB LABOR/OXYTOCIN SAFETY PROGRESS
Labor Progress Note - Lexie Thomson 39 y o  female MRN: [id number]    Unit/Bed#: -01 Encounter: [id number]    Dose (jennifer-units/min) Oxytocin: 18 jennifer-units/min  Contraction Frequency (minutes): 3  Contraction Quality: Moderate  Tachysystole: No   Cervical Dilation: 8        Cervical Effacement: 60  Fetal Station: -1  Baseline Rate: 145 bpm  Fetal Heart Rate: 150 BPM  FHR Category: Category I               Vital Signs:   Vitals:    03/21/22 1421   BP: 119/65   Pulse: 90   Resp:    Temp:    SpO2:        Notes/comments: Comfortable with epidural  Bps are in the normal range without medication        Jeannette Patino MD 3/21/2022 3:00 PM

## 2022-03-22 LAB
ERYTHROCYTE [DISTWIDTH] IN BLOOD BY AUTOMATED COUNT: 13 % (ref 11.6–15.1)
HCT VFR BLD AUTO: 31.1 % (ref 34.8–46.1)
HGB BLD-MCNC: 10 G/DL (ref 11.5–15.4)
MCH RBC QN AUTO: 29.9 PG (ref 26.8–34.3)
MCHC RBC AUTO-ENTMCNC: 32.2 G/DL (ref 31.4–37.4)
MCV RBC AUTO: 93 FL (ref 82–98)
PLATELET # BLD AUTO: 206 THOUSANDS/UL (ref 149–390)
PMV BLD AUTO: 11 FL (ref 8.9–12.7)
RBC # BLD AUTO: 3.35 MILLION/UL (ref 3.81–5.12)
WBC # BLD AUTO: 13.87 THOUSAND/UL (ref 4.31–10.16)

## 2022-03-22 PROCEDURE — 99024 POSTOP FOLLOW-UP VISIT: CPT | Performed by: STUDENT IN AN ORGANIZED HEALTH CARE EDUCATION/TRAINING PROGRAM

## 2022-03-22 PROCEDURE — 85027 COMPLETE CBC AUTOMATED: CPT | Performed by: OBSTETRICS & GYNECOLOGY

## 2022-03-22 RX ORDER — FERROUS SULFATE 325(65) MG
325 TABLET ORAL
Status: DISCONTINUED | OUTPATIENT
Start: 2022-03-22 | End: 2022-03-23 | Stop reason: HOSPADM

## 2022-03-22 RX ADMIN — PANTOPRAZOLE SODIUM 20 MG: 20 TABLET, DELAYED RELEASE ORAL at 07:35

## 2022-03-22 RX ADMIN — DOCUSATE SODIUM 100 MG: 100 CAPSULE, LIQUID FILLED ORAL at 18:08

## 2022-03-22 RX ADMIN — IBUPROFEN 600 MG: 600 TABLET ORAL at 12:04

## 2022-03-22 RX ADMIN — IBUPROFEN 600 MG: 600 TABLET ORAL at 18:07

## 2022-03-22 RX ADMIN — DOCUSATE SODIUM 100 MG: 100 CAPSULE, LIQUID FILLED ORAL at 07:35

## 2022-03-22 RX ADMIN — IBUPROFEN 600 MG: 600 TABLET ORAL at 05:55

## 2022-03-22 RX ADMIN — ACETAMINOPHEN 650 MG: 325 TABLET, FILM COATED ORAL at 22:46

## 2022-03-22 RX ADMIN — ACETAMINOPHEN 650 MG: 325 TABLET, FILM COATED ORAL at 12:53

## 2022-03-22 RX ADMIN — ACETAMINOPHEN 650 MG: 325 TABLET, FILM COATED ORAL at 18:09

## 2022-03-22 RX ADMIN — ACETAMINOPHEN 650 MG: 325 TABLET, FILM COATED ORAL at 07:34

## 2022-03-22 RX ADMIN — ACETAMINOPHEN 650 MG: 325 TABLET, FILM COATED ORAL at 03:31

## 2022-03-22 RX ADMIN — IBUPROFEN 600 MG: 600 TABLET ORAL at 00:15

## 2022-03-22 RX ADMIN — LEVOTHYROXINE SODIUM 75 MCG: 25 TABLET ORAL at 05:50

## 2022-03-22 NOTE — ASSESSMENT & PLAN NOTE
- Hemoglobin 10 0 on PPD#1 from 11 3 at time of admission    - Continue oral iron supplementation upon discharge

## 2022-03-22 NOTE — ASSESSMENT & PLAN NOTE
- Diagnosed based on mild-range blood pressures > 4 hours apart in antepartum period    - Blood pressures have remained overall stable since admission without need for antihypertensive medications  Isolated elevations around time of delivery in the setting of pain, but have remained overall adequately controlled without need for initiation of anti-hypertensive medications  Yamila Stephens blood pressure this morning, consistent with diagnosis to gHTN  No signs/symptoms of pre-eclampsia  - Reviewed postpartum home blood pressure monitoring with patient, including appropriate thresholds and signs/symptoms of pre-eclampsia  Patient has a home blood pressure monitor and will check at least once daily  Will plan for BP check in the office within 1 week

## 2022-03-22 NOTE — PLAN OF CARE
Problem: PAIN - ADULT  Goal: Verbalizes/displays adequate comfort level or baseline comfort level  Description: Interventions:  - Encourage patient to monitor pain and request assistance  - Assess pain using appropriate pain scale  - Administer analgesics based on type and severity of pain and evaluate response  - Implement non-pharmacological measures as appropriate and evaluate response  - Consider cultural and social influences on pain and pain management  - Notify physician/advanced practitioner if interventions unsuccessful or patient reports new pain  Outcome: Progressing     Problem: INFECTION - ADULT  Goal: Absence or prevention of progression during hospitalization  Description: INTERVENTIONS:  - Assess and monitor for signs and symptoms of infection  - Monitor lab/diagnostic results  - Monitor all insertion sites, i e  indwelling lines, tubes, and drains  - Monitor endotracheal if appropriate and nasal secretions for changes in amount and color  - [place] appropriate cooling/warming therapies per order  - Administer medications as ordered  - Instruct and encourage patient and family to use good hand hygiene technique  - Identify and instruct in appropriate isolation precautions for identified infection/condition  Outcome: Progressing  Goal: Absence of fever/infection during neutropenic period  Description: INTERVENTIONS:  - Monitor WBC    Outcome: Progressing     Problem: SAFETY ADULT  Goal: Patient will remain free of falls  Description: INTERVENTIONS:  - Educate patient/family on patient safety including physical limitations  - Instruct patient to call for assistance with activity   - Consult OT/PT to assist with strengthening/mobility   - Keep Call bell within reach  - Keep bed low and locked with side rails adjusted as appropriate  - Keep care items and personal belongings within reach  - Initiate and maintain comfort rounds  - Make Fall Risk Sign visible to staff  - Apply yellow socks and bracelet for high fall risk patients  - Consider moving patient to room near nurses station  Outcome: Progressing  Goal: Maintain or return to baseline ADL function  Description: INTERVENTIONS:  -  Assess patient's ability to carry out ADLs; assess patient's baseline for ADL function and identify physical deficits which impact ability to perform ADLs (bathing, care of mouth/teeth, toileting, grooming, dressing, etc )  - Assess/evaluate cause of self-care deficits   - Assess range of motion  - Assess patient's mobility; develop plan if impaired  - Assess patient's need for assistive devices and provide as appropriate  - Encourage maximum independence but intervene and supervise when necessary  - Involve family in performance of ADLs  - Assess for home care needs following discharge   - Consider OT consult to assist with ADL evaluation and planning for discharge  - Provide patient education as appropriate  Outcome: Progressing  Goal: Maintains/Returns to pre admission functional level  Description: INTERVENTIONS:  - Perform BMAT or MOVE assessment daily    - Set and communicate daily mobility goal to care team and patient/family/caregiver  - Collaborate with rehabilitation services on mobility goals if consulted  - Out of bed for toileting  - Record patient progress and toleration of activity level   Outcome: Progressing     Problem: Knowledge Deficit  Goal: Patient/family/caregiver demonstrates understanding of disease process, treatment plan, medications, and discharge instructions  Description: Complete learning assessment and assess knowledge base    Interventions:  - Provide teaching at level of understanding  - Provide teaching via preferred learning methods  Outcome: Progressing     Problem: DISCHARGE PLANNING  Goal: Discharge to home or other facility with appropriate resources  Description: INTERVENTIONS:  - Identify barriers to discharge w/patient and caregiver  - Arrange for needed discharge resources and transportation as appropriate  - Identify discharge learning needs (meds, wound care, etc )  - Arrange for interpretive services to assist at discharge as needed  - Refer to Case Management Department for coordinating discharge planning if the patient needs post-hospital services based on physician/advanced practitioner order or complex needs related to functional status, cognitive ability, or social support system  Outcome: Progressing

## 2022-03-22 NOTE — ASSESSMENT & PLAN NOTE
- Declined Lovenox during pregnancy  Utility of postpartum prophylactic anticoagulation has been reviewed with patient, given increased risk of VTE in postpartum period  She declines initiation of postpartum Lovenox  Given that her thoracic outlet syndrome has been surgically repaired and she has had negative thrombophilia workup, this is not unreasonable  She is aware that risk for VTE is elevated given her history  We reviewed importance of avoiding prolonged immobilization

## 2022-03-22 NOTE — PROGRESS NOTES
Progress Note - OB/GYN  Post-Partum Physician Note   Mc Thomson 39 y o  female MRN: [id number]  Unit/Bed#:  209-01 Encounter: [id number]    Assessment:  39y o  year-old , postpartum day #1 s/p     Plan:   Continue routine postpartum care  Encourage ambulation  Advance diet as tolerated    Iron deficiency anemia secondary to inadequate dietary iron intake  - Hemoglobin 10 0 this morning from 11 3 at time of admission    - Continue oral iron supplementation  Gestational hypertension, third trimester  - Diagnosed based on mild-range blood pressures > 4 hours apart  - Blood pressures have remained overall stable since admission without need for antihypertensive medications  Isolated elevations around time of delivery, but normal-range this morning  Will continue to monitor  History of DVT (deep vein thrombosis)  - Declined Lovenox during pregnancy  Reviewed utility of postpartum prophylactic anticoagulation with patient this morning, given increased risk of VTE in postpartum period  She declines initiation of postpartum Lovenox  Given that her thoracic outlet syndrome has been surgically repaired and she has had negative thrombophilia workup, this is not unreasonable  She is aware that risk for VTE is elevated given her history  We reviewed importance of avoiding prolonged immobilization         _________________________________    Subjective:   Pain: Well controlled  Tolerating Oral Intake: Yes  Voiding: Yes  Ambulating: Yes  Breastfeeding: Yes  Headache: No  Changes in vision: No  Chest Pain: No  Shortness of Breath: No  RUQ pain: No  Leg Pain/Discomfort: No  Lochia: Normal    Objective:   Vitals:    22 2000 22 2240 22 0300 22 0713   BP: 140/82 136/89 126/76 120/78   BP Location: Right arm Right arm Right arm Right arm   Pulse: 65 89 74 68   Resp: 18 18 18 12   Temp: 97 6 °F (36 4 °C) 98 °F (36 7 °C) 98 3 °F (36 8 °C) 98 3 °F (36 8 °C)   TempSrc: Temporal Oral Oral Oral   SpO2: 99% 98% 96% 98%   Weight:       Height:           Intake/Output Summary (Last 24 hours) at 3/22/2022 0609  Last data filed at 3/22/2022 0013  Gross per 24 hour   Intake 1000 ml   Output 2350 ml   Net -1350 ml       Physical Exam:  General: in no apparent distress, alert and oriented times 3  Abdomen: abdomen is soft without significant tenderness, masses, organomegaly or guarding  Fundus: Firm, 1 below the umbilicus  Lower extremeties: non-tender, 1+ symmetric edema      Labs/Tests:   Lab Results   Component Value Date/Time    HGB 10 0 (L) 03/22/2022 05:44 AM    HGB 11 3 (L) 03/21/2022 08:49 AM     03/22/2022 05:44 AM     03/21/2022 08:49 AM    WBC 13 87 (H) 03/22/2022 05:44 AM    WBC 10 49 (H) 03/21/2022 08:49 AM    CREATININE 0 54 (L) 03/21/2022 08:49 AM    ALT 14 03/21/2022 08:49 AM    AST 11 03/21/2022 08:49 AM    UTPCR 0 20 (H) 03/21/2022 08:49 AM        Brief OB Lab review:  ABO Grouping   Date Value Ref Range Status   03/21/2022 A  Final      Rh Factor   Date Value Ref Range Status   03/21/2022 Positive  Final    No results found for: ANTIBODYSCR  No results found for: RUBM    MEDS:   Current Facility-Administered Medications   Medication Dose Route Frequency    acetaminophen (TYLENOL) tablet 650 mg  650 mg Oral Q4H PRN    benzocaine-menthol-lanolin-aloe (DERMOPLAST) 20-0 5 % topical spray 1 application  1 application Topical I7N PRN    docusate sodium (COLACE) capsule 100 mg  100 mg Oral BID    ferrous sulfate tablet 325 mg  325 mg Oral Daily With Breakfast    hydrocortisone 1 % cream 1 application  1 application Topical Daily PRN    ibuprofen (MOTRIN) tablet 600 mg  600 mg Oral Q6H    levothyroxine tablet 75 mcg  75 mcg Oral Daily    ondansetron (ZOFRAN) injection 4 mg  4 mg Intravenous Q8H PRN    pantoprazole (PROTONIX) EC tablet 20 mg  20 mg Oral Early Morning    simethicone (MYLICON) chewable tablet 80 mg  80 mg Oral 4x Daily PRN    witch hazel-glycerin (TUCKS) topical pad 1 pad  1 pad Topical Q4H PRN     Invasive Devices  Report    Peripheral Intravenous Line            Peripheral IV 03/21/22 Dorsal (posterior); Left Hand <1 day                  Bandar Hooks MD  3/22/2022 8:33 AM

## 2022-03-23 ENCOUNTER — TELEPHONE (OUTPATIENT)
Dept: LABOR AND DELIVERY | Facility: HOSPITAL | Age: 37
End: 2022-03-23

## 2022-03-23 VITALS
DIASTOLIC BLOOD PRESSURE: 97 MMHG | RESPIRATION RATE: 18 BRPM | HEIGHT: 65 IN | OXYGEN SATURATION: 98 % | WEIGHT: 204.78 LBS | HEART RATE: 73 BPM | TEMPERATURE: 98 F | BODY MASS INDEX: 34.12 KG/M2 | SYSTOLIC BLOOD PRESSURE: 137 MMHG

## 2022-03-23 PROCEDURE — 99024 POSTOP FOLLOW-UP VISIT: CPT | Performed by: STUDENT IN AN ORGANIZED HEALTH CARE EDUCATION/TRAINING PROGRAM

## 2022-03-23 RX ORDER — IBUPROFEN 600 MG/1
600 TABLET ORAL EVERY 6 HOURS PRN
Qty: 30 TABLET | Refills: 0
Start: 2022-03-23

## 2022-03-23 RX ORDER — DOCUSATE SODIUM 100 MG/1
100 CAPSULE, LIQUID FILLED ORAL 2 TIMES DAILY PRN
Refills: 0
Start: 2022-03-23 | End: 2022-05-12 | Stop reason: ALTCHOICE

## 2022-03-23 RX ORDER — ACETAMINOPHEN 325 MG/1
650 TABLET ORAL EVERY 4 HOURS PRN
Refills: 0
Start: 2022-03-23

## 2022-03-23 RX ORDER — FERROUS SULFATE 137(45) MG
1 TABLET, EXTENDED RELEASE ORAL DAILY
Refills: 0
Start: 2022-03-23 | End: 2022-05-12 | Stop reason: ALTCHOICE

## 2022-03-23 RX ADMIN — WITCH HAZEL 1 PAD: 500 SOLUTION RECTAL; TOPICAL at 02:42

## 2022-03-23 RX ADMIN — LEVOTHYROXINE SODIUM 75 MCG: 25 TABLET ORAL at 06:19

## 2022-03-23 RX ADMIN — IBUPROFEN 600 MG: 600 TABLET ORAL at 06:19

## 2022-03-23 RX ADMIN — ACETAMINOPHEN 650 MG: 325 TABLET, FILM COATED ORAL at 02:43

## 2022-03-23 RX ADMIN — IBUPROFEN 600 MG: 600 TABLET ORAL at 00:01

## 2022-03-23 RX ADMIN — DOCUSATE SODIUM 100 MG: 100 CAPSULE, LIQUID FILLED ORAL at 08:05

## 2022-03-23 RX ADMIN — PANTOPRAZOLE SODIUM 20 MG: 20 TABLET, DELAYED RELEASE ORAL at 07:16

## 2022-03-23 RX ADMIN — ACETAMINOPHEN 650 MG: 325 TABLET, FILM COATED ORAL at 06:19

## 2022-03-23 RX ADMIN — IBUPROFEN 600 MG: 600 TABLET ORAL at 11:07

## 2022-03-23 NOTE — PLAN OF CARE
Problem: PAIN - ADULT  Goal: Verbalizes/displays adequate comfort level or baseline comfort level  Description: Interventions:  - Encourage patient to monitor pain and request assistance  - Assess pain using appropriate pain scale  - Administer analgesics based on type and severity of pain and evaluate response  - Implement non-pharmacological measures as appropriate and evaluate response  - Consider cultural and social influences on pain and pain management  - Notify physician/advanced practitioner if interventions unsuccessful or patient reports new pain  3/23/2022 1001 by Tiffany Pryor RN  Outcome: Completed  3/23/2022 0908 by Tiffany Pryor RN  Outcome: Progressing     Problem: INFECTION - ADULT  Goal: Absence or prevention of progression during hospitalization  Description: INTERVENTIONS:  - Assess and monitor for signs and symptoms of infection  - Monitor lab/diagnostic results  - Monitor all insertion sites, i e  indwelling lines, tubes, and drains  - Monitor endotracheal if appropriate and nasal secretions for changes in amount and color  - [place] appropriate cooling/warming therapies per order  - Administer medications as ordered  - Instruct and encourage patient and family to use good hand hygiene technique  - Identify and instruct in appropriate isolation precautions for identified infection/condition  3/23/2022 1001 by Tiffany Pryor RN  Outcome: Completed  3/23/2022 0908 by Tiffany Pryor RN  Outcome: Progressing  Goal: Absence of fever/infection during neutropenic period  Description: INTERVENTIONS:  - Monitor WBC    3/23/2022 1001 by Tiffany Pryor RN  Outcome: Completed  3/23/2022 0908 by Tiffany Pryor RN  Outcome: Progressing     Problem: SAFETY ADULT  Goal: Patient will remain free of falls  Description: INTERVENTIONS:  - Educate patient/family on patient safety including physical limitations  - Instruct patient to call for assistance with activity   - Consult OT/PT to assist with strengthening/mobility   - Keep Call bell within reach  - Keep bed low and locked with side rails adjusted as appropriate  - Keep care items and personal belongings within reach  - Initiate and maintain comfort rounds  - Make Fall Risk Sign visible to staff  3/23/2022 1001 by Jose Haines RN  Outcome: Completed  3/23/2022 0908 by Jose Haines RN  Outcome: Progressing  Goal: Maintain or return to baseline ADL function  Description: INTERVENTIONS:  -  Assess patient's ability to carry out ADLs; assess patient's baseline for ADL function and identify physical deficits which impact ability to perform ADLs (bathing, care of mouth/teeth, toileting, grooming, dressing, etc )  - Assess/evaluate cause of self-care deficits   - Assess range of motion  - Assess patient's mobility; develop plan if impaired  - Assess patient's need for assistive devices and provide as appropriate  - Encourage maximum independence but intervene and supervise when necessary  - Involve family in performance of ADLs  - Assess for home care needs following discharge   - Consider OT consult to assist with ADL evaluation and planning for discharge  - Provide patient education as appropriate  3/23/2022 1001 by Jose Haines RN  Outcome: Completed  3/23/2022 0908 by Jose Haines RN  Outcome: Progressing  Goal: Maintains/Returns to pre admission functional level  Description: INTERVENTIONS:  - Perform BMAT or MOVE assessment daily    - Set and communicate daily mobility goal to care team and patient/family/caregiver     - Collaborate with rehabilitation services on mobility goals if consulted  - Record patient progress and toleration of activity level   3/23/2022 1001 by Jose Haines RN  Outcome: Completed  3/23/2022 0908 by Jose Haines RN  Outcome: Progressing     Problem: Knowledge Deficit  Goal: Patient/family/caregiver demonstrates understanding of disease process, treatment plan, medications, and discharge instructions  Description: Complete learning assessment and assess knowledge base    Interventions:  - Provide teaching at level of understanding  - Provide teaching via preferred learning methods  3/23/2022 1001 by Eliot Wagner RN  Outcome: Completed  3/23/2022 0908 by Eliot Wagner RN  Outcome: Progressing     Problem: DISCHARGE PLANNING  Goal: Discharge to home or other facility with appropriate resources  Description: INTERVENTIONS:  - Identify barriers to discharge w/patient and caregiver  - Arrange for needed discharge resources and transportation as appropriate  - Identify discharge learning needs (meds, wound care, etc )  - Arrange for interpretive services to assist at discharge as needed  - Refer to Case Management Department for coordinating discharge planning if the patient needs post-hospital services based on physician/advanced practitioner order or complex needs related to functional status, cognitive ability, or social support system  3/23/2022 1001 by Eliot Wagner RN  Outcome: Completed  3/23/2022 0908 by Eliot Wagner RN  Outcome: Progressing

## 2022-03-23 NOTE — PROGRESS NOTES
Progress Note - OB/GYN  Post-Partum Physician Note   Linette Thomson 39 y o  female MRN: [id number]  Unit/Bed#: -01 Encounter: [id number]    Assessment:  39y o  year-old , postpartum day #2 s/p     Plan:   Continue routine postpartum care  Encourage ambulation  Clinically stable for discharge to home today  Iron deficiency anemia secondary to inadequate dietary iron intake  - Hemoglobin 10 0 on PPD#1 from 11 3 at time of admission    - Continue oral iron supplementation upon discharge  Gestational hypertension, third trimester  - Diagnosed based on mild-range blood pressures > 4 hours apart in antepartum period    - Blood pressures have remained overall stable since admission without need for antihypertensive medications  Isolated elevations around time of delivery in the setting of pain, but have remained overall adequately controlled without need for initiation of anti-hypertensive medications  Eli Head blood pressure this morning, consistent with diagnosis to [place]  No signs/symptoms of pre-eclampsia  - Reviewed postpartum home blood pressure monitoring with patient, including appropriate thresholds and signs/symptoms of pre-eclampsia  Patient has a home blood pressure monitor and will check at least once daily  Will plan for BP check in the office within 1 week  History of DVT (deep vein thrombosis)  - Declined Lovenox during pregnancy  Utility of postpartum prophylactic anticoagulation has been reviewed with patient, given increased risk of VTE in postpartum period  She declines initiation of postpartum Lovenox  Given that her thoracic outlet syndrome has been surgically repaired and she has had negative thrombophilia workup, this is not unreasonable  She is aware that risk for VTE is elevated given her history  We reviewed importance of avoiding prolonged immobilization         _________________________________    Subjective:   Pain: Well controlled  Tolerating Oral Intake: Yes  Voiding: Yes  Ambulating: Yes  Breastfeeding: Yes  Headache: No  Changes in vision: No  Chest Pain: No  Shortness of Breath: No  RUQ pain: No  Leg Pain/Discomfort: No  Lochia: Normal    Objective:   Vitals:    03/22/22 1534 03/22/22 1900 03/22/22 2300 03/23/22 0700   BP: 134/95 141/86 134/82 146/94   BP Location: Right arm Right arm Right arm Left arm   Pulse: 62 72 71 76   Resp: 16 17 15 18   Temp: 97 8 °F (36 6 °C) 98 4 °F (36 9 °C) 97 7 °F (36 5 °C) 98 °F (36 7 °C)   TempSrc: Temporal Oral Oral Temporal   SpO2: 99% 97% 96% 98%   Weight:       Height:         No intake or output data in the 24 hours ending 03/23/22 0914    Physical Exam:  General: in no apparent distress  Abdomen: abdomen is soft without significant tenderness, masses, organomegaly or guarding  Fundus: Firm and non-tender, 1 below the umbilicus  Lower extremeties: non-tender, 1+ symmetric LE edema    Labs/Tests:   Lab Results   Component Value Date/Time    HGB 10 0 (L) 03/22/2022 05:44 AM    HGB 11 3 (L) 03/21/2022 08:49 AM     03/22/2022 05:44 AM     03/21/2022 08:49 AM    WBC 13 87 (H) 03/22/2022 05:44 AM    WBC 10 49 (H) 03/21/2022 08:49 AM    CREATININE 0 54 (L) 03/21/2022 08:49 AM    ALT 14 03/21/2022 08:49 AM    AST 11 03/21/2022 08:49 AM    UTPCR 0 20 (H) 03/21/2022 08:49 AM        Brief OB Lab review:  ABO Grouping   Date Value Ref Range Status   03/21/2022 A  Final      Rh Factor   Date Value Ref Range Status   03/21/2022 Positive  Final    No results found for: ANTIBODYSCR  No results found for: RUBM    MEDS:   Current Facility-Administered Medications   Medication Dose Route Frequency    acetaminophen (TYLENOL) tablet 650 mg  650 mg Oral Q4H PRN    benzocaine-menthol-lanolin-aloe (DERMOPLAST) 20-0 5 % topical spray 1 application  1 application Topical D4E PRN    docusate sodium (COLACE) capsule 100 mg  100 mg Oral BID    ferrous sulfate tablet 325 mg  325 mg Oral Daily With Breakfast    hydrocortisone 1 % cream 1 application  1 application Topical Daily PRN    ibuprofen (MOTRIN) tablet 600 mg  600 mg Oral Q6H    levothyroxine tablet 75 mcg  75 mcg Oral Daily    ondansetron (ZOFRAN) injection 4 mg  4 mg Intravenous Q8H PRN    pantoprazole (PROTONIX) EC tablet 20 mg  20 mg Oral Early Morning    simethicone (MYLICON) chewable tablet 80 mg  80 mg Oral 4x Daily PRN    witch hazel-glycerin (TUCKS) topical pad 1 pad  1 pad Topical Q4H PRN     Invasive Devices  Report    None                   Syed Villagran MD  3/23/2022 9:14 AM

## 2022-03-23 NOTE — TELEPHONE ENCOUNTER
Patient is being discharged to home today following vaginal delivery on 3/21  She should be scheduled for a nursing visit within 1 week for BP check due to gestational hypertension, as well as routine postpartum visit in 6 weeks       Elizabeth Steward MD  3/23/2022 10:46 AM

## 2022-03-23 NOTE — PLAN OF CARE
Problem: PAIN - ADULT  Goal: Verbalizes/displays adequate comfort level or baseline comfort level  Description: Interventions:  - Encourage patient to monitor pain and request assistance  - Assess pain using appropriate pain scale  - Administer analgesics based on type and severity of pain and evaluate response  - Implement non-pharmacological measures as appropriate and evaluate response  - Consider cultural and social influences on pain and pain management  - Notify physician/advanced practitioner if interventions unsuccessful or patient reports new pain  Outcome: Progressing     Problem: INFECTION - ADULT  Goal: Absence or prevention of progression during hospitalization  Description: INTERVENTIONS:  - Assess and monitor for signs and symptoms of infection  - Monitor lab/diagnostic results  - Monitor all insertion sites, i e  indwelling lines, tubes, and drains  - Monitor endotracheal if appropriate and nasal secretions for changes in amount and color  - [place] appropriate cooling/warming therapies per order  - Administer medications as ordered  - Instruct and encourage patient and family to use good hand hygiene technique  - Identify and instruct in appropriate isolation precautions for identified infection/condition  Outcome: Progressing  Goal: Absence of fever/infection during neutropenic period  Description: INTERVENTIONS:  - Monitor WBC    Outcome: Progressing     Problem: SAFETY ADULT  Goal: Patient will remain free of falls  Description: INTERVENTIONS:  - Educate patient/family on patient safety including physical limitations  - Instruct patient to call for assistance with activity   - Consult OT/PT to assist with strengthening/mobility   - Keep Call bell within reach  - Keep bed low and locked with side rails adjusted as appropriate  - Keep care items and personal belongings within reach  - Initiate and maintain comfort rounds  - Make Fall Risk Sign visible to staff  -Outcome: Progressing  Goal: Maintain or return to baseline ADL function  Description: INTERVENTIONS:  -  Assess patient's ability to carry out ADLs; assess patient's baseline for ADL function and identify physical deficits which impact ability to perform ADLs (bathing, care of mouth/teeth, toileting, grooming, dressing, etc )  - Assess/evaluate cause of self-care deficits   - Assess range of motion  - Assess patient's mobility; develop plan if impaired  - Assess patient's need for assistive devices and provide as appropriate  - Encourage maximum independence but intervene and supervise when necessary  - Involve family in performance of ADLs  - Assess for home care needs following discharge   - Consider OT consult to assist with ADL evaluation and planning for discharge  - Provide patient education as appropriate  Outcome: Progressing  Goal: Maintains/Returns to pre admission functional level  Description: INTERVENTIONS:  - Perform BMAT or MOVE assessment daily    - Set and communicate daily mobility goal to care team and patient/family/caregiver  - Collaborate with rehabilitation services on mobility goals if consulted  - Out of bed for toileting  - Record patient progress and toleration of activity level   Outcome: Progressing     Problem: Knowledge Deficit  Goal: Patient/family/caregiver demonstrates understanding of disease process, treatment plan, medications, and discharge instructions  Description: Complete learning assessment and assess knowledge base    Interventions:  - Provide teaching at level of understanding  - Provide teaching via preferred learning methods  Outcome: Progressing     Problem: DISCHARGE PLANNING  Goal: Discharge to home or other facility with appropriate resources  Description: INTERVENTIONS:  - Identify barriers to discharge w/patient and caregiver  - Arrange for needed discharge resources and transportation as appropriate  - Identify discharge learning needs (meds, wound care, etc )  - Arrange for interpretive services to assist at discharge as needed  - Refer to Case Management Department for coordinating discharge planning if the patient needs post-hospital services based on physician/advanced practitioner order or complex needs related to functional status, cognitive ability, or social support system  Outcome: Progressing

## 2022-03-23 NOTE — LACTATION NOTE
This note was copied from a baby's chart  Met with parents to review Ready, Set, Baby Booklet  Discussed importance of skin to skin to help baby awaken for breastfeeding and to help with milk production as well as stabilize temperature, blood sugars, decrease pain, promote relaxation, and calm the baby as well as for bonding (father may do as well)  Showed images of tummy size progression as milk production increases to meet the nutritional/growing needs of the baby and risks associated with introducing supplementation that would disrupt the process  Discussed Second Night Syndrome explaining how babys cluster feed to meet needs  Growth spurts explained and how cluster feeding helps boost milk supply  Explained feeding cues and fullness cues as well as importance of obtaining a deep latch for effective milk removal and proper positioning (tummy to tummy, at level, nose to nipple, bring chin to breast first and bringing baby to breast) with ear, shoulder, and hip alignment  Showed images of hand expression  Reviewed Breastfeeding Discharge Booklet as discharge is planned for today  Discussed feeding log to continue using once home for up to the week ensuring that baby feeds 8-12x in 24 hours and that baby has 6-8 wet diapers as well as 3-4 soiled diapers (looking for stool transition from meconium to a yellow/gold seedy loose stool)  Mother given resources to look up medications to ensure they are safe with breastfeeding, by communicating with the [facility], [address] as well as using [employer] (assisted mother to pin to home screen on personal phone)    Discussed engorgement time frame (when mature milk comes in) and management as well as how to deal with conditions that may occur while breastfeeding (plugged ducts, milk blebs and mastitis) and when is appropriate to communicate with their OB/GYN and/or a lactation consultant      Discussed how to set up a pump, how to cycle (stimulation vs expression phases during a pumping session), milk storage and cleaning  Mother shown handouts for tips on pumping when returning to work and paced bottle feeding  Mother shown community resources for continued support in breastfeeding once discharged and encouraged to communicate with Butch Morgan and/or a lactation consultant to further assistance once home      Mother encouraged to communicate with lactation for further questions/concerns as they arise

## 2022-03-23 NOTE — DISCHARGE SUMMARY
Discharge Summary - OB/GYN   Ankit Thomson 39 y o  female MRN: [id number]  Unit/Bed#: -01 Encounter: [id number]    Admission Date: 3/21/2022     Discharge Date: 22    Principal Diagnosis: Encounter for full-term uncomplicated delivery [R05], [id number] Pregnancy at 37w0d    Secondary Diagnosis: Gestational hypertension    Attending - Delivery: Dorean Bosworth, MD         - Discharge: Ji Valladares MD    Procedures: Vaginal, Spontaneous , no laceration    Anesthesia: epidural    Complications: none apparent    Hospital Course:      Kevin Goldman is a 39 y o  [id number] at 37w0d who was admitted for induction of labor for gestational hypertension    She delivered a viable female  with weight of 3100 grams, apgars of 8 (1 min) and 9 (5 min)   was transferred to  nursery  Patient tolerated the procedure well and was transferred to recovery in stable condition  Aside from a diagnosis of gestational hypertension, her postpartum course was uncomplicated  Admission hemoglobin was 11 3, postpartum was 10 0  On day of discharge, she was ambulating and able to reasonably perform all ADLs  She was voiding and had appropriate bowel function  Pain was well controlled  She was discharged home on postpartum day #2 without complications  Patient was instructed to follow up with her OB as an outpatient and was given appropriate warnings to call provider if she develops signs of infection or uncontrolled pain  She was discharged to home on oral iron supplementation and was instructed on home blood pressure monitoring, as well as signs and symptoms of pre-eclampsia  She will follow up in the office for a blood pressure check within 1 week  Condition at discharge: good     Discharge instructions/Information to patient and family:   See after visit summary for information provided to patient and family        Provisions for Follow-Up Care:  See after visit summary for information related to follow-up care and any pertinent home health orders  Disposition: See After Visit Summary for discharge disposition information  Planned Readmission: No    Discharge Medications: For a complete list of the patient's medications, please refer to her med rec      Susan Fowler MD  3/23/2022 9:23 AM

## 2022-03-23 NOTE — DISCHARGE INSTRUCTIONS
You should take your blood pressure at home one time daily  Normal-range blood pressures in the postpartum period are less than 140 for the top number (systolic) AND less than 90 for the bottom number (diastolic)    Mild-range blood pressures in the postpartum period are 998-238 systolic OR  diastolic  "Mild-range" blood pressures are not unexpected for patients with a known diagnosis of gestational hypertension  Severe-range blood pressures in the postpartum period are 160 or greater systolic  or greater diastolic  If your blood pressure is greater than 160/110, you should promptly call [id number] [facility]  at [phone] and proceed to the hospital for evaluation for pre-eclampsia  Symptoms of Pre-Eclampsia include headache that does not go away with Tylenol, changes in vision such as blurred vision or spots in your vision, chest pain, shortness of breath, pain in the upper right side of your abdomen, or sudden onset or worsening of swelling  If you develop any of the above symptoms, you should promptly call [facility] [address] [facility]  at [phone] and present to the hospital for evaluation of pre-eclampsia  Postpartum Bleeding   WHAT YOU NEED TO KNOW:   Postpartum bleeding is vaginal bleeding after childbirth  This bleeding is normal, whether your baby was born vaginally or by   It contains blood and the tissue that lined the inside of your uterus when you were pregnant  DISCHARGE INSTRUCTIONS:   Call your local emergency number (911 in the [address] [place] [address],[address]) if:   · You are suddenly short of breath and feel lightheaded  · You have sudden chest pain  · You are breathing faster than normal     · Your heart is beating faster than normal     Call your doctor or obstetrician if:   · Your bleeding increases, or you have heavy bleeding that soaks 1 pad in 1 hour for 2 hours in a row  · You have a fever  · You pass large blood clots      · You feel dizzy     · You have a low back ache, abdominal pain or tenderness, or loss of appetite  · You are urinating less than usual, or not at all  · You have questions or concerns about your condition or care  What to expect with postpartum bleeding:  Postpartum bleeding usually lasts at least 10 days, and may last longer than 6 weeks  Your bleeding may range from light (barely staining a pad) to heavy (soaking a pad in 1 hour)  Usually, you have heavier bleeding right after childbirth, which slows over the next few weeks until it stops  The bleeding is red or dark brown with clots for the first 1 to 3 days  It then turns pink for several days, and then becomes a white or yellow discharge until it ends  Follow up with your obstetrician as directed:  Do not have sex until your obstetrician says it is okay  Write down your questions so you remember to ask them during your visits  © Copyright [address] 2022 Information is for End User's use only and may not be sold, redistributed or otherwise used for commercial purposes  All illustrations and images included in CareNotes® are the copyrighted property of A D A M , Inc  or [address] [place] [address]  The above information is an  only  It is not intended as medical advice for individual conditions or treatments  Talk to your doctor, nurse or pharmacist before following any medical regimen to see if it is safe and effective for you  Postpartum Perineal Care   WHAT YOU NEED TO KNOW:   Postpartum perineal care is care for your perineum after you have a baby  The perineum is the area between your vagina and anus  DISCHARGE INSTRUCTIONS:   Contact your healthcare provider if:   · You have large blood clots or bright red blood coming from your vagina  · You have abdominal pain, vomiting, and a fever  · You have heavy vaginal bleeding that fills 1 or more sanitary pads in 1 hour  · You have foul-smelling vaginal discharge      · You feel weak or lightheaded  · You have questions or concerns about your condition or care  Care for your perineum:  Healthcare providers will give you a small squirt bottle and show you how to use it  Do the following after you use the toilet and before you put on a new pad:  · Remove the soiled pad    · Use the squirt bottle to rinse your perineum from front to back while you sit on the toilet     · Pat the area dry from front to back with toilet paper or a cotton cloth     · Put on a fresh pad    · Wash your hands    Decrease pain:   · Take a sitz bath  Fill a bathtub with 4 to 6 inches of warm water  You may also use a sitz bath pan that fits over a toilet  Sit in the sitz bath for 20 minutes  Do this 2 to 3 times a day, or as directed  · Apply ice  on your perineal area for 15 to 20 minutes every hour or as directed  Use an ice pack, or put crushed ice in a plastic bag  Cover it with a towel  · Use medicine spray, wipes, or pads as directed  Healthcare providers may give you a medicine spray or wipes soaked with numbing medicine to decrease the pain  Pads that contain an herb called witch hazel may also help reduce pain  Use these after perineal care or a sitz bath  Follow up with your doctor as directed:  Write down your questions so you remember to ask them during your visits  © Copyright [employer] 2022 Information is for End User's use only and may not be sold, redistributed or otherwise used for commercial purposes  All illustrations and images included in CareNotes® are the copyrighted property of A D A M , Inc  or [employer] Spencer   The above information is an  only  It is not intended as medical advice for individual conditions or treatments  Talk to your doctor, nurse or pharmacist before following any medical regimen to see if it is safe and effective for you

## 2022-03-24 ENCOUNTER — TELEPHONE (OUTPATIENT)
Dept: OBGYN CLINIC | Facility: CLINIC | Age: 37
End: 2022-03-24

## 2022-03-24 NOTE — TELEPHONE ENCOUNTER
Kalin Nelson called reporting she delivered and was discharged yesterday  This morning noted bilateral feet/ankle edema  Reports she has been drinking fluids and no added salt  She is trying to rest and elevate but was up frequently nursing throughout the night  Reports edema is pitted  B/P this morning 136/84  Has one week office appt for b/p check  Denies edema other than ankles/feet  Reports she has a h/o migraine's which she had earlier this morning, headache resolved with tylenol  Recommended continue with fluid intake, avoid salt, rest and elevate lower extremities when able  Can try compression stockings to improve circulation  C/B with any additional concerns or questions or if B/P higher than 160/90  Advised will review with Dr Jennifer Watts and call back with additional recommendations  Reviewed above with Dr Jennifer Watts who agrees with above recommendations  Spoke with patient, advised no additional recommendations, continue to monitor and c/b with questions or concerns

## 2022-03-25 ENCOUNTER — APPOINTMENT (EMERGENCY)
Dept: RADIOLOGY | Facility: HOSPITAL | Age: 37
DRG: 776 | End: 2022-03-25
Payer: COMMERCIAL

## 2022-03-25 ENCOUNTER — TELEPHONE (OUTPATIENT)
Dept: OBGYN CLINIC | Facility: CLINIC | Age: 37
End: 2022-03-25

## 2022-03-25 ENCOUNTER — HOSPITAL ENCOUNTER (INPATIENT)
Facility: HOSPITAL | Age: 37
LOS: 2 days | Discharge: HOME | DRG: 776 | End: 2022-03-28
Attending: EMERGENCY MEDICINE | Admitting: OBSTETRICS & GYNECOLOGY
Payer: COMMERCIAL

## 2022-03-25 DIAGNOSIS — N71.9 ENDOMETRITIS: ICD-10-CM

## 2022-03-25 LAB
ALBUMIN SERPL BCP-MCNC: 2.4 G/DL (ref 3.5–5)
ALP SERPL-CCNC: 106 U/L (ref 46–116)
ALT SERPL W P-5'-P-CCNC: 27 U/L (ref 12–78)
ANION GAP SERPL CALCULATED.3IONS-SCNC: 13 MMOL/L (ref 4–13)
APTT PPP: 30 SECONDS (ref 23–37)
AST SERPL W P-5'-P-CCNC: 21 U/L (ref 5–45)
BILIRUB SERPL-MCNC: 0.4 MG/DL (ref 0.2–1)
BUN SERPL-MCNC: 9 MG/DL (ref 5–25)
CALCIUM ALBUM COR SERPL-MCNC: 9.5 MG/DL (ref 8.3–10.1)
CALCIUM SERPL-MCNC: 8.2 MG/DL (ref 8.3–10.1)
CHLORIDE SERPL-SCNC: 103 MMOL/L (ref 100–108)
CO2 SERPL-SCNC: 22 MMOL/L (ref 21–32)
CREAT SERPL-MCNC: 0.7 MG/DL (ref 0.6–1.3)
GFR SERPL CREATININE-BSD FRML MDRD: 111 ML/MIN/1.73SQ M
GLUCOSE SERPL-MCNC: 112 MG/DL (ref 65–140)
INR PPP: 1.04 (ref 0.84–1.19)
POTASSIUM SERPL-SCNC: 3.5 MMOL/L (ref 3.5–5.3)
PROT SERPL-MCNC: 6.3 G/DL (ref 6.4–8.2)
PROTHROMBIN TIME: 13.5 SECONDS (ref 11.6–14.5)
SODIUM SERPL-SCNC: 138 MMOL/L (ref 136–145)

## 2022-03-25 PROCEDURE — 99285 EMERGENCY DEPT VISIT HI MDM: CPT

## 2022-03-25 PROCEDURE — 36415 COLL VENOUS BLD VENIPUNCTURE: CPT | Performed by: EMERGENCY MEDICINE

## 2022-03-25 PROCEDURE — 84145 PROCALCITONIN (PCT): CPT | Performed by: EMERGENCY MEDICINE

## 2022-03-25 PROCEDURE — 85025 COMPLETE CBC W/AUTO DIFF WBC: CPT | Performed by: EMERGENCY MEDICINE

## 2022-03-25 PROCEDURE — 93005 ELECTROCARDIOGRAM TRACING: CPT

## 2022-03-25 PROCEDURE — 99285 EMERGENCY DEPT VISIT HI MDM: CPT | Performed by: EMERGENCY MEDICINE

## 2022-03-25 PROCEDURE — 87040 BLOOD CULTURE FOR BACTERIA: CPT | Performed by: EMERGENCY MEDICINE

## 2022-03-25 PROCEDURE — 85730 THROMBOPLASTIN TIME PARTIAL: CPT | Performed by: EMERGENCY MEDICINE

## 2022-03-25 PROCEDURE — 71045 X-RAY EXAM CHEST 1 VIEW: CPT

## 2022-03-25 PROCEDURE — 83605 ASSAY OF LACTIC ACID: CPT | Performed by: EMERGENCY MEDICINE

## 2022-03-25 PROCEDURE — 80053 COMPREHEN METABOLIC PANEL: CPT | Performed by: EMERGENCY MEDICINE

## 2022-03-25 PROCEDURE — 85610 PROTHROMBIN TIME: CPT | Performed by: EMERGENCY MEDICINE

## 2022-03-25 NOTE — TELEPHONE ENCOUNTER
Patient called the emergency line stating she delivered via  on 22  This morning she passed a "huge" clot the size of an "egg" and also notice heavier bleeding (changing the hospital pad within couple hours)  She also having cramps that are bad period cramps which she used a ice pack on her stomach currently  She is alternating Tylenol and Motrin as instructed which helped during the week but not this morning  She only goes up and down the stairs 2 x a day  She is currently breast-feeding  She also took her BP this morning which resulted 150/97 (denies headache, visual disturbance; she only notice feet and ankle swelling)  She wanted to know if it is concerning  Spoke with Dr Barrera Martínez, he advised pt to continue to monitor  If she pass another "Large" clot or bleeding heavier soaking/saturating completely a overnight maxi pad within an hour or si/sx of anemia to call back  As for the BP try to retake it again later today after 15-30 mins of resting for accurate measurement, if it continue to be elevated or develop a pounding headache/visual disturbance to please c/b  Advised pt for the cramping, please continue alternating the Tylenol and Motrin, use heating pad, or ice pack to ease the discomfort  Spoke with patient and advised of the Dr Vidal Bone recommendations  She scheduled for BP check next week with nurse 22  She verbalized understanding of the recommendations and no further questions

## 2022-03-26 ENCOUNTER — APPOINTMENT (INPATIENT)
Dept: CT IMAGING | Facility: HOSPITAL | Age: 37
DRG: 776 | End: 2022-03-26
Payer: COMMERCIAL

## 2022-03-26 ENCOUNTER — APPOINTMENT (EMERGENCY)
Dept: ULTRASOUND IMAGING | Facility: HOSPITAL | Age: 37
DRG: 776 | End: 2022-03-26
Payer: COMMERCIAL

## 2022-03-26 LAB
ATRIAL RATE: 104 BPM
BACTERIA UR QL AUTO: ABNORMAL /HPF
BASOPHILS # BLD AUTO: 0.03 THOUSANDS/ΜL (ref 0–0.1)
BASOPHILS # BLD AUTO: 0.03 THOUSANDS/ΜL (ref 0–0.1)
BASOPHILS NFR BLD AUTO: 0 % (ref 0–1)
BASOPHILS NFR BLD AUTO: 0 % (ref 0–1)
BILIRUB UR QL STRIP: NEGATIVE
CLARITY UR: ABNORMAL
COLOR UR: ABNORMAL
EOSINOPHIL # BLD AUTO: 0.01 THOUSAND/ΜL (ref 0–0.61)
EOSINOPHIL # BLD AUTO: 0.04 THOUSAND/ΜL (ref 0–0.61)
EOSINOPHIL NFR BLD AUTO: 0 % (ref 0–6)
EOSINOPHIL NFR BLD AUTO: 0 % (ref 0–6)
ERYTHROCYTE [DISTWIDTH] IN BLOOD BY AUTOMATED COUNT: 12.6 % (ref 11.6–15.1)
ERYTHROCYTE [DISTWIDTH] IN BLOOD BY AUTOMATED COUNT: 12.9 % (ref 11.6–15.1)
FLUAV RNA RESP QL NAA+PROBE: NEGATIVE
FLUBV RNA RESP QL NAA+PROBE: NEGATIVE
GLUCOSE UR STRIP-MCNC: NEGATIVE MG/DL
HCT VFR BLD AUTO: 29.9 % (ref 34.8–46.1)
HCT VFR BLD AUTO: 31.8 % (ref 34.8–46.1)
HGB BLD-MCNC: 10.2 G/DL (ref 11.5–15.4)
HGB BLD-MCNC: 10.6 G/DL (ref 11.5–15.4)
HGB UR QL STRIP.AUTO: ABNORMAL
IMM GRANULOCYTES # BLD AUTO: 0.08 THOUSAND/UL (ref 0–0.2)
IMM GRANULOCYTES # BLD AUTO: 0.09 THOUSAND/UL (ref 0–0.2)
IMM GRANULOCYTES NFR BLD AUTO: 1 % (ref 0–2)
IMM GRANULOCYTES NFR BLD AUTO: 1 % (ref 0–2)
KETONES UR STRIP-MCNC: NEGATIVE MG/DL
LACTATE SERPL-SCNC: 1.1 MMOL/L (ref 0.5–2)
LEUKOCYTE ESTERASE UR QL STRIP: ABNORMAL
LYMPHOCYTES # BLD AUTO: 0.5 THOUSANDS/ΜL (ref 0.6–4.47)
LYMPHOCYTES # BLD AUTO: 0.61 THOUSANDS/ΜL (ref 0.6–4.47)
LYMPHOCYTES NFR BLD AUTO: 4 % (ref 14–44)
LYMPHOCYTES NFR BLD AUTO: 4 % (ref 14–44)
MCH RBC QN AUTO: 29.7 PG (ref 26.8–34.3)
MCH RBC QN AUTO: 30 PG (ref 26.8–34.3)
MCHC RBC AUTO-ENTMCNC: 33.3 G/DL (ref 31.4–37.4)
MCHC RBC AUTO-ENTMCNC: 34.1 G/DL (ref 31.4–37.4)
MCV RBC AUTO: 87 FL (ref 82–98)
MCV RBC AUTO: 90 FL (ref 82–98)
MONOCYTES # BLD AUTO: 0.57 THOUSAND/ΜL (ref 0.17–1.22)
MONOCYTES # BLD AUTO: 0.57 THOUSAND/ΜL (ref 0.17–1.22)
MONOCYTES NFR BLD AUTO: 4 % (ref 4–12)
MONOCYTES NFR BLD AUTO: 5 % (ref 4–12)
NEUTROPHILS # BLD AUTO: 10.56 THOUSANDS/ΜL (ref 1.85–7.62)
NEUTROPHILS # BLD AUTO: 13.34 THOUSANDS/ΜL (ref 1.85–7.62)
NEUTS SEG NFR BLD AUTO: 90 % (ref 43–75)
NEUTS SEG NFR BLD AUTO: 91 % (ref 43–75)
NITRITE UR QL STRIP: NEGATIVE
NON-SQ EPI CELLS URNS QL MICRO: ABNORMAL /HPF
NRBC BLD AUTO-RTO: 0 /100 WBCS
NRBC BLD AUTO-RTO: 0 /100 WBCS
P AXIS: 50 DEGREES
PH UR STRIP.AUTO: 6.5 [PH]
PLATELET # BLD AUTO: 209 THOUSANDS/UL (ref 149–390)
PLATELET # BLD AUTO: 227 THOUSANDS/UL (ref 149–390)
PMV BLD AUTO: 10.5 FL (ref 8.9–12.7)
PMV BLD AUTO: 10.5 FL (ref 8.9–12.7)
PR INTERVAL: 118 MS
PROCALCITONIN SERPL-MCNC: 0.22 NG/ML
PROT UR STRIP-MCNC: ABNORMAL MG/DL
QRS AXIS: 31 DEGREES
QRSD INTERVAL: 80 MS
QT INTERVAL: 334 MS
QTC INTERVAL: 439 MS
RBC # BLD AUTO: 3.44 MILLION/UL (ref 3.81–5.12)
RBC # BLD AUTO: 3.53 MILLION/UL (ref 3.81–5.12)
RBC #/AREA URNS AUTO: ABNORMAL /HPF
RSV RNA RESP QL NAA+PROBE: NEGATIVE
SARS-COV-2 RNA RESP QL NAA+PROBE: NEGATIVE
SP GR UR STRIP.AUTO: <=1.005 (ref 1–1.03)
T WAVE AXIS: 9 DEGREES
UROBILINOGEN UR QL STRIP.AUTO: 0.2 E.U./DL
VENTRICULAR RATE: 104 BPM
WBC # BLD AUTO: 11.76 THOUSAND/UL (ref 4.31–10.16)
WBC # BLD AUTO: 14.67 THOUSAND/UL (ref 4.31–10.16)
WBC #/AREA URNS AUTO: ABNORMAL /HPF

## 2022-03-26 PROCEDURE — NC001 PR NO CHARGE: Performed by: OBSTETRICS & GYNECOLOGY

## 2022-03-26 PROCEDURE — 96374 THER/PROPH/DIAG INJ IV PUSH: CPT

## 2022-03-26 PROCEDURE — 99232 SBSQ HOSP IP/OBS MODERATE 35: CPT | Performed by: STUDENT IN AN ORGANIZED HEALTH CARE EDUCATION/TRAINING PROGRAM

## 2022-03-26 PROCEDURE — 74176 CT ABD & PELVIS W/O CONTRAST: CPT

## 2022-03-26 PROCEDURE — G1004 CDSM NDSC: HCPCS

## 2022-03-26 PROCEDURE — 93010 ELECTROCARDIOGRAM REPORT: CPT | Performed by: INTERNAL MEDICINE

## 2022-03-26 PROCEDURE — 87086 URINE CULTURE/COLONY COUNT: CPT | Performed by: EMERGENCY MEDICINE

## 2022-03-26 PROCEDURE — 0241U HB NFCT DS VIR RESP RNA 4 TRGT: CPT | Performed by: OBSTETRICS & GYNECOLOGY

## 2022-03-26 PROCEDURE — 76856 US EXAM PELVIC COMPLETE: CPT

## 2022-03-26 PROCEDURE — 81001 URINALYSIS AUTO W/SCOPE: CPT | Performed by: EMERGENCY MEDICINE

## 2022-03-26 PROCEDURE — 76830 TRANSVAGINAL US NON-OB: CPT

## 2022-03-26 PROCEDURE — 85025 COMPLETE CBC W/AUTO DIFF WBC: CPT | Performed by: OBSTETRICS & GYNECOLOGY

## 2022-03-26 RX ORDER — SODIUM CHLORIDE, SODIUM LACTATE, POTASSIUM CHLORIDE, CALCIUM CHLORIDE 600; 310; 30; 20 MG/100ML; MG/100ML; MG/100ML; MG/100ML
125 INJECTION, SOLUTION INTRAVENOUS CONTINUOUS
Status: DISCONTINUED | OUTPATIENT
Start: 2022-03-26 | End: 2022-03-28 | Stop reason: HOSPADM

## 2022-03-26 RX ORDER — ACETAMINOPHEN 325 MG/1
975 TABLET ORAL EVERY 8 HOURS PRN
Status: DISCONTINUED | OUTPATIENT
Start: 2022-03-26 | End: 2022-03-28 | Stop reason: HOSPADM

## 2022-03-26 RX ORDER — ACETAMINOPHEN 325 MG/1
650 TABLET ORAL ONCE
Status: COMPLETED | OUTPATIENT
Start: 2022-03-26 | End: 2022-03-26

## 2022-03-26 RX ORDER — IBUPROFEN 600 MG/1
600 TABLET ORAL EVERY 6 HOURS PRN
Status: DISCONTINUED | OUTPATIENT
Start: 2022-03-26 | End: 2022-03-28 | Stop reason: HOSPADM

## 2022-03-26 RX ORDER — IBUPROFEN 600 MG/1
600 TABLET ORAL EVERY 6 HOURS PRN
Status: CANCELLED | OUTPATIENT
Start: 2022-03-26

## 2022-03-26 RX ADMIN — AMPICILLIN SODIUM AND SULBACTAM SODIUM 3 G: 2; 1 INJECTION, POWDER, FOR SOLUTION INTRAMUSCULAR; INTRAVENOUS at 13:15

## 2022-03-26 RX ADMIN — AMPICILLIN SODIUM AND SULBACTAM SODIUM 3 G: 2; 1 INJECTION, POWDER, FOR SOLUTION INTRAMUSCULAR; INTRAVENOUS at 19:18

## 2022-03-26 RX ADMIN — ACETAMINOPHEN 975 MG: 325 TABLET ORAL at 12:31

## 2022-03-26 RX ADMIN — SODIUM CHLORIDE, SODIUM LACTATE, POTASSIUM CHLORIDE, AND CALCIUM CHLORIDE 125 ML/HR: .6; .31; .03; .02 INJECTION, SOLUTION INTRAVENOUS at 22:26

## 2022-03-26 RX ADMIN — ENOXAPARIN SODIUM 40 MG: 100 INJECTION SUBCUTANEOUS at 11:08

## 2022-03-26 RX ADMIN — ENOXAPARIN SODIUM 40 MG: 100 INJECTION SUBCUTANEOUS at 21:08

## 2022-03-26 RX ADMIN — IBUPROFEN 600 MG: 600 TABLET ORAL at 16:20

## 2022-03-26 RX ADMIN — ACETAMINOPHEN 975 MG: 325 TABLET ORAL at 21:08

## 2022-03-26 RX ADMIN — AMPICILLIN SODIUM AND SULBACTAM SODIUM 3 G: 2; 1 INJECTION, POWDER, FOR SOLUTION INTRAMUSCULAR; INTRAVENOUS at 07:46

## 2022-03-26 RX ADMIN — AMPICILLIN SODIUM AND SULBACTAM SODIUM 3 G: 2; 1 INJECTION, POWDER, FOR SOLUTION INTRAMUSCULAR; INTRAVENOUS at 01:28

## 2022-03-26 RX ADMIN — ACETAMINOPHEN 650 MG: 325 TABLET ORAL at 01:43

## 2022-03-26 RX ADMIN — SODIUM CHLORIDE, SODIUM LACTATE, POTASSIUM CHLORIDE, AND CALCIUM CHLORIDE 125 ML/HR: .6; .31; .03; .02 INJECTION, SOLUTION INTRAVENOUS at 13:16

## 2022-03-26 RX ADMIN — IBUPROFEN 600 MG: 600 TABLET ORAL at 05:15

## 2022-03-26 RX ADMIN — SODIUM CHLORIDE, SODIUM LACTATE, POTASSIUM CHLORIDE, AND CALCIUM CHLORIDE 125 ML/HR: .6; .31; .03; .02 INJECTION, SOLUTION INTRAVENOUS at 04:04

## 2022-03-26 RX ADMIN — IBUPROFEN 600 MG: 600 TABLET ORAL at 10:47

## 2022-03-26 NOTE — TELEPHONE ENCOUNTER
Patient called stating she took her Temperature 10 minutes before the call after having chills and it was 101 1  Patient's previous Temperature earlier in the day was 99  Pt states she has respiratory congestion but it is her "normal congestion"  Admits to having dysuria, stating it is only due to voiding when her bladder is really full  Denies having increased frequency  Patient is breastfeeding but denies having breast tenderness or erythema  Pt states she called the office this morning after having passed a clot but denies having any increase in vaginal bleeding since discharge on post-partum day # 2 (3/23/22) after  on 3/21/2022  Advised patient to present to ER for evaluation

## 2022-03-26 NOTE — H&P
History and Physical    DOS:  3/26/22                                                                Location:   ED 9    Dhara Thomson is a 39 y o  [id number] who is PPD #5 s/p   She presents with 1 day history of fever to 102 F, increased pelvic pain, myalgia, fatigue, increased in lochia  Past Medical History:   Diagnosis Date    Acid reflux     Bicuspid aortic valve     COVID-19 affecting pregnancy in second trimester 2021    Depression 2004    in  after termination of pregnancy  was tx with zoloft     Diabetes mellitus ([address] )     prediabetic    Gestational hypertension 2011    third trimester with her 1st baby  del @ 38 weeks    High-risk pregnancy     HX DVT (deep venous thrombosis) (HCC)     in right axilla -- complication from thoracic outlet syndrome    PCOS (polycystic ovarian syndrome)     initally diagnosed in  tx with metformin  pt d/c med and then restarted in     Pituitary microadenoma [facility])     last MRI in     Prediabetes 2016    'impaired fasting glucose'     Thoracic outlet syndrome 2004    in  per [facility] [place] records; has surgical repair     Past Surgical History:   Procedure Laterality Date    BONE RESECTION, RIB Right      Past OB/Gyn History:   3/21 baby girl  Pregnancy complicated by gestational hypertension  Currently breast feeding/ pumping  History reviewed  No pertinent family history    Social History:  Social History     Socioeconomic History    Marital status: Registered Domestic Partner     Spouse name: Not on file    Number of children: Not on file    Years of education: Not on file    Highest education level: Not on file   Occupational History    Not on file   Tobacco Use    Smoking status: Never Smoker    Smokeless tobacco: Never Used   Vaping Use    Vaping Use: Never used   Substance and Sexual Activity    Alcohol use: Never    Drug use: Never    Sexual activity: Yes     Partners: Male   Other Topics Concern    Not on file   Social History Narrative    Not on file     Social Determinants of Health     Financial Resource Strain: Not on file   Food Insecurity: Not on file   Transportation Needs: Not on file   Physical Activity: Not on file   Stress: Not on file   Social Connections: Not on file   Intimate Partner Violence: Not on file   Housing Stability: Not on file     Allergies   Allergen Reactions    Gadobutrol Cough and Itching    Iv Contrast [Iodinated Diagnostic Agents] Shortness Of Breath and Throat Swelling     Pt stated this was from 'years and years' ago and has not had it since    Nuts - Food Allergy Anaphylaxis    Shellfish-Derived Products - Food Allergy Anaphylaxis    Shrimp Extract Allergy Skin Test - Food Allergy Headache    Venlafaxine Anaphylaxis     Per [employer] records    Hydromorphone Itching     During labor had reaction    Morphine Itching    Peanut Oil - Food Allergy Other (See Comments)     Itching, trouble breathing       Current Facility-Administered Medications:     ampicillin-sulbactam (UNASYN) in sodium chloride 0 9% 3 g, 3 g, Intravenous, Q6H, Rodrick Hollis MD    Current Outpatient Medications:     acetaminophen (TYLENOL) 325 mg tablet, Take 2 tablets (650 mg total) by mouth every 4 (four) hours as needed for mild pain, Disp: , Rfl: 0    ibuprofen (MOTRIN) 600 mg tablet, Take 1 tablet (600 mg total) by mouth every 6 (six) hours as needed for mild pain, Disp: 30 tablet, Rfl: 0    levothyroxine (Synthroid) 75 mcg tablet, Take 1 tablet (75 mcg total) by mouth daily, Disp: 30 tablet, Rfl: 3    omeprazole (PriLOSEC) 20 mg delayed release capsule, Take 20 mg by mouth, Disp: , Rfl:     Prenatal Vit-Fe Fumarate-FA (PRENATAL 1+1 PO), Take 1 tablet by mouth daily, Disp: , Rfl:     witch hazel-glycerin (TUCKS) topical pad, Apply 1 pad topically every 4 (four) hours as needed for irritation, Disp: , Rfl: 0    docusate sodium (COLACE) 100 mg capsule, Take 1 capsule (100 mg total) by mouth 2 (two) times a day as needed for constipation (Patient not taking: Reported on 3/25/2022 ), Disp: , Rfl: 0    EPINEPHrine (EPIPEN) 0 3 mg/0 3 mL SOAJ, Inject 0 3 mg into a muscle every 8 (eight) hours as needed for anaphylaxis, Disp: , Rfl:     Ferrous Sulfate ER (Slow Fe) 142 (45 Fe) MG TBCR, Take 1 tablet by mouth in the morning (Patient not taking: Reported on 3/25/2022 ), Disp: , Rfl: 0    Review of Systems:  A complete review of systems was performed and was negative, except as listed  Physical Exam:  /66   Pulse 97   Temp 99 9 °F (37 7 °C) (Temporal)   Resp 20   Ht 5' 5" (1 651 m)   Wt 91 2 kg (201 lb)   LMP 2021 (Exact Date)   SpO2 97%   BMI 33 45 kg/m²   GEN: The patient was alert and oriented x3, pleasant well-appearing female in no acute distress  BREAST:  No erythema or tenderness  ABD:  Soft, fundus tender to palpation and 3 below U  EXT:  WWP, nontender, no edema  BACK:  No CVA tenderness  PELVIC:  Spec exam with small amount dark red lochia  Tender on fundus on bimanual exam      Lab Results   Component Value Date    WBC 14 67 (H) 2022    HGB 10 6 (L) 2022    HCT 31 8 (L) 2022    MCV 90 2022     2022     Lab Results   Component Value Date    SODIUM 138 2022    K 3 5 2022     2022    CO2 22 2022    AGAP 13 2022    BUN 9 2022    CREATININE 0 70 2022    GLUC 112 2022    CALCIUM 8 2 (L) 2022    AST 21 2022    ALT 27 2022    ALKPHOS 106 2022    TP 6 3 (L) 2022    TBILI 0 40 2022    EGFR 111 2022     Pelvic Ultrasound  IMPRESSION:     Enlarged uterus which correlates with recent       Normal caliber of the endometrium with a heterogeneous appearance; consider endometrial blood products versus retained products of conception; endometritis also considered in the appropriate clinical setting     Correlation with the patient's symptoms,   physical exam and laboratory values recommended     Normal appearance of the bilateral ovaries  Assessment & Plan: Pepe Thomson is a 39 y o  [id number] PPD #5 with postpartum endometritis  1  Admit for inpatient management  2  Unasyn 3g IV q 6hr  3  Blood cultures, urinalysis, covid test pending

## 2022-03-26 NOTE — PROGRESS NOTES
Asked to see patient bedside for increased temp with shaking chills, and elevated BP and heart rate and new onset intense RLQ pain  Tmax 103 1     /101    Pt states RLQ pain intermittent for less than one hour  Has had loss of appetite and nausea since admit  Abdomen- soft, tender t/o, but more at RLQ and some at fundus  No guarding, no distention, no rebound  Ext no edema stephanie's sherry' signs  Heart RRR at 105  Pt with obvious shaking chills    Pt had just received tylenol, had ibuprofen 90 minutes prior  Has had 2 doses unasyn, due for 3rd dose 2 pm    WBC down this AM from last PM  14 6 to 11 7, plt wnl, last PM with negative lactic acid and procalcitonin  Blankets removed from pt to help decrease temp without need for cooling blanket, explained to pt    I discussed with radiologist how best to image appendix as pt with true allergy to contrast dye  He recommends CT  US from last PM reviewed but appendix not visualized then    Pt seen again and BP and HR improved, pt no longer with shaking chills, temp down to 101 3    Feeling improved    Will await CT results, continue abx (less than 24 hours so far), watch closely

## 2022-03-26 NOTE — PLAN OF CARE
Problem: POSTPARTUM  Goal: Experiences normal postpartum course  Description: INTERVENTIONS:  - Monitor maternal vital signs  - Assess uterine involution and lochia  Outcome: Progressing  Goal: Appropriate maternal -  bonding  Description: INTERVENTIONS:  - Identify family support  - Assess for appropriate maternal/infant bonding   -Encourage maternal/infant bonding opportunities  - Referral to  or  as needed  Outcome: Completed  Goal: Establishment of infant feeding pattern  Description: INTERVENTIONS:  - Assess breast/bottle feeding  - Refer to lactation as needed  Outcome: Completed  Goal: Incision(s), wounds(s) or drain site(s) healing without S/S of infection  Description: INTERVENTIONS  - Assess and document dressing, incision, wound bed, drain sites and surrounding tissue  - Provide patient and family education  Outcome: Progressing

## 2022-03-26 NOTE — ED PROVIDER NOTES
History  Chief Complaint   Patient presents with    Fever - 9 weeks to 74 years     Pt  had vaginal delivery 3/21/22, developed fever today, was 103 and had taken 600mg po motrin @ 2200  Pt denies n/v/d  Pt reports +clots, light bleeding  39-year-old female status post spontaneous uncomplicated vaginal delivery of 37 week baby on  presents for evaluation of moderate lower abdominal pain and associated fevers to 102 at home  The patient has been taking Tylenol and Motrin with incomplete reduction of fever  The patient has associated body aches  She denies nausea or vomiting  Patient denies any trouble bowel or bladder movements  The patient states that she is passing a egg sized blood clots  She contacted [place] samira RODRIGUEZ who advised her to come to the emergency department for further evaluation      Fever - 9 weeks to 74 years  Associated symptoms: chills and myalgias        Prior to Admission Medications   Prescriptions Last Dose Informant Patient Reported? Taking?    EPINEPHrine (EPIPEN) 0 3 mg/0 3 mL SOAJ More than a month at Unknown time Self Yes No   Sig: Inject 0 3 mg into a muscle every 8 (eight) hours as needed for anaphylaxis   Ferrous Sulfate ER (Slow Fe) 142 (45 Fe) MG TBCR Not Taking at Unknown time  No No   Sig: Take 1 tablet by mouth in the morning   Patient not taking: Reported on 3/25/2022    Prenatal Vit-Fe Fumarate-FA (PRENATAL 1+1 PO) 3/25/2022 at Unknown time Self Yes Yes   Sig: Take 1 tablet by mouth daily   acetaminophen (TYLENOL) 325 mg tablet 3/25/2022 at Unknown time  No Yes   Sig: Take 2 tablets (650 mg total) by mouth every 4 (four) hours as needed for mild pain   docusate sodium (COLACE) 100 mg capsule Not Taking at Unknown time  No No   Sig: Take 1 capsule (100 mg total) by mouth 2 (two) times a day as needed for constipation   Patient not taking: Reported on 3/25/2022    ibuprofen (MOTRIN) 600 mg tablet 3/25/2022 at Unknown time  No Yes   Sig: Take 1 tablet (600 mg total) by mouth every 6 (six) hours as needed for mild pain   levothyroxine (Synthroid) 75 mcg tablet 3/25/2022 at Unknown time Self No Yes   Sig: Take 1 tablet (75 mcg total) by mouth daily   omeprazole (PriLOSEC) 20 mg delayed release capsule 3/25/2022 at Unknown time Self Yes Yes   Sig: Take 20 mg by mouth   witch hazel-glycerin (TUCKS) topical pad 3/25/2022 at Unknown time  No Yes   Sig: Apply 1 pad topically every 4 (four) hours as needed for irritation      Facility-Administered Medications: None       Past Medical History:   Diagnosis Date    Acid reflux     Bicuspid aortic valve     COVID-19 affecting pregnancy in second trimester 12/12/2021    Depression 2004    in 2004 after termination of pregnancy  was tx with zoloft     Diabetes mellitus ([address] )     prediabetic    Gestational hypertension 2011    third trimester with her 1st baby  del @ 38 weeks    High-risk pregnancy     HX DVT (deep venous thrombosis) (HCC)     in right axilla -- complication from thoracic outlet syndrome    PCOS (polycystic ovarian syndrome) 2010    initally diagnosed in 2010 tx with metformin  pt d/c med and then restarted in 2016    Pituitary microadenoma [facility]) 2009    last MRI in 2009    Prediabetes 2016    'impaired fasting glucose'     Thoracic outlet syndrome 2004    in 2004 [facility]; has surgical repair       Past Surgical History:   Procedure Laterality Date    BONE RESECTION, RIB Right 2004       History reviewed  No pertinent family history  I have reviewed and agree with the history as documented      E-Cigarette/Vaping    E-Cigarette Use Never User      E-Cigarette/Vaping Substances    Nicotine No     THC No     CBD Yes before pregnancy for anxiety    Flavoring No      Social History     Tobacco Use    Smoking status: Never Smoker    Smokeless tobacco: Never Used   Vaping Use    Vaping Use: Never used   Substance Use Topics    Alcohol use: Never    Drug use: Never       Review of Systems   Constitutional: Positive for chills, fatigue and fever  Genitourinary: Positive for pelvic pain and vaginal bleeding  Musculoskeletal: Positive for myalgias  All other systems reviewed and are negative  Physical Exam  Physical Exam  Vitals and nursing note reviewed  Constitutional:       General: She is not in acute distress  Appearance: She is well-developed  HENT:      Head: Normocephalic and atraumatic  Right Ear: External ear normal       Left Ear: External ear normal    Eyes:      General: No scleral icterus  Conjunctiva/sclera: Conjunctivae normal       Pupils: Pupils are equal, round, and reactive to light  Cardiovascular:      Rate and Rhythm: Regular rhythm  Tachycardia present  Heart sounds: Normal heart sounds  Pulmonary:      Effort: Pulmonary effort is normal  No respiratory distress  Breath sounds: Normal breath sounds  Abdominal:      General: Bowel sounds are normal       Palpations: Abdomen is soft  Tenderness: There is abdominal tenderness ( Lower)  There is no guarding or rebound  Musculoskeletal:         General: Normal range of motion  Cervical back: Normal range of motion  Skin:     General: Skin is warm and dry  Findings: No rash  Neurological:      Mental Status: She is alert and oriented to person, place, and time           Vital Signs  ED Triage Vitals [03/25/22 2307]   Temperature Pulse Respirations Blood Pressure SpO2   99 9 °F (37 7 °C) (!) 110 22 147/74 96 %      Temp Source Heart Rate Source Patient Position - Orthostatic VS BP Location FiO2 (%)   Temporal Monitor Sitting Left arm --      Pain Score       8           Vitals:    03/25/22 2345 03/26/22 0015 03/26/22 0115 03/26/22 0145   BP:    107/66   Pulse: 99 93 97 97   Patient Position - Orthostatic VS:             Visual Acuity      ED Medications  Medications   ampicillin-sulbactam (UNASYN) in sodium chloride 0 9% 3 g (3 g Intravenous Given 3/26/22 0128) acetaminophen (TYLENOL) tablet 650 mg (650 mg Oral Given 3/26/22 0143)       Diagnostic Studies  Results Reviewed     Procedure Component Value Units Date/Time    CBC and differential [[id number]]  (Abnormal) Collected: 03/25/22 2324    Lab Status: Final result Specimen: Blood from Arm, Right Updated: 03/26/22 0040     WBC 14 67 Thousand/uL      RBC 3 53 Million/uL      Hemoglobin 10 6 g/dL      Hematocrit 31 8 %      MCV 90 fL      MCH 30 0 pg      MCHC 33 3 g/dL      RDW 12 9 %      MPV 10 5 fL      Platelets 656 Thousands/uL      nRBC 0 /100 WBCs      Neutrophils Relative 91 %      Immat GRANS % 1 %      Lymphocytes Relative 4 %      Monocytes Relative 4 %      Eosinophils Relative 0 %      Basophils Relative 0 %      Neutrophils Absolute 13 34 Thousands/µL      Immature Grans Absolute 0 08 Thousand/uL      Lymphocytes Absolute 0 61 Thousands/µL      Monocytes Absolute 0 57 Thousand/µL      Eosinophils Absolute 0 04 Thousand/µL      Basophils Absolute 0 03 Thousands/µL     Narrative: This is an appended report  These results have been appended to a previously verified report  Procalcitonin [[id number]]  (Normal) Collected: 03/25/22 2324    Lab Status: Final result Specimen: Blood from Arm, Right Updated: 03/26/22 0003     Procalcitonin 0 22 ng/ml     Lactic acid [[id number]]  (Normal) Collected: 03/25/22 2324    Lab Status: Final result Specimen: Blood from Arm, Right Updated: 03/26/22 0001     LACTIC ACID 1 1 mmol/L     Narrative:      Result may be elevated if tourniquet was used during collection      Comprehensive metabolic panel [[id number]]  (Abnormal) Collected: 03/25/22 2324    Lab Status: Final result Specimen: Blood from Arm, Right Updated: 03/25/22 2354     Sodium 138 mmol/L      Potassium 3 5 mmol/L      Chloride 103 mmol/L      CO2 22 mmol/L      ANION GAP 13 mmol/L      BUN 9 mg/dL      Creatinine 0 70 mg/dL      Glucose 112 mg/dL      Calcium 8 2 mg/dL      Corrected Calcium 9 5 mg/dL AST 21 U/L      ALT 27 U/L      Alkaline Phosphatase 106 U/L      Total Protein 6 3 g/dL      Albumin 2 4 g/dL      Total Bilirubin 0 40 mg/dL      eGFR 111 ml/min/1 73sq m     Narrative:      [place] guidelines for Chronic Kidney Disease (CKD):     Stage 1 with normal or high GFR (GFR > 90 mL/min/1 73 square meters)    Stage 2 Mild CKD (GFR = 60-89 mL/min/1 73 square meters)    Stage 3A Moderate CKD (GFR = 45-59 mL/min/1 73 square meters)    Stage 3B Moderate CKD (GFR = 30-44 mL/min/1 73 square meters)    Stage 4 Severe CKD (GFR = 15-29 mL/min/1 73 square meters)    Stage 5 End Stage CKD (GFR <15 mL/min/1 73 square meters)  Note: GFR calculation is accurate only with a steady state creatinine    Protime-INR [[id number]]  (Normal) Collected: 22    Lab Status: Final result Specimen: Blood from Arm, Right Updated: 22     Protime 13 5 seconds      INR 1 04    APTT [[id number]]  (Normal) Collected: 22    Lab Status: Final result Specimen: Blood from Arm, Right Updated: 22     PTT 30 seconds     Blood culture #2 [[id number]] Collected: 22    Lab Status: In process Specimen: Blood from Arm, Right Updated: 22    Blood culture #1 [[id number]] Collected: 22    Lab Status: In process Specimen: Blood from Arm, Right Updated: 22    UA w Reflex to Microscopic w Reflex to Culture [[id number]]     Lab Status: No result Specimen: Urine                  US pelvis complete w transvaginal   Final Result by Osmel Browne DO ( [id number])       Enlarged uterus which correlates with recent   Normal caliber of the endometrium with a heterogeneous appearance; consider endometrial blood products versus retained products of conception; endometritis also considered in the appropriate clinical setting     Correlation with the patient's symptoms,    physical exam and laboratory values recommended Normal appearance of the bilateral ovaries  Other findings as above  Workstation performed: [id number]         XR chest portable   ED Interpretation by Nichole Storm DO (03/26 0007)   ED Provider X-ray Interpretation    My X-ray interpretation of the Chest: was negative for infiltrate, effusion, pneumothorax, or wide mediastinum    Nichole Storm DO      Final Result by Therese Cohen MD (03/26 0030)      No acute cardiopulmonary disease  Findings concur with the preliminary report by the referring clinician already in PACS and/or our electronic record EPIC  Workstation performed: [id number]                    Procedures  ECG 12 Lead Documentation Only    Date/Time: 3/25/2022 11:24 PM  Performed by: Nichole Storm DO  Authorized by: Nichole Storm DO     Indications / Diagnosis:  Fever  ECG reviewed by me, the ED Provider: yes    Patient location:  ED  Previous ECG:     Previous ECG:  Compared to current    Comparison ECG info:  3/15/2022 nonspecific T-wave abnormalities resolved    Similarity:  Changes noted  Interpretation:     Interpretation: normal    Rate:     ECG rate:  104    ECG rate assessment: tachycardic    Rhythm:     Rhythm: sinus rhythm    Ectopy:     Ectopy: none    QRS:     QRS axis:  Normal    QRS intervals:  Normal  Conduction:     Conduction: normal    ST segments:     ST segments:  Normal  T waves:     T waves: normal               ED Course  ED Course as of 03/26/22 0245   Sat Mar 26, 2022   Tristin Infante [id number] OB/GYN paged   0108 D/W OB GYN who advises admit to GYN for endometritis, pelvic US and unasyn coverage   0111 US order placed and US tech messaged                               SBIRT 22yo+      Most Recent Value   SBIRT (23 yo +)    In order to provide better care to our patients, we are screening all of our patients for alcohol and drug use  Would it be okay to ask you these screening questions?  Yes Filed at: 03/25/2022 9450   Initial Alcohol Screen: US AUDIT-C 1  How often do you have a drink containing alcohol? 0 Filed at: 03/25/2022 2309   2  How many drinks containing alcohol do you have on a typical day you are drinking? 0 Filed at: 03/25/2022 2309   3a  Male UNDER 65: How often do you have five or more drinks on one occasion? 0 Filed at: 03/25/2022 2309   3b  FEMALE Any Age, or MALE 65+: How often do you have 4 or more drinks on one occassion? 0 Filed at: 03/25/2022 2309   Audit-C Score 0 Filed at: 03/25/2022 2309   ROMARIO: How many times in the past year have you    Used an illegal drug or used a prescription medication for non-medical reasons?  Never Filed at: 03/25/2022 2309                    MDM  Number of Diagnoses or Management Options  Endometritis: new and requires workup  Pyrexia of unknown origin following delivery  Diagnosis management comments: Differential diagnosis:  Urinary tract infection, pyelonephritis, endometritis, retained product of conception, postpartum pain, other  Plan for laboratories, ultrasound and gyn consultation    Diagnostics reviewed and discussed with gynecology with plan for admission and IV antibiotic coverage       Amount and/or Complexity of Data Reviewed  Clinical lab tests: reviewed  Tests in the radiology section of CPT®: reviewed  Review and summarize past medical records: yes (Recent hospitalization notes reviewed with uncomplicated spontaneous vaginal delivery)  Discuss the patient with other providers: yes  Independent visualization of images, tracings, or specimens: yes        Disposition  Final diagnoses:   Pyrexia of unknown origin following delivery   Endometritis     Time reflects when diagnosis was documented in both MDM as applicable and the Disposition within this note     Time User Action Codes Description Comment    3/26/2022 12:12 AM Gina JEROME Add [O86 4] Pyrexia of unknown origin following delivery     3/26/2022  1:09 AM Gina JEROME Add [N71 9] Endometritis       ED Disposition     ED Disposition Condition Date/Time Comment    Admit Stable Sat Mar 26, 2022  1:55 AM Case was discussed with Dr Demetria Severance and the patient's admission status was agreed to be Admission Status: inpatient status to the service of Dr Prabhjot Fulton   Follow-up Information    None         Patient's Medications   Discharge Prescriptions    No medications on file       No discharge procedures on file      PDMP Review     None          ED Provider  Electronically Signed by           Nikhil Shaw DO  03/26/22 0245

## 2022-03-26 NOTE — PROGRESS NOTES
Progress Note - OB/GYN  Post-Partum Physician Note   Hadley Thomson 39 y o  female MRN: [id number]  Unit/Bed#: -01 Encounter: [id number]    Assessment:  39y o  year-old , postpartum day #5 s/p , now HD#2 following readmission for postpartum endometritis  Plan:     Postpartum endometritis  - Diagnosed based on fever to 102 F, fundal tenderness, and leukocytosis  Questionable left-sided CVA tenderness on exam this morning, but no other evidence of UTI  Pyelonephritis felt to be less likely  No evidence of respiratory infection, mastitis, or VTE   - Consideration has been given to possibility of retained products of conception, for which suspicion is currently low  Pelvic ultrasound images reviewed along with Radiologist's report  Overall unremarkable appearance of the endometrial cavity, with no focal thickening or evidence of vascularized retained POC  Patient's delivery note reviewed  It does not appear that there was concern for trailing membranes or retained tissue at time of placental inspection  Per note, bimanual exam was performed with no concern for retained membranes  - Will continue Unasyn 3 g IV q6h until afebrile > 24 hours and with clinical improvement, per [facility]    - TMax 102 9 F at 06:16 this morning  TLast 100 7 F at 06:39 this morning  Routine vitals  - Leukocytosis down trending, with WBC 14 67 at time of admission -> 11 76 this morning  Repeat CBC ordered for tomorrow AM   - Blood cultures x 2 and urine culture pending    - Continue to trend fever curve and leukocytosis  History of DVT (deep vein thrombosis)  - Will start Lovenox ppx while inpatient, given increased risk of VTE in setting of infection  Has declined outpatient postpartum Lovenox, but agreeable to administration while in-house  - SCDs while in bed      _________________________________    Subjective: Patient reports that she feels overall improved from time of admission   Reports continued abdominal and back pain  Bleeding stable  Denies dysuria, urinary urgency/frequency  Pain: Well controlled  Tolerating Oral Intake: Yes  Voiding: Yes  Ambulating: Yes  Breastfeeding: Yes  Chest Pain: No  Shortness of Breath: No  Leg Pain/Discomfort: No  Lochia: Now normal    Objective:   Vitals:    03/26/22 0421 03/26/22 0616 03/26/22 0639 03/26/22 0700   BP: 130/94   129/76   BP Location: Left arm   Right arm   Pulse: 97   100   Resp: 22   20   Temp: 98 °F (36 7 °C) (!) 102 9 °F (39 4 °C) (!) 100 7 °F (38 2 °C) 98 5 °F (36 9 °C)   TempSrc: Oral Oral Oral Oral   SpO2: 98%   98%   Weight:       Height:           Intake/Output Summary (Last 24 hours) at 3/26/2022 1138  Last data filed at 3/26/2022 0901  Gross per 24 hour   Intake --   Output 800 ml   Net -800 ml       Physical Exam:  General: in no apparent distress, alert, oriented times 3, afebrile and cooperative  Abdomen: Abdomen is soft without masses, organomegaly or guarding  Back: ?left CVA tenderness  No right CVA tenderness  Fundus: Firm, 1 below the umbilicus  Mild fundal tenderness persists  Lower extremeties: non-tender, no edema, no calf tenderness  No evidence of DVT       Labs/Tests:   Lab Results   Component Value Date/Time    HGB 10 2 (L) 03/26/2022 06:13 AM    HGB 10 6 (L) 03/25/2022 11:24 PM     03/26/2022 06:13 AM     03/25/2022 11:24 PM    WBC 11 76 (H) 03/26/2022 06:13 AM    WBC 14 67 (H) 03/25/2022 11:24 PM    CREATININE 0 70 03/25/2022 11:24 PM    ALT 27 03/25/2022 11:24 PM    AST 21 03/25/2022 11:24 PM    PTT 30 03/25/2022 11:24 PM    INR 1 04 03/25/2022 11:24 PM        Brief OB Lab review:  ABO Grouping   Date Value Ref Range Status   03/21/2022 A  Final      Rh Factor   Date Value Ref Range Status   03/21/2022 Positive  Final    No results found for: ANTIBODYSCR  No results found for: RUBM    MEDS:   Current Facility-Administered Medications   Medication Dose Route Frequency    acetaminophen (TYLENOL) tablet 975 mg  975 mg Oral Q8H PRN    ampicillin-sulbactam (UNASYN) in sodium chloride 0 9% 3 g  3 g Intravenous Q6H    enoxaparin (LOVENOX) subcutaneous injection 40 mg  40 mg Subcutaneous Q12H CHARO    ibuprofen (MOTRIN) tablet 600 mg  600 mg Oral Q6H PRN    lactated ringers infusion  125 mL/hr Intravenous Continuous     Invasive Devices  Report    Peripheral Intravenous Line            Peripheral IV 03/26/22 Right;Ventral (anterior) Forearm <1 day                  Susan Fowler MD  3/26/2022 11:38 AM

## 2022-03-26 NOTE — PROGRESS NOTES
Dr Rush Friends aware of patient temp, chills, shivering, bp  Patient given 975mg of tylenol  Will hang abx when it's time  Dr Rush Friends to order ct  Scan and will keep dr Roshni Crowell updated

## 2022-03-26 NOTE — ASSESSMENT & PLAN NOTE
- Will continue Lovenox ppx while inpatient, given increased risk of VTE in setting of infection  Has declined outpatient postpartum Lovenox, but agreeable to administration while in-house  - SCDs while in bed

## 2022-03-26 NOTE — PLAN OF CARE
Problem: POSTPARTUM  Goal: Experiences normal postpartum course  Description: INTERVENTIONS:  - Monitor maternal vital signs  - Assess uterine involution and lochia  Outcome: Progressing  Goal: Appropriate maternal -  bonding  Description: INTERVENTIONS:  - Identify family support  - Assess for appropriate maternal/infant bonding   -Encourage maternal/infant bonding opportunities  - Referral to  or  as needed  Outcome: Progressing  Goal: Establishment of infant feeding pattern  Description: INTERVENTIONS:  - Assess breast/bottle feeding  - Refer to lactation as needed  Outcome: Progressing  Goal: Incision(s), wounds(s) or drain site(s) healing without S/S of infection  Description: INTERVENTIONS  - Assess and document dressing, incision, wound bed, drain sites and surrounding tissue  - Provide patient and family education  - Perform skin care/dressing changes rubina  Outcome: Progressing

## 2022-03-26 NOTE — PROGRESS NOTES
CT with 2 small nonobstructing stones on right (2 and 3 mm)  No appendicitis, appendix was seen  Ground glass lesion bases lungs bilateral      In general CT reassuring  Pt did have covid 12/21, CXR on admit negative for pneumonia, consideration to lung issue being ongoing inflammation lungs secondary to covid      Pt feeling much better now

## 2022-03-26 NOTE — ASSESSMENT & PLAN NOTE
- Diagnosed based on fever to 102 F, fundal tenderness, and leukocytosis  No evidence of respiratory infection, mastitis, VTE, or pyelonephritis  - Consideration has been given to possibility of retained products of conception, for which suspicion is currently low  Pelvic ultrasound images reviewed and overall unremarkable  Lochia is now normal    - Given persistent fever > 24 hours with temp of 103F overnight, Infectious Disease consult requested  Case discussed with ID (Dr April Abbott) by phone this morning  Formal consult pending   - Will continue Unasyn 3 g IV q6h until afebrile > 24 hours and with clinical improvement, per [facility] protocol    - TMax 103 1 F at 12:29 yesterday  Last fever 103F at 03:06 this morning  Continue routine vitals  - Leukocytosis down trending, with WBC 14 67 at time of admission -> 10 25 this morning  Repeat CBC ordered for tomorrow AM   - Blood cultures x 2 negative at 24 hours (preliminary)  Urine culture shows no growth (final)  - Continue to trend fever curve and leukocytosis  Leukocytosis has resolved and she is now 32 hours since her last fever  Pt feels well  Will give scheduled dose of Unasyn at 1300 and then will D/C to home

## 2022-03-27 PROBLEM — Z3A.36 36 WEEKS GESTATION OF PREGNANCY: Status: RESOLVED | Noted: 2021-12-27 | Resolved: 2022-03-27

## 2022-03-27 PROBLEM — Z3A.37 37 WEEKS GESTATION OF PREGNANCY: Status: RESOLVED | Noted: 2022-03-21 | Resolved: 2022-03-27

## 2022-03-27 PROBLEM — G43.909 MIGRAINE: Status: ACTIVE | Noted: 2022-03-27

## 2022-03-27 LAB
ALBUMIN SERPL BCP-MCNC: 2 G/DL (ref 3.5–5)
ALP SERPL-CCNC: 100 U/L (ref 46–116)
ALT SERPL W P-5'-P-CCNC: 28 U/L (ref 12–78)
ANION GAP SERPL CALCULATED.3IONS-SCNC: 8 MMOL/L (ref 4–13)
AST SERPL W P-5'-P-CCNC: 20 U/L (ref 5–45)
BACTERIA UR CULT: NORMAL
BASOPHILS # BLD AUTO: 0.03 THOUSANDS/ΜL (ref 0–0.1)
BASOPHILS NFR BLD AUTO: 0 % (ref 0–1)
BILIRUB SERPL-MCNC: 0.3 MG/DL (ref 0.2–1)
BUN SERPL-MCNC: 5 MG/DL (ref 5–25)
CALCIUM ALBUM COR SERPL-MCNC: 9.7 MG/DL (ref 8.3–10.1)
CALCIUM SERPL-MCNC: 8.1 MG/DL (ref 8.3–10.1)
CHLORIDE SERPL-SCNC: 106 MMOL/L (ref 100–108)
CO2 SERPL-SCNC: 26 MMOL/L (ref 21–32)
CREAT SERPL-MCNC: 0.64 MG/DL (ref 0.6–1.3)
EOSINOPHIL # BLD AUTO: 0.03 THOUSAND/ΜL (ref 0–0.61)
EOSINOPHIL NFR BLD AUTO: 0 % (ref 0–6)
ERYTHROCYTE [DISTWIDTH] IN BLOOD BY AUTOMATED COUNT: 13 % (ref 11.6–15.1)
GFR SERPL CREATININE-BSD FRML MDRD: 114 ML/MIN/1.73SQ M
GLUCOSE SERPL-MCNC: 83 MG/DL (ref 65–140)
HCT VFR BLD AUTO: 30.5 % (ref 34.8–46.1)
HGB BLD-MCNC: 10.4 G/DL (ref 11.5–15.4)
IMM GRANULOCYTES # BLD AUTO: 0.09 THOUSAND/UL (ref 0–0.2)
IMM GRANULOCYTES NFR BLD AUTO: 1 % (ref 0–2)
LYMPHOCYTES # BLD AUTO: 0.98 THOUSANDS/ΜL (ref 0.6–4.47)
LYMPHOCYTES NFR BLD AUTO: 10 % (ref 14–44)
MCH RBC QN AUTO: 30.1 PG (ref 26.8–34.3)
MCHC RBC AUTO-ENTMCNC: 34.1 G/DL (ref 31.4–37.4)
MCV RBC AUTO: 88 FL (ref 82–98)
MONOCYTES # BLD AUTO: 0.51 THOUSAND/ΜL (ref 0.17–1.22)
MONOCYTES NFR BLD AUTO: 5 % (ref 4–12)
NEUTROPHILS # BLD AUTO: 8.61 THOUSANDS/ΜL (ref 1.85–7.62)
NEUTS SEG NFR BLD AUTO: 84 % (ref 43–75)
NRBC BLD AUTO-RTO: 0 /100 WBCS
PLATELET # BLD AUTO: 229 THOUSANDS/UL (ref 149–390)
PMV BLD AUTO: 10.7 FL (ref 8.9–12.7)
POTASSIUM SERPL-SCNC: 3.6 MMOL/L (ref 3.5–5.3)
PROT SERPL-MCNC: 6.1 G/DL (ref 6.4–8.2)
RBC # BLD AUTO: 3.46 MILLION/UL (ref 3.81–5.12)
SODIUM SERPL-SCNC: 140 MMOL/L (ref 136–145)
WBC # BLD AUTO: 10.25 THOUSAND/UL (ref 4.31–10.16)

## 2022-03-27 PROCEDURE — 99233 SBSQ HOSP IP/OBS HIGH 50: CPT | Performed by: STUDENT IN AN ORGANIZED HEALTH CARE EDUCATION/TRAINING PROGRAM

## 2022-03-27 PROCEDURE — 85025 COMPLETE CBC W/AUTO DIFF WBC: CPT | Performed by: STUDENT IN AN ORGANIZED HEALTH CARE EDUCATION/TRAINING PROGRAM

## 2022-03-27 PROCEDURE — 80053 COMPREHEN METABOLIC PANEL: CPT | Performed by: STUDENT IN AN ORGANIZED HEALTH CARE EDUCATION/TRAINING PROGRAM

## 2022-03-27 PROCEDURE — NC001 PR NO CHARGE: Performed by: STUDENT IN AN ORGANIZED HEALTH CARE EDUCATION/TRAINING PROGRAM

## 2022-03-27 RX ORDER — BUTALBITAL, ACETAMINOPHEN AND CAFFEINE 50; 325; 40 MG/1; MG/1; MG/1
2 TABLET ORAL ONCE
Status: COMPLETED | OUTPATIENT
Start: 2022-03-27 | End: 2022-03-27

## 2022-03-27 RX ORDER — BUTALBITAL, ACETAMINOPHEN AND CAFFEINE 50; 325; 40 MG/1; MG/1; MG/1
2 TABLET ORAL EVERY 8 HOURS PRN
Status: DISCONTINUED | OUTPATIENT
Start: 2022-03-27 | End: 2022-03-28 | Stop reason: HOSPADM

## 2022-03-27 RX ORDER — PROCHLORPERAZINE MALEATE 5 MG/1
10 TABLET ORAL ONCE
Status: COMPLETED | OUTPATIENT
Start: 2022-03-27 | End: 2022-03-27

## 2022-03-27 RX ORDER — DIPHENHYDRAMINE HCL 25 MG
50 TABLET ORAL EVERY 8 HOURS PRN
Status: DISCONTINUED | OUTPATIENT
Start: 2022-03-27 | End: 2022-03-28 | Stop reason: HOSPADM

## 2022-03-27 RX ORDER — LEVOTHYROXINE SODIUM 0.03 MG/1
75 TABLET ORAL DAILY
Status: DISCONTINUED | OUTPATIENT
Start: 2022-03-28 | End: 2022-03-28 | Stop reason: HOSPADM

## 2022-03-27 RX ORDER — PANTOPRAZOLE SODIUM 40 MG/1
40 TABLET, DELAYED RELEASE ORAL
Status: DISCONTINUED | OUTPATIENT
Start: 2022-03-27 | End: 2022-03-28 | Stop reason: HOSPADM

## 2022-03-27 RX ORDER — DIPHENHYDRAMINE HCL 25 MG
50 TABLET ORAL ONCE
Status: COMPLETED | OUTPATIENT
Start: 2022-03-27 | End: 2022-03-27

## 2022-03-27 RX ADMIN — BUTALBITAL, ACETAMINOPHEN AND CAFFEINE 2 TABLET: 50; 325; 40 TABLET ORAL at 18:04

## 2022-03-27 RX ADMIN — AMPICILLIN SODIUM AND SULBACTAM SODIUM 3 G: 2; 1 INJECTION, POWDER, FOR SOLUTION INTRAMUSCULAR; INTRAVENOUS at 01:21

## 2022-03-27 RX ADMIN — ENOXAPARIN SODIUM 40 MG: 100 INJECTION SUBCUTANEOUS at 21:03

## 2022-03-27 RX ADMIN — ENOXAPARIN SODIUM 40 MG: 100 INJECTION SUBCUTANEOUS at 09:21

## 2022-03-27 RX ADMIN — IBUPROFEN 600 MG: 600 TABLET ORAL at 23:26

## 2022-03-27 RX ADMIN — DIPHENHYDRAMINE HYDROCHLORIDE 50 MG: 25 TABLET ORAL at 10:13

## 2022-03-27 RX ADMIN — AMPICILLIN SODIUM AND SULBACTAM SODIUM 3 G: 2; 1 INJECTION, POWDER, FOR SOLUTION INTRAMUSCULAR; INTRAVENOUS at 07:12

## 2022-03-27 RX ADMIN — BUTALBITAL, ACETAMINOPHEN, AND CAFFEINE 2 TABLET: 50; 325; 40 TABLET ORAL at 09:40

## 2022-03-27 RX ADMIN — DIPHENHYDRAMINE HYDROCHLORIDE 50 MG: 25 TABLET ORAL at 19:08

## 2022-03-27 RX ADMIN — PROCHLORPERAZINE MALEATE 10 MG: 5 TABLET ORAL at 03:32

## 2022-03-27 RX ADMIN — PANTOPRAZOLE SODIUM 40 MG: 40 TABLET, DELAYED RELEASE ORAL at 14:17

## 2022-03-27 RX ADMIN — IBUPROFEN 600 MG: 600 TABLET ORAL at 11:48

## 2022-03-27 RX ADMIN — AMPICILLIN SODIUM AND SULBACTAM SODIUM 3 G: 2; 1 INJECTION, POWDER, FOR SOLUTION INTRAMUSCULAR; INTRAVENOUS at 18:05

## 2022-03-27 RX ADMIN — AMPICILLIN SODIUM AND SULBACTAM SODIUM 3 G: 2; 1 INJECTION, POWDER, FOR SOLUTION INTRAMUSCULAR; INTRAVENOUS at 12:52

## 2022-03-27 RX ADMIN — IBUPROFEN 600 MG: 600 TABLET ORAL at 01:51

## 2022-03-27 NOTE — PROGRESS NOTES
Progress Note - OB/GYN  Post-Partum Physician Note   Sergei Thomson 39 y o  female MRN: [id number]  Unit/Bed#: -01 Encounter: [id number]    Assessment:  39y o  year-old , postpartum day #6 s/p , now HD#3 following readmission for postpartum endometritis    Plan:     Postpartum endometritis  - Diagnosed based on fever to 102 F, fundal tenderness, and leukocytosis  No evidence of respiratory infection, mastitis, VTE, or pyelonephritis  - Consideration has been given to possibility of retained products of conception, for which suspicion is currently low  Pelvic ultrasound images reviewed and overall unremarkable  Lochia is now normal    - Given persistent fever > 24 hours with temp of 103F overnight, Infectious Disease consult requested  Case discussed with ID (Dr Luis Mcdonald) by phone this morning  Formal consult pending   - Will continue Unasyn 3 g IV q6h until afebrile > 24 hours and with clinical improvement, per [facility] protocol    - TMax 103 1 F at 12:29 yesterday  Last fever 103F at 03:06 this morning  Continue routine vitals  - Leukocytosis down trending, with WBC 14 67 at time of admission -> 10 25 this morning  Repeat CBC ordered for tomorrow AM   - Blood cultures x 2 negative at 24 hours (preliminary)  Urine culture shows no growth (final)  - Continue to trend fever curve and leukocytosis  History of DVT (deep vein thrombosis)  - Will continue Lovenox ppx while inpatient, given increased risk of VTE in setting of infection  Has declined outpatient postpartum Lovenox, but agreeable to administration while in-house  - SCDs while in bed  Gestational hypertension, third trimester  - Blood pressures normal to mild-range since time of readmission, with elevations noted during episodes of fever   - CBC sent this morning shows stable hemoglobin and platelets  Will add CMP to assess for derangements suggestive of pre-eclampsia with severe features        Migraine  - Patient with persistent headache since yesterday, which improved slightly with oral compazine last night  Patient reports that headache is consistent with her prior migraine headaches  Denies visual changes, CP, SOB, RUQ pain, or peripheral swelling  Headache felt to be most consistent with migraine  Pre-eclampsia with severe features felt to be less likely  - Will administer Fioricet and Benadryl now  Patient states that she was previously on Nurtec for migraine prior to pregnancy, which is breastfeeding category L3  Nurtec unfortunately not on formulary   - Patient seen and evaluated by Anesthesia (Dr Dilshad Thao) this morning  Headache not felt to be due to complication of neuraxial anesthesia  Case reviewed with Maternal Fetal Medicine (Dr Suyapa Bobo) by phone  Pelvic US imaging reviewed by Dr Francine Chaudhary, who agreed that retained POC unlikely and would not recommend D&E at this time  _________________________________    Subjective: This morning, patient reports persistent 8/10 left-sided headache, which extends down her neck  Reports this is classic for her typical migraine headaches       Pain: Adequately controlled  Tolerating Oral Intake: Yes  Voiding: Yes  Ambulating: Yes  Breastfeeding: Yes - pumping  Chest Pain: No  Shortness of Breath: No  Leg Pain/Discomfort: No  Lochia: Scant    Objective:   Vitals:    03/27/22 0421 03/27/22 0700 03/27/22 0924 03/27/22 1156   BP: 136/84 120/78  122/86   BP Location: Left arm Left arm  Left arm   Pulse: 104 88  94   Resp: 20 16  18   Temp: 99 7 °F (37 6 °C) 98 4 °F (36 9 °C) 99 5 °F (37 5 °C) 98 7 °F (37 1 °C)   TempSrc: Oral Oral Oral Oral   SpO2: 96%   97%   Weight:       Height:           Intake/Output Summary (Last 24 hours) at 3/27/2022 1324  Last data filed at 3/26/2022 2226  Gross per 24 hour   Intake 1145 83 ml   Output --   Net 1145 83 ml       Physical Exam:  General: in no apparent distress, alert, oriented times 3 and afebrile  Neck: No nuchal rigidity  Lungs: CTA throughout  CVS: RRR, no crackles, wheezes, rhonchi, or rales  Back: No spinal tenderness, no CVA tenderness  Abdomen: Soft, mild diffuse lower abdominal tenderness without rebound or guarding  Fundus: Firm, 1 below the umbilicus, mildly tender to deep palpation   Lower extremeties: non-tender, no edema, no calf tenderness       Labs/Tests:   Lab Results   Component Value Date/Time    HGB 10 4 (L) 03/27/2022 03:31 AM    HGB 10 2 (L) 03/26/2022 06:13 AM    HGB 10 6 (L) 03/25/2022 11:24 PM     03/27/2022 03:31 AM     03/26/2022 06:13 AM     03/25/2022 11:24 PM    WBC 10 25 (H) 03/27/2022 03:31 AM    WBC 11 76 (H) 03/26/2022 06:13 AM    WBC 14 67 (H) 03/25/2022 11:24 PM    CREATININE 0 64 03/27/2022 09:24 AM    CREATININE 0 70 03/25/2022 11:24 PM    ALT 28 03/27/2022 09:24 AM    ALT 27 03/25/2022 11:24 PM    AST 20 03/27/2022 09:24 AM    AST 21 03/25/2022 11:24 PM    PTT 30 03/25/2022 11:24 PM    INR 1 04 03/25/2022 11:24 PM        Brief OB Lab review:  ABO Grouping   Date Value Ref Range Status   03/21/2022 A  Final      Rh Factor   Date Value Ref Range Status   03/21/2022 Positive  Final    No results found for: ANTIBODYSCR  No results found for: RUBM    MEDS:   Current Facility-Administered Medications   Medication Dose Route Frequency    acetaminophen (TYLENOL) tablet 975 mg  975 mg Oral Q8H PRN    ampicillin-sulbactam (UNASYN) in sodium chloride 0 9% 3 g  3 g Intravenous Q6H    enoxaparin (LOVENOX) subcutaneous injection 40 mg  40 mg Subcutaneous Q12H CHARO    ibuprofen (MOTRIN) tablet 600 mg  600 mg Oral Q6H PRN    lactated ringers infusion  125 mL/hr Intravenous Continuous    [START ON 3/28/2022] levothyroxine tablet 75 mcg  75 mcg Oral Daily    pantoprazole (PROTONIX) EC tablet 40 mg  40 mg Oral Early Morning     Invasive Devices  Report    Peripheral Intravenous Line            Peripheral IV 03/26/22 Right;Ventral (anterior) Forearm 1 day                  Mimi Chua, MD  3/27/2022 1:24 PM

## 2022-03-27 NOTE — PROGRESS NOTES
Asked by Dr Irena Bruce to assess Pt's headache  Personally did Epidural on 3/21/22 with one attempt needed  No headache post op  Readmitted with Endometritis  Persistent R Frontal Headache radiating into R neck and Shoulder  No change if lies flat  Has a hx  Of Migraines  Pt  Reports that this headache is similar to past Migraines  No Blurred Vision  Ambulatory w/o change in Headache  Assessment  Not a post dural puncture H/A     Mal Reid MD

## 2022-03-27 NOTE — ASSESSMENT & PLAN NOTE
- Blood pressures normal to mild-range since time of readmission, with elevations noted during episodes of fever   - CBC sent this morning shows stable hemoglobin and platelets  Will add CMP to assess for derangements suggestive of pre-eclampsia with severe features  Labs have not shown evidence of severe features  Bps remain in the mildly elevated range   No role at this time for antihypertensives

## 2022-03-27 NOTE — PROGRESS NOTES
In to assess patient due to recurrent fever to 103F  Patient received tylenol last night at 21:08 and ibuprofen at 01:51  Patient reports associated body aches and headache, which she rates at 8/10 in intensity  Headache is right-sided and extends down her neck  Regarding her abdominal pain, she reports that this has improved somewhat since time of admission  She denies cough, CP, SOB, N/V, diarrhea, dysuria, urinary urgency/frequency  She denies heavy bleeding or purulent vaginal discharge  /97 (BP Location: Left arm)   Pulse (!) 107   Temp (!) 103 °F (39 4 °C) (Oral)   Resp 22   Ht 5' 5" (1 651 m)   Wt 91 2 kg (201 lb)   LMP 06/25/2021 (Exact Date)   SpO2 98%   Breastfeeding Yes   BMI 33 45 kg/m²     Gen: Pale, ill-appearing, but in no acute distress  Resp: Normal work of breathing  Abd: Diffuse lower abdominal tenderness, now L>R  Back: No CVA tenderness  Ext: Non-tender, no edema  No calf tenderness  A/P: Allan Amy Thomson is a [id number] now PPD#6/HD#3 readmitted with postpartum endometritis    Mildly tachycardic, but maintaining adequate BP  Low suspicion for sepsis at this time       - Continue Unasyn 3 g IV q6h  - Will administer compazine 10mg PO now for headache  - Draw AM CBC now to trend leukocytosis  - Blankets removed, will give ice water to assist in cooling  - Given > 24 hours with continued high fever, will consult Infectious Disease    Carolyn Candelario MD  3/27/2022 3:25 AM

## 2022-03-27 NOTE — QUICK NOTE
Patient reports substantial improvement in headache, now 2/10 in intensity  No other signs/symptoms of pre-eclampsia  Blood pressure currently normal-range  CMP results reviewed and unremarkable  Will continue to monitor       Jennifer Huang MD  3/27/2022 1:23 PM

## 2022-03-27 NOTE — ASSESSMENT & PLAN NOTE
- Patient with persistent headache since yesterday, which improved slightly with oral compazine last night  Patient reports that headache is consistent with her prior migraine headaches  Denies visual changes, CP, SOB, RUQ pain, or peripheral swelling  Headache felt to be most consistent with migraine  Pre-eclampsia with severe features felt to be less likely  - Will administer Fioricet and Benadryl now  Patient states that she was previously on Nurtec for migraine prior to pregnancy, which is breastfeeding category L3  Nurtec unfortunately not on formulary   - Patient seen and evaluated by Anesthesia (Dr Edita Snell) this morning  Headache not felt to be due to complication of neuraxial anesthesia       HA resolved

## 2022-03-28 VITALS
HEART RATE: 82 BPM | BODY MASS INDEX: 33.49 KG/M2 | OXYGEN SATURATION: 98 % | SYSTOLIC BLOOD PRESSURE: 149 MMHG | WEIGHT: 201 LBS | TEMPERATURE: 97.9 F | DIASTOLIC BLOOD PRESSURE: 85 MMHG | RESPIRATION RATE: 18 BRPM | HEIGHT: 65 IN

## 2022-03-28 PROBLEM — G43.909 MIGRAINE: Status: RESOLVED | Noted: 2022-03-27 | Resolved: 2022-03-28

## 2022-03-28 LAB
ALBUMIN SERPL BCP-MCNC: 1.8 G/DL (ref 3.5–5)
ALP SERPL-CCNC: 91 U/L (ref 46–116)
ALT SERPL W P-5'-P-CCNC: 24 U/L (ref 12–78)
ANION GAP SERPL CALCULATED.3IONS-SCNC: 10 MMOL/L (ref 4–13)
AST SERPL W P-5'-P-CCNC: 14 U/L (ref 5–45)
BASOPHILS # BLD AUTO: 0.03 THOUSANDS/ΜL (ref 0–0.1)
BASOPHILS NFR BLD AUTO: 0 % (ref 0–1)
BILIRUB SERPL-MCNC: 0.2 MG/DL (ref 0.2–1)
BUN SERPL-MCNC: 9 MG/DL (ref 5–25)
CALCIUM ALBUM COR SERPL-MCNC: 9.9 MG/DL (ref 8.3–10.1)
CALCIUM SERPL-MCNC: 8.1 MG/DL (ref 8.3–10.1)
CHLORIDE SERPL-SCNC: 108 MMOL/L (ref 100–108)
CO2 SERPL-SCNC: 24 MMOL/L (ref 21–32)
CREAT SERPL-MCNC: 0.64 MG/DL (ref 0.6–1.3)
EOSINOPHIL # BLD AUTO: 0.24 THOUSAND/ΜL (ref 0–0.61)
EOSINOPHIL NFR BLD AUTO: 3 % (ref 0–6)
ERYTHROCYTE [DISTWIDTH] IN BLOOD BY AUTOMATED COUNT: 12.7 % (ref 11.6–15.1)
GFR SERPL CREATININE-BSD FRML MDRD: 114 ML/MIN/1.73SQ M
GLUCOSE SERPL-MCNC: 84 MG/DL (ref 65–140)
HCT VFR BLD AUTO: 28.8 % (ref 34.8–46.1)
HGB BLD-MCNC: 9.6 G/DL (ref 11.5–15.4)
IMM GRANULOCYTES # BLD AUTO: 0.04 THOUSAND/UL (ref 0–0.2)
IMM GRANULOCYTES NFR BLD AUTO: 1 % (ref 0–2)
LYMPHOCYTES # BLD AUTO: 1.89 THOUSANDS/ΜL (ref 0.6–4.47)
LYMPHOCYTES NFR BLD AUTO: 22 % (ref 14–44)
MCH RBC QN AUTO: 29.5 PG (ref 26.8–34.3)
MCHC RBC AUTO-ENTMCNC: 33.3 G/DL (ref 31.4–37.4)
MCV RBC AUTO: 89 FL (ref 82–98)
MONOCYTES # BLD AUTO: 0.98 THOUSAND/ΜL (ref 0.17–1.22)
MONOCYTES NFR BLD AUTO: 11 % (ref 4–12)
NEUTROPHILS # BLD AUTO: 5.53 THOUSANDS/ΜL (ref 1.85–7.62)
NEUTS SEG NFR BLD AUTO: 63 % (ref 43–75)
NRBC BLD AUTO-RTO: 0 /100 WBCS
PLATELET # BLD AUTO: 236 THOUSANDS/UL (ref 149–390)
PMV BLD AUTO: 10.2 FL (ref 8.9–12.7)
POTASSIUM SERPL-SCNC: 3.7 MMOL/L (ref 3.5–5.3)
PROT SERPL-MCNC: 5.6 G/DL (ref 6.4–8.2)
RBC # BLD AUTO: 3.25 MILLION/UL (ref 3.81–5.12)
SODIUM SERPL-SCNC: 142 MMOL/L (ref 136–145)
WBC # BLD AUTO: 8.71 THOUSAND/UL (ref 4.31–10.16)

## 2022-03-28 PROCEDURE — 99238 HOSP IP/OBS DSCHRG MGMT 30/<: CPT | Performed by: OBSTETRICS & GYNECOLOGY

## 2022-03-28 PROCEDURE — 85025 COMPLETE CBC W/AUTO DIFF WBC: CPT | Performed by: STUDENT IN AN ORGANIZED HEALTH CARE EDUCATION/TRAINING PROGRAM

## 2022-03-28 PROCEDURE — 80053 COMPREHEN METABOLIC PANEL: CPT | Performed by: STUDENT IN AN ORGANIZED HEALTH CARE EDUCATION/TRAINING PROGRAM

## 2022-03-28 PROCEDURE — NC001 PR NO CHARGE: Performed by: OBSTETRICS & GYNECOLOGY

## 2022-03-28 RX ORDER — POLYETHYLENE GLYCOL 3350 17 G/17G
17 POWDER, FOR SOLUTION ORAL DAILY PRN
Status: DISCONTINUED | OUTPATIENT
Start: 2022-03-28 | End: 2022-03-28 | Stop reason: HOSPADM

## 2022-03-28 RX ORDER — DOCUSATE SODIUM 100 MG/1
100 CAPSULE, LIQUID FILLED ORAL 2 TIMES DAILY PRN
Status: DISCONTINUED | OUTPATIENT
Start: 2022-03-28 | End: 2022-03-28 | Stop reason: HOSPADM

## 2022-03-28 RX ADMIN — AMPICILLIN SODIUM AND SULBACTAM SODIUM 3 G: 2; 1 INJECTION, POWDER, FOR SOLUTION INTRAMUSCULAR; INTRAVENOUS at 06:03

## 2022-03-28 RX ADMIN — AMPICILLIN SODIUM AND SULBACTAM SODIUM 3 G: 2; 1 INJECTION, POWDER, FOR SOLUTION INTRAMUSCULAR; INTRAVENOUS at 13:02

## 2022-03-28 RX ADMIN — AMPICILLIN SODIUM AND SULBACTAM SODIUM 3 G: 2; 1 INJECTION, POWDER, FOR SOLUTION INTRAMUSCULAR; INTRAVENOUS at 00:24

## 2022-03-28 RX ADMIN — IBUPROFEN 600 MG: 600 TABLET ORAL at 06:03

## 2022-03-28 RX ADMIN — ENOXAPARIN SODIUM 40 MG: 100 INJECTION SUBCUTANEOUS at 09:39

## 2022-03-28 RX ADMIN — DOCUSATE SODIUM 100 MG: 100 CAPSULE, LIQUID FILLED ORAL at 07:43

## 2022-03-28 RX ADMIN — BUTALBITAL, ACETAMINOPHEN AND CAFFEINE 2 TABLET: 50; 325; 40 TABLET ORAL at 07:43

## 2022-03-28 RX ADMIN — DIPHENHYDRAMINE HYDROCHLORIDE 50 MG: 25 TABLET ORAL at 07:43

## 2022-03-28 RX ADMIN — PANTOPRAZOLE SODIUM 40 MG: 40 TABLET, DELAYED RELEASE ORAL at 06:03

## 2022-03-28 RX ADMIN — POLYETHYLENE GLYCOL 3350 17 G: 17 POWDER, FOR SOLUTION ORAL at 00:37

## 2022-03-28 RX ADMIN — LEVOTHYROXINE SODIUM 75 MCG: 25 TABLET ORAL at 06:42

## 2022-03-28 NOTE — DISCHARGE INSTRUCTIONS
Endometritis   WHAT YOU NEED TO KNOW:   Endometritis is inflammation of the lining of your uterus  DISCHARGE INSTRUCTIONS:   Call your doctor if:   · You feel lightheaded or you have fainted  · You have vaginal bleeding that is not your monthly period  · Your symptoms become worse, even after you start treatment with medicine  · You have a fever  · Your symptoms come back after treatment  · You have questions or concerns about your condition or care  Medicines:   · Antibiotics  fight or prevent an infection caused by bacteria  · Take your medicine as directed  Contact your healthcare provider if you think your medicine is not helping or if you have side effects  Tell him or her if you are allergic to any medicine  Keep a list of the medicines, vitamins, and herbs you take  Include the amounts, and when and why you take them  Bring the list or the pill bottles to follow-up visits  Carry your medicine list with you in case of an emergency  Follow up with your doctor or gynecologist as directed: You may need to return for tests to make sure that your infection is gone  Write down your questions so you remember to ask them during your visits  © Copyright [employer] 2022 Information is for End User's use only and may not be sold, redistributed or otherwise used for commercial purposes  All illustrations and images included in CareNotes® are the copyrighted property of A D A M , Inc  or [facility] Karen Spencer   The above information is an  only  It is not intended as medical advice for individual conditions or treatments  Talk to your doctor, nurse or pharmacist before following any medical regimen to see if it is safe and effective for you

## 2022-03-28 NOTE — PLAN OF CARE
Problem: POSTPARTUM  Goal: Experiences normal postpartum course  Description: INTERVENTIONS:  - Monitor maternal vital signs  - Assess uterine involution and lochia  Outcome: Progressing  Goal: Incision(s), wounds(s) or drain site(s) healing without S/S of infection  Description: INTERVENTIONS  - Assess and document dressing, incision, wound bed, drain sites and surrounding tissue  - Provide patient and family education  Outcome: Progressing

## 2022-03-28 NOTE — PLAN OF CARE
Problem: POSTPARTUM  Goal: Experiences normal postpartum course  Description: INTERVENTIONS:  - Monitor maternal vital signs  - Assess uterine involution and lochia  Outcome: Progressing  Goal: Incision(s), wounds(s) or drain site(s) healing without S/S of infection  Description: INTERVENTIONS  - Assess and document dressing, incision, wound bed, drain sites and surrounding tissue  - Provide patient and family education  - Perform skin care/dressing changes every   Outcome: Progressing

## 2022-03-28 NOTE — DISCHARGE SUMMARY
Discharge Summary - Gynecology  [facility] Berto 39 y o  female MRN: [id number]  Unit/Bed#: -01 Encounter: [id number]    Admission Date: 3/25/2022   Discharge Date: 03/28/22    Attending Physician:   Brookie Libman, MD  Consulting Physician(s):   Tomeka Jimenez MD - Infectious disease    Admitting Diagnosis:   Post partum endometritis  Mild gestational hypertension  Discharge Diagnosis:   Same    Procedures Performed:   None    HPI: Pt presented with fever on the evening of PPD #4  She was diagnosed with PP endometritis and treated with Unasyn  Her symptoms improved and she is now over 32 hours afebrile with a normal WBC  She will get one further dose of Unasyn and will then D/C to home  Blood pressures have been stable since initial labor and all lab work has been normal  She has no evidence of severe disease during this admission      Lab Results:   Lab Results   Component Value Date    WBC 8 71 03/28/2022    HGB 9 6 (L) 03/28/2022    HCT 28 8 (L) 03/28/2022    MCV 89 03/28/2022     03/28/2022     Lab Results   Component Value Date    CALCIUM 8 1 (L) 03/28/2022    K 3 7 03/28/2022    CO2 24 03/28/2022     03/28/2022    BUN 9 03/28/2022    CREATININE 0 64 03/28/2022     Lab Results   Component Value Date/Time    POCGLU 87 03/21/2022 10:56 AM    POCGLU 116 12/19/2021 10:25 AM    POCGLU 78 12/19/2021 07:48 AM    POCGLU 123 12/18/2021 09:21 PM    POCGLU 186 (H) 12/18/2021 03:36 PM     Lab Results   Component Value Date    PTT 30 03/25/2022     Lab Results   Component Value Date    INR 1 04 03/25/2022    INR 1 04 12/12/2021    PROTIME 13 5 03/25/2022    PROTIME 13 5 88/21/1691       Complications:   None    Condition at Discharge:   good     Discharge Medications:   See after visit summary for reconciled discharge medications provided to patient and family  Discharge instructions: See after visit summary for complete information    Do not place anything (no partner, tampons or douche) in your vagina for 6 weeks  You may walk for exercise for the first 6 weeks then gradually return to your usual activities  You may take stairs one at a time, touching each step with both feet for the first few days, then as tolerated  Please call us for temperature > 100 4*F or 38* C, worsening pain or a foul discharge  No heavy lifting more than one full gallon of milk (about 8 lbs)  No tub baths or swimming  Provisions for Follow-Up Care:   See after visit summary for information related to follow-up care and any pertinent home health orders        Disposition: Home  Planned Readmission: No        Timur Couch MD  3/28/2022  11:12 AM

## 2022-03-28 NOTE — UTILIZATION REVIEW
Initial Clinical Review    Admission: Date/Time/Statement:   Admission Orders (From admission, onward)     Ordered        22 0155  Inpatient Admission  Once                      Orders Placed This Encounter   Procedures    Inpatient Admission     Standing Status:   Standing     Number of Occurrences:   1     Order Specific Question:   Level of Care     Answer:   Med Surg [16]     Order Specific Question:   Estimated length of stay     Answer:   More than 2 Midnights     Order Specific Question:   Certification     Answer:   I certify that inpatient services are medically necessary for this patient for a duration of greater than two midnights  See H&P and MD Progress Notes for additional information about the patient's course of treatment  ED Arrival Information     Expected Arrival Acuity    - 3/25/2022 22:35 Emergent         Means of arrival Escorted by Service Admission type    Wheelchair Spouse OB/GYN Emergency         Arrival complaint    birth 3/21, bleeding and pain, fever        Chief Complaint   Patient presents with    Fever - 9 weeks to 74 years     Pt  had vaginal delivery 3/21/22, developed fever today, was 103 and had taken 600mg po motrin @ 2200  Pt denies n/v/d  Pt reports +clots, light bleeding  Initial Presentation: 39 y o  female PMH DVT, [id number]  PPD #5 s/p   Presents with 1 day history of fever to 102 F, increased pelvic pain, myalgia, fatigue, increased in lochia  US concerning for endometritis, labs w leukocytosis, fever  Admit Inpatient due to postpartum endometritis  PLAN IV antibx until afebrile w clinical improvement, monitor blood cultures, UA, vitals  Lovenox for PPX  Cont post partum care     Date: 3/27/2022   Day 2:   OB Attending early AM note: febrile; mildly tachy  Last night received Tylenol at 2108 & Ibuprofen at 0151 reports body aches & HA mod severe; Lower abd pain improving;  PPD #5 s/p   Continue Unasyn 3 g IV q 6h until afebrile   Leukocytosis trending down repeat CBC in AM  Blood cultures neg 24 HR, Urine culture shows no growth in final report  administer compazine 10mg PO now for headache, ice water & remove blankets to assist in cooling; given > 24 HR w cont high fever consult ID  ANESTHESIA:   Exam:  not a post dural HA; patient reports symptoms similar to Migraine HA ; no blurred vision  OB MD: Reporting improvement of HA, no signs pre eclampsia  INFECTIOUS DISEASE   Consult for fever, abd pain, myalgias & fatigue  Starting to feel improved  Exam ABDOMEN:  +BS, soft, +mild discomfort with palpation of lower abd  MUSCULOSKELETAL: No calf tenderness  EXTREMITIES:  No LE edema  SKIN: No rash  NEURO: Grossly non focal  UA w pyuria; urine cx negative but specimen sent after received first dose of Unasyn which could affect results, no urinary symptoms  BLD CX negative  Post partum endometritis w fever leukocytosis ; she has only received 36 HR of antibx; cont treat pending final Blood cultures & monitor for development of toxicity to current antibx treatment     3/28/2022 DAY#3  OB MD  [id number], postpartum day #7 s/p , presented evening PPD#5 now  HD#3 following readmission for postpartum endometritis  symptoms improved and she is now over 32 hours afebrile with a normal WBC  She will get one further dose of Unasyn and will then D/C to home     Blood pressures have been stable since initial labor and all lab work has been normal     Triage Vitals [22 2307]   Temperature Pulse Respirations Blood Pressure SpO2   99 9 °F (37 7 °C) (!) 110 22 147/74 96 %      Temp Source Heart Rate Source Patient Position - Orthostatic VS BP Location FiO2 (%)   Temporal Monitor Sitting Left arm --      Pain Score       8          Wt Readings from Last 1 Encounters:   22 91 2 kg (201 lb)     Additional Vital Signs:    Date/Time Temp Pulse Resp BP MAP (mmHg) SpO2 O2 Device Cardiac (WDL) Patient Position - Orthostatic VS   22 0700 97 9 °F (36 6 °C) 82 18 149/85 -- 98 % None (Room air) -- Sitting   03/28/22 0313 98 7 °F (37 1 °C) 66 16 121/84 97 98 % None (Room air) -- Lying   03/27/22 2326 -- -- -- -- -- -- None (Room air) WDL --   03/27/22 2317 98 9 °F (37 2 °C) 87 18 144/97 -- 99 % -- -- Lying   03/27/22 1915 97 7 °F (36 5 °C) 90 16 133/91 -- 97 % None (Room air) -- Sitting   03/27/22 1611 98 9 °F (37 2 °C) 97 18 133/88 -- 97 % None (Room air) -- Sitting   03/27/22 1156 98 7 °F (37 1 °C) 94 18 122/86 -- 97 % -- -- --   03/27/22 0940 -- -- -- -- -- -- None (Room air) WDL --   03/27/22 0924 99 5 °F (37 5 °C) -- -- -- -- -- -- -- --   03/27/22 0700 98 4 °F (36 9 °C) 88 16 120/78 88 -- -- -- Lying   03/27/22 0421 99 7 °F (37 6 °C) 104 20 136/84 93 96 % None (Room air) -- Lying   03/27/22 0306 103 °F (39 4 °C) Abnormal  107 Abnormal  22 150/97 109 -- -- -- Lying   03/27/22 0146 101 3 °F (38 5 °C) Abnormal   -- -- -- -- -- -- -- --   03/26/22 2305 98 6 °F (37 °C) 98 18 127/85 94 -- -- WDL Sitting   03/26/22 1921 98 7 °F (37 1 °C) 94 -- 130/76 88 -- -- -- Lying   03/26/22 1318 101 3 °F (38 5 °C) Abnormal  107 Abnormal  24 Abnormal  138/88 -- -- -- -- --   03/26/22 1229 103 1 °F (39 5 °C) Abnormal  105 24 Abnormal  152/101 Abnormal  -- -- -- -- --   03/26/22 0800 -- -- -- -- -- -- -- WDL --   03/26/22 0700 98 5 °F (36 9 °C) 100 20 129/76 -- 98 % -- -- Lying   03/26/22 0639 100 7 °F (38 2 °C) Abnormal   -- -- -- -- -- -- -- --   03/26/22 0616 102 9 °F (39 4 °C) Abnormal   -- -- -- -- -- -- -- --   03/26/22 0421 98 °F (36 7 °C) 97 22 130/94 -- 98 % None (Room air) WDL Lying   03/26/22 0145 97 9 °F (36 6 °C) 97 20 107/66 78 97 % -- -- --   03/26/22 0115 -- 97 19 -- -- 95 % -- -- --   03/26/22 0015 -- 93 19 -- -- 95 % -- -- --    Comments:   Temp: Nurse Notified at 03/27/22 0146   Temp: nurse notified at 03/26/22 0639   Temp: nurse notified at 03/26/22 0616  Weights (last 14 days)    Date/Time Weight Weight Method Height   03/25/22 2307 91 2 kg (201 lb) Standing scale 5' 5" (1 651 m)     Pertinent Labs/Diagnostic Test Results:   CT abdomen pelvis wo contrast   Final Result by Mallory Berman MD ( 2439)      No acute intra-abdominal abnormality  Scattered groundglass opacities at the lung bases likely infectious or inflammatory in etiology  Nonobstructing renal calculi  US pelvis complete w transvaginal   Final Result by Alli Vaca DO ( [id number])       Enlarged uterus which correlates with recent   Normal caliber of the endometrium with a heterogeneous appearance; consider endometrial blood products versus retained products of conception; endometritis also considered in the appropriate clinical setting  Correlation with the patient's symptoms,    physical exam and laboratory values recommended       Normal appearance of the bilateral ovaries  XR chest portable      Final Result by Jagdish Jean MD ( 0030)      No acute cardiopulmonary disease  Results from last 7 days   Lab Units 22  0422   SARS-COV-2  Negative     Results from last 7 days   Lab Units 22  0608 22  0331 22  0613 22  2324 22  2324 22  0544 22  0544   WBC Thousand/uL 8 71 10 25* 11 76*   < > 14 67*   < > 13 87*   HEMOGLOBIN g/dL 9 6* 10 4* 10 2*  --  10 6*  --  10 0*   HEMATOCRIT % 28 8* 30 5* 29 9*  --  31 8*  --  31 1*   PLATELETS Thousands/uL 236 229 209   < > 227   < > 206   NEUTROS ABS Thousands/µL 5 53 8 61* 10 56*   < > 13 34*  --   --     < > = values in this interval not displayed           Results from last 7 days   Lab Units 22  0608 22  0924 22  2324   SODIUM mmol/L 142 140 138   POTASSIUM mmol/L 3 7 3 6 3 5   CHLORIDE mmol/L 108 106 103   CO2 mmol/L 24 26 22   ANION GAP mmol/L 10 8 13   BUN mg/dL 9 5 9   CREATININE mg/dL 0 64 0 64 0 70   EGFR ml/min/1 73sq m 114 114 111   CALCIUM mg/dL 8 1* 8 1* 8 2*     Results from last 7 days   Lab Units 22  0608 22  7851 03/25/22  2324   AST U/L 14 20 21   ALT U/L 24 28 27   ALK PHOS U/L 91 100 106   TOTAL PROTEIN g/dL 5 6* 6 1* 6 3*   ALBUMIN g/dL 1 8* 2 0* 2 4*   TOTAL BILIRUBIN mg/dL 0 20 0 30 0 40         Results from last 7 days   Lab Units 03/28/22  0608 03/27/22  0924 03/25/22  2324   GLUCOSE RANDOM mg/dL 84 83 112             No results found for: BETA-HYDROXYBUTYRATE                           Results from last 7 days   Lab Units 03/25/22  2324   PROTIME seconds 13 5   INR  1 04   PTT seconds 30         Results from last 7 days   Lab Units 03/25/22  2324   PROCALCITONIN ng/ml 0 22     Results from last 7 days   Lab Units 03/25/22  2324   LACTIC ACID mmol/L 1 1                                         Results from last 7 days   Lab Units 03/26/22  0405   CLARITY UA  Cloudy   COLOR UA  Red   SPEC GRAV UA  <=1 005   PH UA  6 5   GLUCOSE UA mg/dl Negative   KETONES UA mg/dl Negative   BLOOD UA  Large*   PROTEIN UA mg/dl 30 (1+)*   NITRITE UA  Negative   BILIRUBIN UA  Negative   UROBILINOGEN UA E U /dl 0 2   LEUKOCYTES UA  Moderate*   WBC UA /hpf 30-50*   RBC UA /hpf Innumerable*   BACTERIA UA /hpf Occasional   EPITHELIAL CELLS WET PREP /hpf Occasional     Results from last 7 days   Lab Units 03/26/22  0422   INFLUENZA A PCR  Negative   INFLUENZA B PCR  Negative   RSV PCR  Negative                             Results from last 7 days   Lab Units 03/26/22  0405 03/25/22  2324   BLOOD CULTURE   --  No Growth at 48 hrs  No Growth at 48 hrs     URINE CULTURE  No Growth <1000 cfu/mL  --                ED Treatment:   Medication Administration from 03/25/2022 2234 to 03/26/2022 4123       Date/Time Order Dose Route Action     03/26/2022 0128 ampicillin-sulbactam (UNASYN) in sodium chloride 0 9% 3 g 3 g Intravenous Given     03/26/2022 0143 acetaminophen (TYLENOL) tablet 650 mg 650 mg Oral Given        Past Medical History:   Diagnosis Date    Acid reflux     Bicuspid aortic valve     COVID-19 affecting pregnancy in second trimester 12/12/2021    Depression 2004    in 2004 after termination of pregnancy  was tx with zoloft     Diabetes mellitus ([facility] [address] )     prediabetic    Gestational hypertension 2011    third trimester with her 1st baby  del @ 38 weeks    High-risk pregnancy     HX DVT (deep venous thrombosis) (HCC)     in right axilla -- complication from thoracic outlet syndrome    PCOS (polycystic ovarian syndrome) 2010    initally diagnosed in 2010 tx with metformin  pt d/c med and then restarted in 2016    Pituitary microadenoma ([facility] [address] ) 2009    last MRI in 2009    Prediabetes 2016    'impaired fasting glucose'     Thoracic outlet syndrome 2004    in 2004 per [facility]; has surgical repair     Present on Admission:   Postpartum endometritis   Gestational hypertension, third trimester   (Resolved) Migraine      Admitting Diagnosis: Endometritis [N71 9]  Fever [R50 9]  Pyrexia of unknown origin following delivery [O86 4]  Age/Sex: 39 y o  female  Admission Orders:  Scheduled Medications:  ampicillin-sulbactam, 3 g, Intravenous, Q6H  enoxaparin, 40 mg, Subcutaneous, Q12H [address]  levothyroxine, 75 mcg, Oral, Daily  pantoprazole, 40 mg, Oral, Early Morning      Continuous IV Infusions:  lactated ringers, 125 mL/hr, Intravenous, Continuous      PRN Meds:  acetaminophen, 975 mg, Oral, Q8H PRN  butalbital-acetaminophen-caffeine, 2 tablet, Oral, Q8H PRN  diphenhydrAMINE, 50 mg, Oral, Q8H PRN  docusate sodium, 100 mg, Oral, BID PRN  ibuprofen, 600 mg, Oral, Q6H PRN  polyethylene glycol, 17 g, Oral, Daily PRN        IP CONSULT TO OB GYN  IP CONSULT TO INFECTIOUS DISEASES    Network Utilization Review Department  ATTENTION: Please call with any questions or concerns to [phone] and carefully listen to the prompts so that you are directed to the right person   All voicemails are confidential   Karin June all requests for admission clinical reviews, approved or denied determinations and any other requests to dedicated fax number below belonging to the campus where the patient is receiving treatment   List of dedicated fax numbers for the Facilities:  [address] DENIALS (Administrative/Medical Necessity) [phone]   [address]  (Maternity/NICU/Pediatrics) [phone] [address]  [address] [facility] [place] [address] [address] [place] [address] [address] [facility] [place] [address] [phone]

## 2022-03-28 NOTE — UTILIZATION REVIEW
Inpatient Admission Authorization Request   NOTIFICATION OF INPATIENT ADMISSION/INPATIENT AUTHORIZATION REQUEST   SERVICING FACILITY:   [address] [address]  Tax ID: [id number]  NPI: [id number]  Place of Service: Inpatient [address] [facility]  [address]  Place of Service Code: 24     ATTENDING PROVIDER:  Attending Name and NPI#: Marta Fay, [place] [[id number]]  Address: [address] [place] [address]  Phone: [phone]     UTILIZATION REVIEW CONTACT:  Charlene Small Utilization   Network Utilization Review Department  Phone: [phone]  Fax [phone]  Email: [email or web address]     PHYSICIAN ADVISORY SERVICES:  FOR MYSY-TM-JHOI REVIEW - MEDICAL NECESSITY DENIAL  Phone: [phone]  Fax: [phone]  Email: [email or web address]     TYPE OF REQUEST:  Inpatient Status     ADMISSION INFORMATION:  ADMISSION DATE/TIME: 3/26/22  1:55 AM  PATIENT DIAGNOSIS CODE/DESCRIPTION:  Endometritis [N71 9]  Fever [R50 9]  Pyrexia of unknown origin following delivery [O86 4]  DISCHARGE DATE/TIME: 3/28/2022  3:05 PM   IMPORTANT INFORMATION:  Please contact the Charlene Small directly with any questions or concerns regarding this request  Department voicemails are confidential     Send requests for admission clinical reviews, concurrent reviews, approvals, and administrative denials due to lack of clinical to fax [phone]

## 2022-03-28 NOTE — PLAN OF CARE
Problem: POSTPARTUM  Goal: Experiences normal postpartum course  Description: INTERVENTIONS:  - Monitor maternal vital signs  - Assess uterine involution and lochia  3/28/2022 1338 by Shaka Kaur RN  Outcome: Completed  3/28/2022 0832 by Shaka Kaur RN  Outcome: Progressing  Goal: Incision(s), wounds(s) or drain site(s) healing without S/S of infection  Description: INTERVENTIONS  - Assess and document dressing, incision, wound bed, drain sites and surrounding tissue  - Provide patient and family education  3/28/2022 1338 by Shaka Kaur RN  Outcome: Completed  3/28/2022 0832 by Shaka Kaur RN  Outcome: Progressing

## 2022-03-28 NOTE — PROGRESS NOTES
Progress Note - OB/GYN  Post-Partum Physician Note   Karlie Thomson 39 y o  female MRN: [id number]  Unit/Bed#: -01 Encounter: [id number]    Assessment:  39y o  year-old , postpartum day #7 s/p , now HD#3 following readmission for postpartum endometritis    Plan:     Postpartum endometritis  - Diagnosed based on fever to 102 F, fundal tenderness, and leukocytosis  No evidence of respiratory infection, mastitis, VTE, or pyelonephritis  - Consideration has been given to possibility of retained products of conception, for which suspicion is currently low  Pelvic ultrasound images reviewed and overall unremarkable  Lochia is now normal    - Given persistent fever > 24 hours with temp of 103F overnight, Infectious Disease consult requested  Case discussed with ID (Dr Cb Barth) by phone this morning  Formal consult pending   - Will continue Unasyn 3 g IV q6h until afebrile > 24 hours and with clinical improvement, per [facility] protocol    - TMax 103 1 F at 12:29 yesterday  Last fever 103F at 03:06 this morning  Continue routine vitals  - Leukocytosis down trending, with WBC 14 67 at time of admission -> 10 25 this morning  Repeat CBC ordered for tomorrow AM   - Blood cultures x 2 negative at 24 hours (preliminary)  Urine culture shows no growth (final)  - Continue to trend fever curve and leukocytosis  Leukocytosis has resolved and she is now 32 hours since her last fever  Pt feels well  Will give scheduled dose of Unasyn at 1300 and then will D/C to home  History of DVT (deep vein thrombosis)  - Will continue Lovenox ppx while inpatient, given increased risk of VTE in setting of infection  Has declined outpatient postpartum Lovenox, but agreeable to administration while in-house  - SCDs while in bed       Gestational hypertension, third trimester  - Blood pressures normal to mild-range since time of readmission, with elevations noted during episodes of fever   - CBC sent this morning shows stable hemoglobin and platelets  Will add CMP to assess for derangements suggestive of pre-eclampsia with severe features  Labs have not shown evidence of severe features  Bps remain in the mildly elevated range  No role at this time for antihypertensives    Migraine  - Patient with persistent headache since yesterday, which improved slightly with oral compazine last night  Patient reports that headache is consistent with her prior migraine headaches  Denies visual changes, CP, SOB, RUQ pain, or peripheral swelling  Headache felt to be most consistent with migraine  Pre-eclampsia with severe features felt to be less likely  - Will administer Fioricet and Benadryl now  Patient states that she was previously on Nurtec for migraine prior to pregnancy, which is breastfeeding category L3  Nurtec unfortunately not on formulary   - Patient seen and evaluated by Anesthesia (Dr Marino Turner) this morning  Headache not felt to be due to complication of neuraxial anesthesia       HA resolved    _________________________________    Subjective:   Feeling well this AM without complaints    Pain: Minimal  Tolerating Oral Intake: Yes  Voiding: Yes  Ambulating: Yes  Breastfeeding: Yes - pumping  Chest Pain: No  Shortness of Breath: No  Leg Pain/Discomfort: No  Lochia: Scant    Objective:   Vitals:    03/27/22 1915 03/27/22 2317 03/28/22 0313 03/28/22 0700   BP: 133/91 144/97 121/84 149/85   BP Location: Left arm Left arm Left arm Left arm   Pulse: 90 87 66 82   Resp: 16 18 16 18   Temp: 97 7 °F (36 5 °C) 98 9 °F (37 2 °C) 98 7 °F (37 1 °C) 97 9 °F (36 6 °C)   TempSrc: Temporal Oral Oral Temporal   SpO2: 97% 99% 98% 98%   Weight:       Height:           Intake/Output Summary (Last 24 hours) at 3/28/2022 1107  Last data filed at 3/27/2022 1615  Gross per 24 hour   Intake 900 ml   Output --   Net 900 ml       Physical Exam:  General: in no apparent distress, alert, oriented times 3 and afebrile  Neck: No nuchal rigidity  Lungs: CTA throughout  CVS: RRR, no crackles, wheezes, rhonchi, or rales  Back: No spinal tenderness, no CVA tenderness  Abdomen: Soft, mild diffuse lower abdominal tenderness without rebound or guarding  Fundus: Firm, 1 below the umbilicus, non tender  Lower extremeties: non-tender, no edema, no calf tenderness       Labs/Tests:   Lab Results   Component Value Date/Time    HGB 9 6 (L) 03/28/2022 06:08 AM    HGB 10 4 (L) 03/27/2022 03:31 AM    HGB 10 2 (L) 03/26/2022 06:13 AM    HGB 10 6 (L) 03/25/2022 11:24 PM     03/28/2022 06:08 AM     03/27/2022 03:31 AM     03/26/2022 06:13 AM     03/25/2022 11:24 PM    WBC 8 71 03/28/2022 06:08 AM    WBC 10 25 (H) 03/27/2022 03:31 AM    WBC 11 76 (H) 03/26/2022 06:13 AM    WBC 14 67 (H) 03/25/2022 11:24 PM    CREATININE 0 64 03/28/2022 06:08 AM    CREATININE 0 64 03/27/2022 09:24 AM    CREATININE 0 70 03/25/2022 11:24 PM    ALT 24 03/28/2022 06:08 AM    ALT 28 03/27/2022 09:24 AM    ALT 27 03/25/2022 11:24 PM    AST 14 03/28/2022 06:08 AM    AST 20 03/27/2022 09:24 AM    AST 21 03/25/2022 11:24 PM    PTT 30 03/25/2022 11:24 PM    INR 1 04 03/25/2022 11:24 PM        Brief OB Lab review:  ABO Grouping   Date Value Ref Range Status   03/21/2022 A  Final      Rh Factor   Date Value Ref Range Status   03/21/2022 Positive  Final    No results found for: ANTIBODYSCR  No results found for: RUBM    MEDS:   Current Facility-Administered Medications   Medication Dose Route Frequency    acetaminophen (TYLENOL) tablet 975 mg  975 mg Oral Q8H PRN    ampicillin-sulbactam (UNASYN) in sodium chloride 0 9% 3 g  3 g Intravenous Q6H    butalbital-acetaminophen-caffeine (FIORICET,ESGIC) -40 mg per tablet 2 tablet  2 tablet Oral Q8H PRN    diphenhydrAMINE (BENADRYL) tablet 50 mg  50 mg Oral Q8H PRN    docusate sodium (COLACE) capsule 100 mg  100 mg Oral BID PRN    enoxaparin (LOVENOX) subcutaneous injection 40 mg  40 mg Subcutaneous Q12H [address]    ibuprofen (MOTRIN) tablet 600 mg  600 mg Oral Q6H PRN    lactated ringers infusion  125 mL/hr Intravenous Continuous    levothyroxine tablet 75 mcg  75 mcg Oral Daily    pantoprazole (PROTONIX) EC tablet 40 mg  40 mg Oral Early Morning    polyethylene glycol (MIRALAX) packet 17 g  17 g Oral Daily PRN     Invasive Devices  Report    Peripheral Intravenous Line            Peripheral IV 03/26/22 Right;Ventral (anterior) Forearm 2 days                  Colin Wood MD  3/28/2022 11:07 AM

## 2022-03-30 ENCOUNTER — CLINICAL SUPPORT (OUTPATIENT)
Dept: OBGYN CLINIC | Facility: CLINIC | Age: 37
End: 2022-03-30

## 2022-03-30 VITALS
DIASTOLIC BLOOD PRESSURE: 88 MMHG | BODY MASS INDEX: 31.82 KG/M2 | WEIGHT: 191 LBS | SYSTOLIC BLOOD PRESSURE: 122 MMHG | HEIGHT: 65 IN

## 2022-03-30 DIAGNOSIS — O13.3 GESTATIONAL HYPERTENSION, THIRD TRIMESTER: Primary | ICD-10-CM

## 2022-03-30 NOTE — PROGRESS NOTES
Pt delivered on 3/21/22 and scheduled for  BP check  /88, pt denies any headache, visual disturbances, swelling to face, hands or extremities, N/V or RUQ discomfort  Discussed with Dr Barrera Martínez and have pt continue to monitor BP and call if /90

## 2022-03-31 LAB
BACTERIA BLD CULT: NORMAL
BACTERIA BLD CULT: NORMAL

## 2022-04-06 ENCOUNTER — HOSPITAL ENCOUNTER (INPATIENT)
Facility: HOSPITAL | Age: 37
LOS: 2 days | Discharge: HOME | DRG: 776 | End: 2022-04-08
Attending: OBSTETRICS & GYNECOLOGY | Admitting: OBSTETRICS & GYNECOLOGY
Payer: COMMERCIAL

## 2022-04-06 ENCOUNTER — TELEPHONE (OUTPATIENT)
Dept: OBGYN CLINIC | Facility: CLINIC | Age: 37
End: 2022-04-06

## 2022-04-06 DIAGNOSIS — G43.009 MIGRAINE WITHOUT AURA AND WITHOUT STATUS MIGRAINOSUS, NOT INTRACTABLE: ICD-10-CM

## 2022-04-06 LAB
ALBUMIN SERPL BCP-MCNC: 2.9 G/DL (ref 3.5–5)
ALP SERPL-CCNC: 108 U/L (ref 46–116)
ALT SERPL W P-5'-P-CCNC: 30 U/L (ref 12–78)
ANION GAP SERPL CALCULATED.3IONS-SCNC: 8 MMOL/L (ref 4–13)
AST SERPL W P-5'-P-CCNC: 11 U/L (ref 5–45)
BILIRUB SERPL-MCNC: 0.2 MG/DL (ref 0.2–1)
BUN SERPL-MCNC: 20 MG/DL (ref 5–25)
CALCIUM ALBUM COR SERPL-MCNC: 9.8 MG/DL (ref 8.3–10.1)
CALCIUM SERPL-MCNC: 8.9 MG/DL (ref 8.3–10.1)
CHLORIDE SERPL-SCNC: 104 MMOL/L (ref 100–108)
CO2 SERPL-SCNC: 29 MMOL/L (ref 21–32)
CREAT SERPL-MCNC: 0.83 MG/DL (ref 0.6–1.3)
ERYTHROCYTE [DISTWIDTH] IN BLOOD BY AUTOMATED COUNT: 12.7 % (ref 11.6–15.1)
GFR SERPL CREATININE-BSD FRML MDRD: 90 ML/MIN/1.73SQ M
GLUCOSE SERPL-MCNC: 90 MG/DL (ref 65–140)
HCT VFR BLD AUTO: 35.4 % (ref 34.8–46.1)
HGB BLD-MCNC: 11.3 G/DL (ref 11.5–15.4)
MCH RBC QN AUTO: 29.1 PG (ref 26.8–34.3)
MCHC RBC AUTO-ENTMCNC: 31.9 G/DL (ref 31.4–37.4)
MCV RBC AUTO: 91 FL (ref 82–98)
PLATELET # BLD AUTO: 387 THOUSANDS/UL (ref 149–390)
PMV BLD AUTO: 9.7 FL (ref 8.9–12.7)
POTASSIUM SERPL-SCNC: 3.9 MMOL/L (ref 3.5–5.3)
PROT SERPL-MCNC: 6.6 G/DL (ref 6.4–8.2)
RBC # BLD AUTO: 3.88 MILLION/UL (ref 3.81–5.12)
SODIUM SERPL-SCNC: 141 MMOL/L (ref 136–145)
WBC # BLD AUTO: 10.15 THOUSAND/UL (ref 4.31–10.16)

## 2022-04-06 PROCEDURE — 99215 OFFICE O/P EST HI 40 MIN: CPT

## 2022-04-06 PROCEDURE — 80053 COMPREHEN METABOLIC PANEL: CPT | Performed by: OBSTETRICS & GYNECOLOGY

## 2022-04-06 PROCEDURE — NC001 PR NO CHARGE: Performed by: OBSTETRICS & GYNECOLOGY

## 2022-04-06 PROCEDURE — 85027 COMPLETE CBC AUTOMATED: CPT | Performed by: OBSTETRICS & GYNECOLOGY

## 2022-04-06 RX ORDER — BUTALBITAL, ACETAMINOPHEN AND CAFFEINE 50; 325; 40 MG/1; MG/1; MG/1
1 TABLET ORAL EVERY 4 HOURS PRN
Status: DISCONTINUED | OUTPATIENT
Start: 2022-04-06 | End: 2022-04-06

## 2022-04-06 RX ORDER — ACETAMINOPHEN 325 MG/1
650 TABLET ORAL EVERY 6 HOURS PRN
Status: DISCONTINUED | OUTPATIENT
Start: 2022-04-06 | End: 2022-04-06

## 2022-04-06 RX ORDER — SODIUM CHLORIDE, SODIUM LACTATE, POTASSIUM CHLORIDE, CALCIUM CHLORIDE 600; 310; 30; 20 MG/100ML; MG/100ML; MG/100ML; MG/100ML
75 INJECTION, SOLUTION INTRAVENOUS CONTINUOUS
Status: DISCONTINUED | OUTPATIENT
Start: 2022-04-06 | End: 2022-04-08 | Stop reason: HOSPADM

## 2022-04-06 RX ORDER — CALCIUM GLUCONATE 94 MG/ML
1 INJECTION, SOLUTION INTRAVENOUS ONCE AS NEEDED
Status: DISCONTINUED | OUTPATIENT
Start: 2022-04-06 | End: 2022-04-08 | Stop reason: HOSPADM

## 2022-04-06 RX ORDER — ACETAMINOPHEN 325 MG/1
650 TABLET ORAL EVERY 6 HOURS PRN
Status: DISCONTINUED | OUTPATIENT
Start: 2022-04-06 | End: 2022-04-08 | Stop reason: HOSPADM

## 2022-04-06 RX ORDER — MAGNESIUM SULFATE HEPTAHYDRATE 40 MG/ML
4 INJECTION, SOLUTION INTRAVENOUS ONCE
Status: COMPLETED | OUTPATIENT
Start: 2022-04-06 | End: 2022-04-06

## 2022-04-06 RX ORDER — MAGNESIUM SULFATE HEPTAHYDRATE 40 MG/ML
2 INJECTION, SOLUTION INTRAVENOUS CONTINUOUS
Status: DISCONTINUED | OUTPATIENT
Start: 2022-04-06 | End: 2022-04-08 | Stop reason: HOSPADM

## 2022-04-06 RX ORDER — METOCLOPRAMIDE 10 MG/1
10 TABLET ORAL ONCE
Status: COMPLETED | OUTPATIENT
Start: 2022-04-06 | End: 2022-04-06

## 2022-04-06 RX ORDER — DIPHENHYDRAMINE HCL 25 MG
25 TABLET ORAL ONCE
Status: COMPLETED | OUTPATIENT
Start: 2022-04-06 | End: 2022-04-06

## 2022-04-06 RX ADMIN — MAGNESIUM SULFATE HEPTAHYDRATE 2 G/HR: 40 INJECTION, SOLUTION INTRAVENOUS at 20:07

## 2022-04-06 RX ADMIN — SODIUM CHLORIDE, SODIUM LACTATE, POTASSIUM CHLORIDE, AND CALCIUM CHLORIDE 75 ML/HR: .6; .31; .03; .02 INJECTION, SOLUTION INTRAVENOUS at 19:26

## 2022-04-06 RX ADMIN — DIPHENHYDRAMINE HYDROCHLORIDE 25 MG: 25 TABLET ORAL at 17:41

## 2022-04-06 RX ADMIN — MAGNESIUM SULFATE HEPTAHYDRATE 4 G: 40 INJECTION, SOLUTION INTRAVENOUS at 19:33

## 2022-04-06 RX ADMIN — METOCLOPRAMIDE 10 MG: 10 TABLET ORAL at 17:41

## 2022-04-06 RX ADMIN — BUTALBITAL, ACETAMINOPHEN AND CAFFEINE 1 TABLET: 50; 325; 40 TABLET ORAL at 17:41

## 2022-04-06 NOTE — H&P
History & Physical - OB/GYN   Ashley Thomson 39 y o  female MRN: [id number]  Unit/Bed#: LD TRIAGE  Encounter: [id number]    39 y o  yo [id number]  She is PPD # 16 s/p   She has a history of GHTN and  presents today after having elevated BP readings at home and headache for 4 days  She says Bps were 150s/100s  HA was frontal, bilateral, and radiates to occipital region  She has h/o migraines and has been taking benadryl and motrin at home for HA with no relief  Some occasional spots in vision  She was given Fioricet/reglan and benadryl here with mild relief  She was induced at 37 weeks for gestational hypertension  Bps were mild range around the time of delivery  She has not been taking any anti-hypertensives  She was readmitted with postpartum endometritis and treated with unasyn 3/26 - 3/28  Pregnancy Complications:  Patient Active Problem List   Diagnosis    Bicuspid aortic valve    History of DVT (deep vein thrombosis)    Thoracic outlet syndrome    Syncopal episodes    Insulin resistance    Personal history of COVID-19    COVID-19 affecting pregnancy in third trimester    Hereditary disease in family possibly affecting fetus    Subclinical hypothyroidism    Gestational hypertension, third trimester    Depression    High risk pregnancy, antepartum    Elevated glucose tolerance test    Iron deficiency anemia secondary to inadequate dietary iron intake    Gestational hypertension    Normal spontaneous vaginal delivery    Postpartum endometritis       PMH:  Past Medical History:   Diagnosis Date    Acid reflux     Bicuspid aortic valve     COVID-19 affecting pregnancy in second trimester 2021    Depression     in  after termination of pregnancy  was tx with zoloft     Diabetes mellitus ([address] )     prediabetic    Gestational hypertension     third trimester with her 1st baby   del @ 38 weeks    High-risk pregnancy     HX DVT (deep venous thrombosis) ([address] )     in right axilla -- complication from thoracic outlet syndrome    PCOS (polycystic ovarian syndrome)     initally diagnosed in  tx with metformin  pt d/c med and then restarted in 2016    Pituitary microadenoma [facility])     last MRI in     Prediabetes 2016    'impaired fasting glucose'     Thoracic outlet syndrome 2004    in  per [facility] records; has surgical repair       PSH:  Past Surgical History:   Procedure Laterality Date    BONE RESECTION, RIB Right        Social Hx:  Lives with  and children  OB Hx:  OB History    Para Term  AB Living   3 2 2 0 1 2   SAB IAB Ectopic Multiple Live Births   0 1 0 0 2      # Outcome Date GA Lbr Cuong/2nd Weight Sex Delivery Anes PTL Lv   3 Term 22 37w0d / 00:03 3100 g (6 lb 13 4 oz) F Vag-Spont EPI N OLIVIA      Name: Ankur Gray ([address])      [facility]: 8  Apgar5: 9   2 Term 12 38w0d  3430 g (7 lb 9 oz)  Vag-Spont EPI N OLIVIA      Birth Comments: gestational HTN   1 IAB 04     TAB          Meds:  No current facility-administered medications on file prior to encounter       Current Outpatient Medications on File Prior to Encounter   Medication Sig Dispense Refill    acetaminophen (TYLENOL) 325 mg tablet Take 2 tablets (650 mg total) by mouth every 4 (four) hours as needed for mild pain  0    docusate sodium (COLACE) 100 mg capsule Take 1 capsule (100 mg total) by mouth 2 (two) times a day as needed for constipation (Patient not taking: Reported on 3/25/2022 )  0    EPINEPHrine (EPIPEN) 0 3 mg/0 3 mL SOAJ Inject 0 3 mg into a muscle every 8 (eight) hours as needed for anaphylaxis      Ferrous Sulfate ER (Slow Fe) 142 (45 Fe) MG TBCR Take 1 tablet by mouth in the morning (Patient not taking: Reported on 3/25/2022 )  0    ibuprofen (MOTRIN) 600 mg tablet Take 1 tablet (600 mg total) by mouth every 6 (six) hours as needed for mild pain 30 tablet 0    levothyroxine (Synthroid) 75 mcg tablet Take 1 tablet (75 mcg total) by mouth daily 30 tablet 3    omeprazole (PriLOSEC) 20 mg delayed release capsule Take 20 mg by mouth      Prenatal Vit-Fe Fumarate-FA (PRENATAL 1+1 PO) Take 1 tablet by mouth daily      witch hazel-glycerin (TUCKS) topical pad Apply 1 pad topically every 4 (four) hours as needed for irritation  0       Allergies: Allergies   Allergen Reactions    Gadobutrol Cough and Itching    Iv Contrast [Iodinated Diagnostic Agents] Shortness Of Breath and Throat Swelling     Pt stated this was from 'years and years' ago and has not had it since    Nuts - Food Allergy Anaphylaxis    Shellfish-Derived Products - Food Allergy Anaphylaxis    Shrimp Extract Allergy Skin Test - Food Allergy Headache    Venlafaxine Anaphylaxis     Per [facility] records    Hydromorphone Itching     During labor had reaction    Morphine Itching    Peanut Oil - Food Allergy Other (See Comments)     Itching, trouble breathing       Labs:  Lab Results   Component Value Date    WBC 10 15 2022    HGB 11 3 (L) 2022    HCT 35 4 2022    MCV 91 2022     2022     Lab Results   Component Value Date    SODIUM 141 2022    K 3 9 2022     2022    CO2 29 2022    AGAP 8 2022    BUN 20 2022    CREATININE 0 83 2022    GLUC 90 2022    CALCIUM 8 9 2022    AST 11 2022    ALT 30 2022    ALKPHOS 108 2022    TP 6 6 2022    TBILI 0 20 2022    EGFR 90 2022       Physical Exam:  BP (!) 148/105   Pulse 59   Temp 99 2 °F (37 3 °C) (Temporal)   Resp 16   LMP 2021 (Exact Date)   SpO2 98%       Physical Exam:  HEENT: atraumatic, normocephalic  Heart: RRR, NMRG  Lungs: CTA b/l, no wrr  Abdomen: gravid, non-tender, non-distended  Extremities: non-tender, no edema  Neuro: reflexes 2+    Assessment:   39 y o  [id number] PPD # 16 s/p    Onset of  preeclampsia with severe features by HA/vision changes  Plan:   Admit to L&D  Start Magnesium infusion for seizure prophylaxis  Monitor BP  May consider starting antihypertensive medication  Discussed with Dr Luke Adamson

## 2022-04-06 NOTE — TELEPHONE ENCOUNTER
Ramiro Veliz left a message on the nurses line regarding blood pressure checks and having a headache  Called patient back to discuss symptoms and blood pressure readings  Pt was seen in the office on 3/30/22 for a blood pressure check  In the office her BP was 122/88  Pt is currently 2 weeks pp and she forgot to take her blood pressure since being in the office on 4/30  Pt was feeling off yesterday with a headache and she took her BP last evening  BP last night was 151/107, and today several readings have been 148/105  Pt's headache is still persistent and she is feeling funny in her head  She has tried tylenol, increasing her water intake, caffeine, and benadryl incase it was allergy related  The pt reports none of those interventions have helped relieve or resolve the headache  Pt denies blurry vision, seeing black floating spots, or flashes of light in her vision as well  She was not discharged home from the hospital on blood pressure medication  No CHTN, just Gestation HTN with the pregnancy  Pt said she did feel nauseas yesterday but not today  Pt also denies swelling  Call placed to Dr Brian Castillo letting her know the pt's blood pressure readings, and how she is feeling  Dr Brian Castillo would like the patient to be seen at L&D at [address] for evaluation and lab work  Called pt back after speaking with Dr Brian Castillo and let her know Dr Brian Castillo would like the patient to be evaluated at the L&D unit at [address]  Pt aware and will head to L&D for evaluation within the hour when she has coverage for her 3week old infant at home

## 2022-04-07 LAB
ALBUMIN SERPL BCP-MCNC: 3 G/DL (ref 3.5–5)
ALP SERPL-CCNC: 115 U/L (ref 46–116)
ALT SERPL W P-5'-P-CCNC: 30 U/L (ref 12–78)
ANION GAP SERPL CALCULATED.3IONS-SCNC: 9 MMOL/L (ref 4–13)
APTT PPP: 28 SECONDS (ref 23–37)
AST SERPL W P-5'-P-CCNC: 13 U/L (ref 5–45)
BILIRUB SERPL-MCNC: 0.2 MG/DL (ref 0.2–1)
BUN SERPL-MCNC: 16 MG/DL (ref 5–25)
CALCIUM ALBUM COR SERPL-MCNC: 8.3 MG/DL (ref 8.3–10.1)
CALCIUM SERPL-MCNC: 7.5 MG/DL (ref 8.3–10.1)
CHLORIDE SERPL-SCNC: 101 MMOL/L (ref 100–108)
CO2 SERPL-SCNC: 27 MMOL/L (ref 21–32)
CREAT SERPL-MCNC: 0.82 MG/DL (ref 0.6–1.3)
ERYTHROCYTE [DISTWIDTH] IN BLOOD BY AUTOMATED COUNT: 12.7 % (ref 11.6–15.1)
GFR SERPL CREATININE-BSD FRML MDRD: 91 ML/MIN/1.73SQ M
GLUCOSE SERPL-MCNC: 94 MG/DL (ref 65–140)
HCT VFR BLD AUTO: 36.3 % (ref 34.8–46.1)
HGB BLD-MCNC: 11.6 G/DL (ref 11.5–15.4)
INR PPP: 1 (ref 0.84–1.19)
MCH RBC QN AUTO: 29.3 PG (ref 26.8–34.3)
MCHC RBC AUTO-ENTMCNC: 32 G/DL (ref 31.4–37.4)
MCV RBC AUTO: 92 FL (ref 82–98)
PLATELET # BLD AUTO: 389 THOUSANDS/UL (ref 149–390)
PMV BLD AUTO: 9.7 FL (ref 8.9–12.7)
POTASSIUM SERPL-SCNC: 3.9 MMOL/L (ref 3.5–5.3)
PROT SERPL-MCNC: 6.7 G/DL (ref 6.4–8.2)
PROTHROMBIN TIME: 13.1 SECONDS (ref 11.6–14.5)
RBC # BLD AUTO: 3.96 MILLION/UL (ref 3.81–5.12)
SODIUM SERPL-SCNC: 137 MMOL/L (ref 136–145)
URATE SERPL-MCNC: 5 MG/DL (ref 2–6.8)
WBC # BLD AUTO: 10.15 THOUSAND/UL (ref 4.31–10.16)

## 2022-04-07 PROCEDURE — 85730 THROMBOPLASTIN TIME PARTIAL: CPT | Performed by: OBSTETRICS & GYNECOLOGY

## 2022-04-07 PROCEDURE — 99232 SBSQ HOSP IP/OBS MODERATE 35: CPT | Performed by: OBSTETRICS & GYNECOLOGY

## 2022-04-07 PROCEDURE — 84550 ASSAY OF BLOOD/URIC ACID: CPT | Performed by: OBSTETRICS & GYNECOLOGY

## 2022-04-07 PROCEDURE — 85610 PROTHROMBIN TIME: CPT | Performed by: OBSTETRICS & GYNECOLOGY

## 2022-04-07 PROCEDURE — 80053 COMPREHEN METABOLIC PANEL: CPT | Performed by: OBSTETRICS & GYNECOLOGY

## 2022-04-07 PROCEDURE — 85027 COMPLETE CBC AUTOMATED: CPT | Performed by: OBSTETRICS & GYNECOLOGY

## 2022-04-07 PROCEDURE — NC001 PR NO CHARGE: Performed by: OBSTETRICS & GYNECOLOGY

## 2022-04-07 RX ORDER — IBUPROFEN 600 MG/1
600 TABLET ORAL EVERY 6 HOURS PRN
Status: DISCONTINUED | OUTPATIENT
Start: 2022-04-07 | End: 2022-04-07

## 2022-04-07 RX ORDER — METOCLOPRAMIDE HYDROCHLORIDE 5 MG/ML
10 INJECTION INTRAMUSCULAR; INTRAVENOUS ONCE
Status: COMPLETED | OUTPATIENT
Start: 2022-04-07 | End: 2022-04-07

## 2022-04-07 RX ORDER — LEVOTHYROXINE SODIUM 0.03 MG/1
75 TABLET ORAL
Status: DISCONTINUED | OUTPATIENT
Start: 2022-04-07 | End: 2022-04-08 | Stop reason: HOSPADM

## 2022-04-07 RX ORDER — DIPHENHYDRAMINE HCL 25 MG
25 TABLET ORAL EVERY 6 HOURS PRN
Status: DISCONTINUED | OUTPATIENT
Start: 2022-04-07 | End: 2022-04-08 | Stop reason: HOSPADM

## 2022-04-07 RX ORDER — SUMATRIPTAN 50 MG/1
50 TABLET, FILM COATED ORAL ONCE
Status: COMPLETED | OUTPATIENT
Start: 2022-04-07 | End: 2022-04-07

## 2022-04-07 RX ORDER — NIFEDIPINE 30 MG/1
30 TABLET, EXTENDED RELEASE ORAL DAILY
Status: DISCONTINUED | OUTPATIENT
Start: 2022-04-07 | End: 2022-04-08 | Stop reason: HOSPADM

## 2022-04-07 RX ORDER — KETOROLAC TROMETHAMINE 30 MG/ML
30 INJECTION, SOLUTION INTRAMUSCULAR; INTRAVENOUS ONCE
Status: COMPLETED | OUTPATIENT
Start: 2022-04-07 | End: 2022-04-07

## 2022-04-07 RX ORDER — METHYLPREDNISOLONE SODIUM SUCCINATE 40 MG/ML
40 INJECTION, POWDER, LYOPHILIZED, FOR SOLUTION INTRAMUSCULAR; INTRAVENOUS ONCE
Status: COMPLETED | OUTPATIENT
Start: 2022-04-07 | End: 2022-04-07

## 2022-04-07 RX ORDER — IBUPROFEN 600 MG/1
600 TABLET ORAL EVERY 6 HOURS PRN
Status: DISCONTINUED | OUTPATIENT
Start: 2022-04-07 | End: 2022-04-08 | Stop reason: HOSPADM

## 2022-04-07 RX ADMIN — KETOROLAC TROMETHAMINE 30 MG: 30 INJECTION, SOLUTION INTRAMUSCULAR; INTRAVENOUS at 13:46

## 2022-04-07 RX ADMIN — MAGNESIUM SULFATE HEPTAHYDRATE 2 G/HR: 40 INJECTION, SOLUTION INTRAVENOUS at 06:07

## 2022-04-07 RX ADMIN — MAGNESIUM SULFATE HEPTAHYDRATE 2 G/HR: 40 INJECTION, SOLUTION INTRAVENOUS at 15:56

## 2022-04-07 RX ADMIN — SODIUM CHLORIDE, SODIUM LACTATE, POTASSIUM CHLORIDE, AND CALCIUM CHLORIDE 75 ML/HR: .6; .31; .03; .02 INJECTION, SOLUTION INTRAVENOUS at 08:38

## 2022-04-07 RX ADMIN — NIFEDIPINE 30 MG: 30 TABLET, FILM COATED, EXTENDED RELEASE ORAL at 10:07

## 2022-04-07 RX ADMIN — LEVOTHYROXINE SODIUM 75 MCG: 25 TABLET ORAL at 08:00

## 2022-04-07 RX ADMIN — METOCLOPRAMIDE 10 MG: 5 INJECTION, SOLUTION INTRAMUSCULAR; INTRAVENOUS at 13:45

## 2022-04-07 RX ADMIN — METHYLPREDNISOLONE SODIUM SUCCINATE 40 MG: 40 INJECTION, POWDER, FOR SOLUTION INTRAMUSCULAR; INTRAVENOUS at 13:45

## 2022-04-07 RX ADMIN — RIMEGEPANT SULFATE 75 MG: 75 TABLET, ORALLY DISINTEGRATING ORAL at 10:06

## 2022-04-07 RX ADMIN — SUMATRIPTAN SUCCINATE 50 MG: 50 TABLET ORAL at 22:12

## 2022-04-07 RX ADMIN — ACETAMINOPHEN 650 MG: 325 TABLET ORAL at 03:56

## 2022-04-07 RX ADMIN — DIPHENHYDRAMINE HYDROCHLORIDE 25 MG: 25 TABLET ORAL at 08:33

## 2022-04-07 RX ADMIN — IBUPROFEN 600 MG: 600 TABLET ORAL at 08:34

## 2022-04-07 NOTE — PROGRESS NOTES
Progress Note - OB/GYN  Post-Partum Physician Note   Ankit Thomson 39 y o  female MRN: [id number]  Unit/Bed#:  209-01 Encounter: [id number]    Patient is postpartum day 17 from a Vaginal, Spontaneous    She was readmitted yesterday with preeclampsia with severe features, based on elevated blood pressures and a headache    Subjective:  Pt still c/o headache, states about the same, but now describing as "my head just feels heavy"  No N/V, no abdominal pain          Objective:   Vitals:    04/07/22 1000 04/07/22 1155 04/07/22 1403 04/07/22 1545   BP: 127/97 140/97 142/91 134/91   BP Location: Left arm Left arm Left arm Right arm   Pulse: 77 69 75 74   Resp: 16 16 16 18   Temp:  97 6 °F (36 4 °C) 97 8 °F (36 6 °C) 97 7 °F (36 5 °C)   TempSrc:  Temporal Temporal Oral   SpO2: 96% 95% 96% 98%   Weight:       Height:           Intake/Output Summary (Last 24 hours) at 4/7/2022 1745  Last data filed at 4/7/2022 1740  Gross per 24 hour   Intake 2788 75 ml   Output 4900 ml   Net -2111 25 ml           Labs/Tests:   Lab Results   Component Value Date    WBC 10 15 04/07/2022    HGB 11 6 04/07/2022    HCT 36 3 04/07/2022    MCV 92 04/07/2022     04/07/2022         MEDS:   Current Facility-Administered Medications   Medication Dose Route Frequency    acetaminophen (TYLENOL) tablet 650 mg  650 mg Oral Q6H PRN    calcium gluconate 10 % injection 1 g  1 g Intravenous Once PRN    diphenhydrAMINE (BENADRYL) tablet 25 mg  25 mg Oral Q6H PRN    ibuprofen (MOTRIN) tablet 600 mg  600 mg Oral Q6H PRN    lactated ringers infusion  75 mL/hr Intravenous Continuous    levothyroxine tablet 75 mcg  75 mcg Oral Early Morning    magnesium sulfate 20 g/500 mL infusion (premix)  2 g/hr Intravenous Continuous    NIFEdipine (PROCARDIA XL) 24 hr tablet 30 mg  30 mg Oral Daily    Rimegepant Sulfate (NURTEC) disintegrating tablet TBDP 75 mg  75 mg Oral Daily PRN     Invasive Devices  Report    Peripheral Intravenous Line Peripheral IV 04/06/22 Left Antecubital <1 day                Assessment and Plan:  39y o  year-old [id number], postpartum day 16 with readmission for severe preeclampsia with headache, complicated by h/o migraines  Hypertension in pregnancy, preeclampsia, severe, delivered/postpartum  - pt had 2 severe range BP on day of admission but BP decrease spontaneously without IV medications  - admission labs normal  - started on magnesium sulfate for seizure prophylaxis at 19:00 on 4/6/2022  - Blood pressures all low 140s/90s starting Procardia XL 30mg  - Repeat labs this afternoon early  - still some HA even with multiple treatments, pt unsure if it's from magnesium  Magnesium to end tonight at 19:00  Migraine  - history of migraines prior to pregnancy  She has used Nurtec ODT prior to pregnancy    - she has received multiple treatments for HA since admission with minimal response  Day of admission she received Fioricet, IV reglan and benadryl on day of admission   - today she received Motrin and Benadryl at 8:30 - she reported her HA at 8:30 at 8/10   - At 10:00 she received Nurtec OTC (not on formulary but patient had brought from home), after no real improvement after Motrin and Benadryl  Headache to 5/10   - At 14:00 today received -  Methyprednisone 40mg IV, Reglan 10mg IV, and Toradol 30mg IV (she is continuing to received Magnesium Sulfate)   - pt overall feels not much better headache still saying 5/10, but feels may be due to magneisum    - Magnesium will be stopping at 19:00 tonight  We will wait to see how headache is overnight  Analgesia prn  If still not improving t/c MRI head tomorrow  Reviewed plan w/ pt who agrees             Corbin Ambrose MD

## 2022-04-07 NOTE — ASSESSMENT & PLAN NOTE
- pt had 2 severe range BP on day of admission but BP decrease spontaneously without IV medications  - admission labs normal  - started on magnesium sulfate for seizure prophylaxis at 19:00 on 4/6/2022  - Blood pressures all low 140s/90s starting Procardia XL 30mg  - Repeat labs this afternoon early  - still some HA even with multiple treatments, pt unsure if it's from magnesium  Magnesium to end tonight at 19:00

## 2022-04-07 NOTE — PLAN OF CARE
Problem: PAIN - ADULT  Goal: Verbalizes/displays adequate comfort level or baseline comfort level  Description: Interventions:  - Encourage patient to monitor pain and request assistance  - Assess pain using appropriate pain scale  - Administer analgesics based on type and severity of pain and evaluate response  - Implement non-pharmacological measures as appropriate and evaluate response  - Consider cultural and social influences on pain and pain management  - Notify physician/advanced practitioner if interventions unsuccessful or patient reports new pain  Outcome: Progressing     Problem: Knowledge Deficit  Goal: Patient/family/caregiver demonstrates understanding of disease process, treatment plan, medications, and discharge instructions  Description: Complete learning assessment and assess knowledge base    Interventions:  - Provide teaching at level of understanding  - Provide teaching via preferred learning methods  Outcome: Progressing     Problem: DISCHARGE PLANNING  Goal: Discharge to home or other facility with appropriate resources  Description: INTERVENTIONS:  - Identify barriers to discharge w/patient and caregiver  - Arrange for needed discharge resources and transportation as appropriate  - Identify discharge learning needs (meds, wound care, etc )  - Arrange for interpretive services to assist at discharge as needed  - Refer to Case Management Department for coordinating discharge planning if the patient needs post-hospital services based on physician/advanced practitioner order or complex needs related to functional status, cognitive ability, or social support system  Outcome: Progressing

## 2022-04-07 NOTE — QUICK NOTE
OB Hospitalist on Call    Called by nursing for /98 and persistent frontal headache  Patient reports feeling dizzy and headache over her eyes and moving to top of her head  Denies nausea/vomit/abdominal pain  Not sure if this is her migraine headache  Does report photophobia - blinds closed and lights dimmed  /97 (BP Location: Left arm)   Pulse 77   Temp 97 7 °F (36 5 °C) (Oral)   Resp 16   Ht 5' 5" (1 651 m)   Wt 85 3 kg (188 lb)   LMP 06/25/2021 (Exact Date)   SpO2 95%   Breastfeeding Yes   BMI 31 28 kg/m²     Abdomen - soft, nontender, nondistended, fundus nontender  Extremities - no edema    Reflexes - +2 lower extremities    Postpartum headache with elevated Bps - pre-eclampsia with severe features (BP criteria)  1  Migraine - give Nurtec now  2  Hypertension - begin titration of Procardia XL 30mg po - first dose now;  Depending on response, may need BID dosing tonight or increase to 60mg XL in AM   3   Magnesium sulfate - continue until 24 hours (approximately 1933 this evening)    Will watch Bps closely throughout today  Griselda Ochoa MD  OB/GYN Hospitalist  [phone]  4/7/2022   10:08 AM

## 2022-04-07 NOTE — PROGRESS NOTES
Progress Note - OB/GYN  Post-Partum Physician Note   Lucie Chetan Thomson 39 y o  female MRN: [id number]  Unit/Bed#: -01 Encounter: [id number]    Patient is postpartum day 17 from a Vaginal, Spontaneous    She was readmitted yesterday with preeclampsia with severe features, based on elevated blood pressures and a headache  Subjective: c/o continued headache - some improvement today from 8/10 to 5/10, but not resolved  Denies N/V, RUQ pain, epigastric pain    No abdominal pain, lochia normal     Objective:   Vitals:    04/07/22 0834 04/07/22 1000 04/07/22 1155 04/07/22 1403   BP: 147/98 127/97 140/97 142/91   BP Location: Right arm Left arm Left arm Left arm   Pulse: 83 77 69 75   Resp: 16 16 16 16   Temp: 97 7 °F (36 5 °C)  97 6 °F (36 4 °C) 97 8 °F (36 6 °C)   TempSrc: Oral  Temporal Temporal   SpO2: 95% 96% 95% 96%   Weight:       Height:           Intake/Output Summary (Last 24 hours) at 4/7/2022 1412  Last data filed at 4/7/2022 1401  Gross per 24 hour   Intake 2788 75 ml   Output 4100 ml   Net -1311 25 ml       Physical Exam:  GEN: NAD  CV: RRR  Pulm: breathing normally      Labs/Tests:   Lab Results   Component Value Date    WBC 10 15 04/07/2022    HGB 11 6 04/07/2022    HCT 36 3 04/07/2022    MCV 92 04/07/2022     04/07/2022       Brief OB Lab review:  ABO Grouping   Date Value Ref Range Status   03/21/2022 A  Final      Rh Factor   Date Value Ref Range Status   03/21/2022 Positive  Final    No results found for: ANTIBODYSCR  No results found for: RUBM    MEDS:   Current Facility-Administered Medications   Medication Dose Route Frequency    acetaminophen (TYLENOL) tablet 650 mg  650 mg Oral Q6H PRN    calcium gluconate 10 % injection 1 g  1 g Intravenous Once PRN    diphenhydrAMINE (BENADRYL) tablet 25 mg  25 mg Oral Q6H PRN    ibuprofen (MOTRIN) tablet 600 mg  600 mg Oral Q6H PRN    lactated ringers infusion  75 mL/hr Intravenous Continuous    levothyroxine tablet 75 mcg  75 mcg Oral Early Morning    magnesium sulfate 20 g/500 mL infusion (premix)  2 g/hr Intravenous Continuous    NIFEdipine (PROCARDIA XL) 24 hr tablet 30 mg  30 mg Oral Daily    Rimegepant Sulfate (NURTEC) disintegrating tablet TBDP 75 mg  75 mg Oral Daily PRN     Invasive Devices  Report    Peripheral Intravenous Line            Peripheral IV 04/06/22 Left Antecubital <1 day                Assessment and Plan:  39y o  year-old [id number], postpartum day 16 - with readmission for preeclampsia with severe features  Encourage ambulation  Regular diet  Hypertension in pregnancy, preeclampsia, severe, delivered/postpartum  - pt had 2 severe range BP on day of admission but BP decrease spontaneously without IV medications  - admission labs normal  - started on magnesium sulfate for seizure prophylaxis at 19:00 on 4/6/2022  - Blood pressures today 4/7/2022 were 140-150/90s and patient was started on PO Procardia XL 30mg at 10:00  - she still has a headache that has not completely resolved - it is difficult to know if this is from pre-eclampsia or her migraines - will repeat labs and continue working on headache control     - she has received multiple treatments for HA since admission with minimal response  Day of admission she received Fioricet, IV reglan and benadryl on day of admission   - today she received Motrin and Benadryl at 8:30 - she reported her HA at 8:30 at 8/10   - At 10:00 she received Nurtec OTC (not on formulary but patient had brought from home), after no real improvement after Motrin and Benadryl    Headache to 5/10   - now will give Methyprednisone 40mg IV, Reglan 10mg IV, and Toradol 30mg IV (she is continuing to received Magnesium Sulfate)          Roxi Sierra MD

## 2022-04-07 NOTE — UTILIZATION REVIEW
Initial Clinical Review    Admission: Date/Time/Statement:   Admission Orders (From admission, onward)     Ordered        04/06/22 1900  Inpatient Admission  Once                      Orders Placed This Encounter   Procedures    Inpatient Admission     Standing Status:   Standing     Number of Occurrences:   1     Order Specific Question:   Level of Care     Answer:   Med Surg [16]     Order Specific Question:   Estimated length of stay     Answer:   More than 2 Midnights     Order Specific Question:   Certification     Answer:   I certify that inpatient services are medically necessary for this patient for a duration of greater than two midnights  See H&P and MD Progress Notes for additional information about the patient's course of treatment  Chief Complaint   Patient presents with    Hypertension       Initial Presentation: 39 y o  female  [id number],  PPD # 12 s/p SVDwith h/o  GHTN presents today after having elevated BP readings at home and headache for 4 days -frontal, bilateral, radiates to occipital region,some occasional spots in vision  States Bps were 150s/100s  She has h/o migraines and has been taking benadryl and motrin at home with no relief  She was induced at 37 weeks for gestational htn- Bps were mild range around the time of delivery  She has not been taking any anti-hypertensives  She was readmitted with postpartum endometritis and treated with unasyn 3/26 - 3/28  Admitted to INPATIENT with preeclampsia with severe features by HA/vision changes  Start Mag infusion for seizure prophylaxis, monitor bp,consider antihypertensive  She was given Fioricet/reglan and benadryl here with mild relief  Date: 4/7  Day 2:  Persistent frontal headache  Reports feeling dizzy, photophobia  /98  Give Nurtec now  First dose Procardia XL 30 mg now - may need bid dosing or increase to 60 mg pending response  Continue Mag sulfate till 24hrs  & monitor bp's closely    Toradol IV X 1, Solumedrol IV x 1, Reglan IV x 1    Admission  Vitals   Temperature Pulse Respirations Blood Pressure SpO2   04/06/22 1655 04/06/22 1655 04/06/22 1655 04/06/22 1655 04/06/22 1655   99 2 °F (37 3 °C) 94 18 (!) 153/110 98 %      Temp Source Heart Rate Source Patient Position - Orthostatic VS BP Location FiO2 (%)   04/06/22 1655 04/06/22 1655 04/06/22 1935 04/06/22 1955 --   Temporal Monitor Sitting Right arm       Pain Score       04/06/22 1655       5          Wt Readings from Last 1 Encounters:   04/06/22 85 3 kg (188 lb)     Additional Vital Signs:   04/07/22 1403 97 8 °F (36 6 °C) 75 16 142/91 96 % None (Room air) -- Lying   04/07/22 1155 97 6 °F (36 4 °C) 69 16 140/97 95 % None (Room air) -- Lying   04/07/22 1000 -- 77 16 127/97 96 % None (Room air) -- Sitting   04/07/22 0834 97 7 °F (36 5 °C) 83 16 147/98 95 % None (Room air) WDL Sitting   04/07/22 0700 97 8 °F (36 6 °C) 67 16 152/86 95 % None (Room air) -- Sitting   04/07/22 0608 -- 66 16 144/81 95 % None (Room air) -- Lying   04/07/22 0356 -- 87 16 143/89 98 % -- -- Lying   04/07/22 0200 -- 71 16 139/81 95 % None (Room air) -- Lying   04/07/22 0005 97 6 °F (36 4 °C) 70 16 137/87 96 % None (Room air) -- Lying   04/06/22 2230 -- 66 16 155/99  -- -- -- Lying   04/06/22 2213 -- 72 16 148/107 Abnormal  97 % None (Room air) WDL Sitting   04/06/22 2025 -- -- -- 159/102 Abnormal  -- -- -- --   04/06/22 2012 -- -- 16 142/98 -- -- -- Sitting   04/06/22 2000 -- -- -- -- -- None (Room air) -- --   04/06/22 1955 -- -- -- 151/94  -- -- -- Sitting   04/06/22 1935 97 9 °F (36 6 °C) 66 16 165/107 Abnormal   98 % -- -- Sitting   04/06/22 1905 -- 75 -- 164/111 Abnormal  -- -- -- --   04/06/22 1852 -- 78 -- 158/109 Abnormal  -- -- -- --   04/06/22 1837 -- 72 -- 154/107 Abnormal  -- -- -- --   04/06/22 1825 -- 59 16 148/105 Abnormal  -- -- -- --   04/06/22 1810 -- 64 16 143/106 Abnormal  -- -- -- --   04/06/22 1755 -- 72 18 147/101 Abnormal  -- -- -- --   04/06/22 1741 -- 80 18 151/106 Abnormal  -- -- -- --   04/06/22 1725 -- 78 18 153/103 Abnormal  -- -- -- --   04/06/22 1710 -- 77 18 144/97 -- -- -- --       Pertinent Labs/Diagnostic Test Results:     Results from last 7 days   Lab Units 04/06/22  1726   WBC Thousand/uL 10 15   HEMOGLOBIN g/dL 11 3*   HEMATOCRIT % 35 4   PLATELETS Thousands/uL 387     Results from last 7 days   Lab Units 04/06/22  1726   SODIUM mmol/L 141   POTASSIUM mmol/L 3 9   CHLORIDE mmol/L 104   CO2 mmol/L 29   ANION GAP mmol/L 8   BUN mg/dL 20   CREATININE mg/dL 0 83   EGFR ml/min/1 73sq m 90   CALCIUM mg/dL 8 9     Results from last 7 days   Lab Units 04/06/22  1726   AST U/L 11   ALT U/L 30   ALK PHOS U/L 108   TOTAL PROTEIN g/dL 6 6   ALBUMIN g/dL 2 9*   TOTAL BILIRUBIN mg/dL 0 20     Results from last 7 days   Lab Units 04/06/22  1726   GLUCOSE RANDOM mg/dL 90       Past Medical History:   Diagnosis Date    Acid reflux     Bicuspid aortic valve     COVID-19 affecting pregnancy in second trimester 12/12/2021    Depression 2004    in 2004 after termination of pregnancy  was tx with zoloft     Diabetes mellitus ([facility] [address] )     prediabetic    Gestational hypertension 2011    third trimester with her 1st baby  del @ 38 weeks    High-risk pregnancy     HX DVT (deep venous thrombosis) (HCC)     in right axilla -- complication from thoracic outlet syndrome    PCOS (polycystic ovarian syndrome) 2010    initally diagnosed in 2010 tx with metformin   pt d/c med and then restarted in 2016    Pituitary microadenoma ([address] ) 2009    last MRI in 2009    Prediabetes 2016    'impaired fasting glucose'     Thoracic outlet syndrome 2004    [facility]; has surgical repair     Admitting Diagnosis: High blood pressure affecting pregnancy, antepartum [O16 9]  Age/Sex: 39 y o  female  Admission Orders:  Scheduled Medications:  levothyroxine, 75 mcg, Oral, Early Morning  Rimegepant Sulfate, 75 mg, Oral, Daily  4/7 Procardia XL  30 mg, po , daily    Continuous IV Infusions:  lactated ringers, 75 mL/hr, Intravenous, Continuous  magnesium sulfate, 2 g/hr, Intravenous, Continuous    PRN Meds:  acetaminophen, 650 mg, Oral, Q6H PRN x 1 4/7  calcium gluconate, 1 g, Intravenous, Once PRN  diphenhydrAMINE, 25 mg, Oral, Q6H PRN x 1 4/7  ibuprofen, 600 mg, Oral, Q6H PRN x 1 4/7  4/7 Toradol 30 mg IV x 1,   SoluMedrol 40 MG IV x 1,     Reglan 10 mg IV X 1  [facility]    Network Utilization Review Department  ATTENTION: Please call with any questions or concerns to [phone] and carefully listen to the prompts so that you are directed to the right person  All voicemails are confidential   Carolin Merritt all requests for admission clinical reviews, approved or denied determinations and any other requests to dedicated fax number below belonging to the campus where the patient is receiving treatment   List of dedicated fax numbers for the Facilities:  [address] DENIALS (Administrative/Medical Necessity) [phone]   [address] (Maternity/NICU/Pediatrics) [phone]   [address] [place] [address]  [address] [address] [place] [address] [address] [facility] [facility] [phone]

## 2022-04-07 NOTE — ASSESSMENT & PLAN NOTE
- history of migraines prior to pregnancy  She has used Nurtec ODT prior to pregnancy    - she has received multiple treatments for HA since admission with minimal response  Day of admission she received Fioricet, IV reglan and benadryl on day of admission   - today she received Motrin and Benadryl at 8:30 - she reported her HA at 8:30 at 8/10   - At 10:00 she received Nurtec OTC (not on formulary but patient had brought from home), after no real improvement after Motrin and Benadryl  Headache to 5/10   - At 14:00 today received -  Methyprednisone 40mg IV, Reglan 10mg IV, and Toradol 30mg IV (she is continuing to received Magnesium Sulfate)   - pt overall feels not much better headache still saying 5/10, but feels may be due to magneisum    - Magnesium will be stopping at 19:00 tonight  We will wait to see how headache is overnight  Analgesia prn  If still not improving t/c MRI head tomorrow  Reviewed plan w/ pt who agrees

## 2022-04-08 VITALS
HEIGHT: 65 IN | SYSTOLIC BLOOD PRESSURE: 115 MMHG | DIASTOLIC BLOOD PRESSURE: 84 MMHG | WEIGHT: 188 LBS | RESPIRATION RATE: 17 BRPM | OXYGEN SATURATION: 98 % | TEMPERATURE: 98.8 F | HEART RATE: 70 BPM | BODY MASS INDEX: 31.32 KG/M2

## 2022-04-08 PROCEDURE — 99238 HOSP IP/OBS DSCHRG MGMT 30/<: CPT | Performed by: OBSTETRICS & GYNECOLOGY

## 2022-04-08 PROCEDURE — NC001 PR NO CHARGE: Performed by: OBSTETRICS & GYNECOLOGY

## 2022-04-08 RX ORDER — NIFEDIPINE 30 MG/1
30 TABLET, EXTENDED RELEASE ORAL DAILY
Qty: 30 TABLET | Refills: 0 | Status: SHIPPED | OUTPATIENT
Start: 2022-04-09 | End: 2022-05-09

## 2022-04-08 RX ORDER — SUMATRIPTAN 50 MG/1
50 TABLET, FILM COATED ORAL ONCE AS NEEDED
Status: DISCONTINUED | OUTPATIENT
Start: 2022-04-08 | End: 2022-04-08 | Stop reason: HOSPADM

## 2022-04-08 RX ORDER — SUMATRIPTAN 50 MG/1
50 TABLET, FILM COATED ORAL ONCE AS NEEDED
Qty: 10 TABLET | Refills: 0 | Status: SHIPPED | OUTPATIENT
Start: 2022-04-08

## 2022-04-08 RX ADMIN — NIFEDIPINE 30 MG: 30 TABLET, FILM COATED, EXTENDED RELEASE ORAL at 09:06

## 2022-04-08 RX ADMIN — LEVOTHYROXINE SODIUM 75 MCG: 25 TABLET ORAL at 06:11

## 2022-04-08 NOTE — DISCHARGE SUMMARY
Discharge Summary - Gary Thomson 39 y o  female MRN: [id number]    Unit/Bed#: -01 Encounter: [id number]    Admission Date: 4/6/2022     Discharge Date: 04/08/22    Attending: Chip Meza DO    Principal Diagnosis: High blood pressure affecting pregnancy, antepartum [O16 9],  Postpartum pre-eclampsia    Secondary Diagnosis: Migraines    Complications: none apparent    Condition at discharge: good     Discharge instructions/Information to patient and family:   See after visit summary for information provided to patient and family  Provisions for Follow-Up Care:  See after visit summary for information related to follow-up care and any pertinent home health orders  Disposition: See After Visit Summary for discharge disposition information  Planned Readmission: No    Discharge Medications: For a complete list of the patient's medications, please refer to her med rec

## 2022-04-08 NOTE — PROGRESS NOTES
Progress Note - OB/GYN  Post-Partum Physician Note   Marie Thomson 39 y o  female MRN: [id number]  Unit/Bed#: -01 Encounter: [id number]    Patient is post PARTUM day 18 from an   She was readmitted with preeclampsia with severe features, based on elevated blood pressures and a headache  Subjective:  Pt states headaches resolved, no headache since yesterday  No N/V, no abdominal pain    Patient desires to be discharged to home    Pain: controlled  Tolerating Oral Intake: yes  Voiding: yes  Ambulating: yes  Breastfeeding:well  Chest Pain: no  Shortness of Breath: no  Leg Pain/Discomfort: no  Lochia: minimal      Objective:   Vitals:    22 0700   BP: 115/84   Pulse: 70   Resp: 17   Temp: 98 8 °F (37 1 °C)   SpO2: 98%       Intake/Output Summary (Last 24 hours) at 2022 1017  Last data filed at 2022 2140  Gross per 24 hour   Intake --   Output 2400 ml   Net -2400 ml       Physical Exam:  General: awake alert oriented  Cardiovascular: RRR  Lungs: clear  Abdomen: soft, nontender, fundus below umbilicus  Lower extremeties:minimal edema, no sign DVT        MEDS:   Current Facility-Administered Medications   Medication Dose Route Frequency    acetaminophen (TYLENOL) tablet 650 mg  650 mg Oral Q6H PRN    calcium gluconate 10 % injection 1 g  1 g Intravenous Once PRN    diphenhydrAMINE (BENADRYL) tablet 25 mg  25 mg Oral Q6H PRN    ibuprofen (MOTRIN) tablet 600 mg  600 mg Oral Q6H PRN    lactated ringers infusion  75 mL/hr Intravenous Continuous    levothyroxine tablet 75 mcg  75 mcg Oral Early Morning    magnesium sulfate 20 g/500 mL infusion (premix)  2 g/hr Intravenous Continuous    NIFEdipine (PROCARDIA XL) 24 hr tablet 30 mg  30 mg Oral Daily    Rimegepant Sulfate (NURTEC) disintegrating tablet TBDP 75 mg  75 mg Oral Daily PRN       Invasive Devices  Report    Peripheral Intravenous Line            Peripheral IV 22 Left Antecubital 1 day                  Assessment and Plan:  39y o  year-old , postpartum day 25 with readmission for severe preeclampsia with headache, complicated by h/o migraines       Hypertension in pregnancy, preeclampsia, severe, delivered/postpartum  - pt had 2 severe range BP on day of admission but BP decrease spontaneously without IV medications  - admission labs normal  - started on magnesium sulfate for seizure prophylaxis at 19:00 on 2022, discontinued yesterday at 19:00   - Blood pressures all in normal range since starting Procardia XL 30mg  - Repeat labs normal yesterday  - HA  resolved after Imitrex   -  Plan to discharge to home with Imitrex 50 mg and Procardia 30 mg P O  daily    Migraine  - history of migraines prior to pregnancy  She has used Nurtec ODT prior to pregnancy     - she has received multiple treatments for HA since admission with minimal response  Day of admission she received Fioricet, IV reglan and benadryl on day of admission   - today she received Motrin and Benadryl at 8:30 - she reported her HA at 8:30 at 8/10   - At 10:00 she received Nurtec OTC (not on formulary but patient had brought from home), after no real improvement after Motrin and Benadryl    Headache to 5/10   - At 14:00 today received -  Methyprednisone 40mg IV, Reglan 10mg IV, and Toradol 30mg IV (she is continuing to received Magnesium Sulfate)   - pt overall feels not much better headache resolved since yesterday night   -  Plan to discharge to home today  Patient to f/u with Ob/Gyn in 1 week as outpatient for Blood pressure check    Virgil Sena DO

## 2022-04-08 NOTE — UTILIZATION REVIEW
Inpatient Admission Authorization Request   NOTIFICATION OF INPATIENT ADMISSION/INPATIENT AUTHORIZATION REQUEST   SERVICING FACILITY:   [place] [address]  Tax ID: [id number]  NPI: [id number]  Place of Service: Inpatient [address] [facility]  60  Place of Service Code: 24     ATTENDING PROVIDER:  Attending Name and NPI#: Frank Gibbons [[id number]]  Address: [address] [place] [address]  Phone: [phone]     UTILIZATION REVIEW CONTACT:  Aldo Hutchins Utilization   Network Utilization Review Department  Phone: [phone]  Fax [phone]  Email: [email or web address]   PHYSICIAN ADVISORY SERVICES:  FOR QVDM-BE-KSEA REVIEW - MEDICAL NECESSITY DENIAL  Phone: [phone]  Fax: [phone]  Email: [email or web address]     TYPE OF REQUEST:  Inpatient Status     ADMISSION INFORMATION:  ADMISSION DATE/TIME: 4/6/22  7:00 PM  PATIENT DIAGNOSIS CODE/DESCRIPTION:  High blood pressure affecting pregnancy, antepartum [O16 9]  DISCHARGE DATE/TIME: No discharge date for patient encounter  IMPORTANT INFORMATION:  Please contact the Kesha Bragg directly with any questions or concerns regarding this request  Department voicemails are confidential     Send requests for admission clinical reviews, concurrent reviews, approvals, and administrative denials due to lack of clinical to fax [phone]

## 2022-04-08 NOTE — PLAN OF CARE
Problem: PAIN - ADULT  Goal: Verbalizes/displays adequate comfort level or baseline comfort level  Description: Interventions:  - Encourage patient to monitor pain and request assistance  - Assess pain using appropriate pain scale  - Administer analgesics based on type and severity of pain and evaluate response  - Implement non-pharmacological measures as appropriate and evaluate response  - Consider cultural and social influences on pain and pain management  - Notify physician/advanced practitioner if interventions unsuccessful or patient reports new pain  Outcome: Completed     Problem: Knowledge Deficit  Goal: Patient/family/caregiver demonstrates understanding of disease process, treatment plan, medications, and discharge instructions  Description: Complete learning assessment and assess knowledge base    Interventions:  - Provide teaching at level of understanding  - Provide teaching via preferred learning methods  Outcome: Completed     Problem: DISCHARGE PLANNING  Goal: Discharge to home or other facility with appropriate resources  Description: INTERVENTIONS:  - Identify barriers to discharge w/patient and caregiver  - Arrange for needed discharge resources and transportation as appropriate  - Identify discharge learning needs (meds, wound care, etc )  - Arrange for interpretive services to assist at discharge as needed  - Refer to Case Management Department for coordinating discharge planning if the patient needs post-hospital services based on physician/advanced practitioner order or complex needs related to functional status, cognitive ability, or social support system  Outcome: Completed

## 2022-04-14 NOTE — UTILIZATION REVIEW
Notification of Discharge   This is a Notification of Discharge from our facility [address]  Please be advised that this patient has been discharge from our facility  Below you will find the admission and discharge date and time including the patients disposition  UTILIZATION REVIEW CONTACT:  Paty Sigala  Utilization   Network Utilization Review Department  Phone: [phone] x carefully listen to the prompts  All voicemails are confidential   Email: [email or web address]     PHYSICIAN ADVISORY SERVICES:  FOR LWUO-TD-ESXZ REVIEW - MEDICAL NECESSITY DENIAL  Phone: [phone]  Fax: [phone]  Email: [email or web address]     PRESENTATION DATE: 4/6/2022  4:43 PM  OBERVATION ADMISSION DATE:   INPATIENT ADMISSION DATE: 4/6/22  7:00 PM   DISCHARGE DATE: 4/8/2022 12:00 PM  DISPOSITION: Home/Self Care Home/Self Care      IMPORTANT INFORMATION:  Send all requests for admission clinical reviews, approved or denied determinations and any other requests to dedicated fax number below belonging to the campus where the patient is receiving treatment   List of dedicated fax numbers:  [address] DENIALS (Administrative/Medical Necessity) [phone]   [address] (Maternity/NICU/Pediatrics) [phone]   Seanganesh Barnhart [phone]   [facility] [phone]   [address] [place] [address] [phone]   [address] [place] [address],[address] [phone]   [facility]  [phone]   [address] [place] [address], [address] 1000 [facility] [phone]

## 2022-05-04 ENCOUNTER — POSTPARTUM VISIT (OUTPATIENT)
Dept: OBGYN CLINIC | Facility: CLINIC | Age: 37
End: 2022-05-04

## 2022-05-04 VITALS
HEIGHT: 64 IN | WEIGHT: 188 LBS | BODY MASS INDEX: 32.1 KG/M2 | SYSTOLIC BLOOD PRESSURE: 120 MMHG | DIASTOLIC BLOOD PRESSURE: 80 MMHG

## 2022-05-04 PROCEDURE — 99024 POSTOP FOLLOW-UP VISIT: CPT | Performed by: OBSTETRICS & GYNECOLOGY

## 2022-05-12 ENCOUNTER — TELEPHONE (OUTPATIENT)
Dept: OBGYN CLINIC | Facility: CLINIC | Age: 37
End: 2022-05-12

## 2022-05-12 PROBLEM — O13.9 GESTATIONAL HYPERTENSION: Status: RESOLVED | Noted: 2022-03-15 | Resolved: 2022-05-12

## 2022-05-12 PROBLEM — R73.09 ELEVATED GLUCOSE TOLERANCE TEST: Status: RESOLVED | Noted: 2022-02-11 | Resolved: 2022-05-12

## 2022-05-12 PROBLEM — U07.1 COVID-19 AFFECTING PREGNANCY IN THIRD TRIMESTER: Status: RESOLVED | Noted: 2022-01-20 | Resolved: 2022-05-12

## 2022-05-12 PROBLEM — O13.3 GESTATIONAL HYPERTENSION, THIRD TRIMESTER: Status: RESOLVED | Noted: 2022-01-27 | Resolved: 2022-05-12

## 2022-05-12 PROBLEM — O98.513 COVID-19 AFFECTING PREGNANCY IN THIRD TRIMESTER: Status: RESOLVED | Noted: 2022-01-20 | Resolved: 2022-05-12

## 2022-05-12 RX ORDER — ACETAMINOPHEN AND CODEINE PHOSPHATE 120; 12 MG/5ML; MG/5ML
1 SOLUTION ORAL DAILY
Qty: 28 TABLET | Refills: 3 | Status: SHIPPED | OUTPATIENT
Start: 2022-05-12 | End: 2022-08-09 | Stop reason: SDUPTHER

## 2022-05-12 NOTE — PROGRESS NOTES
[address], [facility], [address] [facility]    Assessment/Plan:  Partha Coley is a 39y o  year old [id number] who presents for postpartum visit  Routine Postpartum Care  1  Normal postpartum exam  2  Contraception: oral progesterone-only contraceptive  3  Depression Screen: negative  4  Feeding: breast  5  Psychosocial support: good  6  Patient Education: "[employer]: Uli Delaney  7  Follow up in: 3 months or as needed  Additional Problems:  1  Routine postpartum follow-up          Subjective:     CC: Postpartum visit    Evangelista Mix is a 39 y o  y o  female [id number] who presents for a postpartum visit  She is 6 weeks postpartum following a spontaneous vaginal delivery on 3/21/22 at 37 0 weeks  Outcome: spontaneous vaginal delivery  Anesthesia: epidural  Postpartum course has been complicated by FUO and PP preeclamapsia  Baby's course has been unremarkable  Baby is feeding by breast      Bleeding no bleeding  Bowel function is normal  Bladder function is normal  Patient is not sexually active  Contraception method is oral progesterone-only contraceptive  Postpartum depression screening: negative  The following portions of the patient's history were reviewed and updated as appropriate: allergies, current medications, past family history, past medical history, obstetric history, gynecologic history, past social history, past surgical history and problem list       Objective:  /80 (BP Location: Left arm, Patient Position: Sitting, Cuff Size: Standard)   Ht 5' 4" (1 626 m)   Wt 85 3 kg (188 lb)   LMP 2021 (Exact Date)   BMI 32 27 kg/m²   Pregravid Weight/BMI: Pregravid weight not on file (BMI Could not be calculated)  Current Weight: 85 3 kg (188 lb)   Total Weight Gain: Not found     Pre- Vitals    Flowsheet Row Most Recent Value   Prenatal Assessment    Prenatal Vitals    Blood Pressure 120/80   Weight - Scale 85 3 kg (188 lb) Urine Albumin/Glucose    Dilation/Effacement/Station    Vaginal Drainage    Edema            General: Well appearing, no distress  Mood and affect: Appropriate  Abdomen: Soft, nontender  Thyroid: No masses  Incision: n/a  Vulva: Well healed  Vagina: Well healed  No lesions  Urethra: Normal  Cervix: Healed, no lesions  Uterus: Normal size, no masses  Adnexa: No pain or masses  Extremities: Warm and well perfused  Non tender

## 2022-05-12 NOTE — TELEPHONE ENCOUNTER
Pt was seen for Post partum visit on 5/4/22 and her pharmacy has not received her prescription  for her birth control  Please send to [facility]

## 2022-06-25 NOTE — CONSULTS
Consultation - Infectious Disease   Marie Thomson 39 y o  female MRN: [id number]  Unit/Bed#: -01 Encounter: [id number]      Assessment/Plan   1  Post-partum endometritis/Fever/Leukocytosis: Pt presented with fever, chills, lower abd pain, myalgias, and fatigue following recent  on 3/21  She had WBC count of 14 6K on admission  Pt was started on Unasyn early AM on 3/26 for tx of post-partum endometritis  She developed fever to 103 1 on 3/26  Pelvic US findings were suggestive of endometritis  Abd CT did not show any acute intra-abd findings  UA showed pyuria  Urine cx was neg but the specimen was sent after the Pt received first dose of Unasyn which could affect the cx results  Pt did not have any urinary sx's  Bld cx's are neg so far    She conts to have intermittent fever but WBC count has decreased to 10 2K  Overall, she is starting to feel better  She reports left-sided H/A which is typical of her usual migraine H/A  COVID19 and Influenza tests are neg  I reviewed admission CXR which did not show any pulmonary infilts  A  Cont Unasyn since WBC count has decreased to nml - not surprising that Pt is still having fevers from post-partum endometritis since she has only received 36 hrs of abx tx  B  Awaiting final bld cx results  C  Will monitor for the development of toxicity to current abx tx      Discussed case with Dr Yolette Hills        History of Present Illness   Physician Requesting Consult: Yolette Hills MD  Reason for Consult / Principal Problem: Fever, lower abd pain, myalgias, and fatigue    HPI: Rachel Looney is a 39y o  year old female with recent spont vag delivery on 3/21, bicuspid AV, depression, DVT, DM, HTN, PCOS, and migraine H/A who was admitted on 3/25 with c/o fever, chills, lower abd pain, body aches, light vaginal bleeding, and fatigue x 1 day  On admission, she was initially afebrile but WBC count was 14 6K   She was started on Unasyn early AM on 3/26 for tx of post-partum endometritis  I reviewed admission CXR which did not show any pulmonary infilts  Pelvic U/S showed endometrial blood products vs products of conception  She developed fever to 103 1 with shaking chills on 3/26 but WBC count decreased to 11 7K  Bld cx's are neg so far  UA showed 30-50 WBC's  Urine cx is neg but specimen was sent after Unasyn was given which could affect the cx results  Pt c/o RLQ abd pain  Abd CT did not show any acute intra-abd findings  She also c/o left-sided H/A that is c/w with her usual migraine H/A  She is still having intermittent fever but WBC count is down to 10 2K today  She is feeling better overall    She denies cough, SOB, CP, N/V/D, or dysuria      Inpatient consult to Infectious Diseases  Consult performed by: Luis Mcdonald MD  Consult ordered by: Marta Fay MD          ROS: 12 systems reviewed, remainder is neg  Historical Information   Past Medical History:   Diagnosis Date    Acid reflux     Bicuspid aortic valve     COVID-19 affecting pregnancy in second trimester 12/12/2021    Depression 2004    in 2004 after termination of pregnancy  was tx with zoloft     Diabetes mellitus ([address] )     prediabetic    Gestational hypertension 2011    third trimester with her 1st baby  del @ 38 weeks    High-risk pregnancy     HX DVT (deep venous thrombosis) (HCC)     in right axilla -- complication from thoracic outlet syndrome    PCOS (polycystic ovarian syndrome) 2010    initally diagnosed in 2010 tx with metformin   pt d/c med and then restarted in 2016    Pituitary microadenoma ([address] ) 2009    last MRI in 2009    Prediabetes 2016    'impaired fasting glucose'     Thoracic outlet syndrome 2004    in 2004 per [facility] records; has surgical repair     Past Surgical History:   Procedure Laterality Date    BONE RESECTION, RIB Right 2004     Social History   Social History     Substance and Sexual Activity   Alcohol Use Never     Social History Substance and Sexual Activity   Drug Use Never     Social History     Tobacco Use   Smoking Status Never Smoker   Smokeless Tobacco Never Used     History reviewed  No pertinent family history      Meds/Allergies   MEDS:  Unasyn: #2      Current Facility-Administered Medications:     acetaminophen (TYLENOL) tablet 975 mg, 975 mg, Oral, Q8H PRN, Katt Jacob MD, 975 mg at 03/26/22 2108    ampicillin-sulbactam (UNASYN) in sodium chloride 0 9% 3 g, 3 g, Intravenous, Q6H, Carole Beckwith MD, 3 g at 03/27/22 1805    butalbital-acetaminophen-caffeine (FIORICET,ESGIC) -40 mg per tablet 2 tablet, 2 tablet, Oral, Q8H PRN, Katt Jacob MD, 2 tablet at 03/27/22 1804    diphenhydrAMINE (BENADRYL) tablet 50 mg, 50 mg, Oral, Q8H PRN, Katt Jacob MD, 50 mg at 03/27/22 1908    enoxaparin (LOVENOX) subcutaneous injection 40 mg, 40 mg, Subcutaneous, Q12H [address], Katt Jacob MD, 40 mg at 03/27/22 4334    ibuprofen (MOTRIN) tablet 600 mg, 600 mg, Oral, Q6H PRN, Carole Beckwith MD, 600 mg at 03/27/22 1148    lactated ringers infusion, 125 mL/hr, Intravenous, Continuous, Carole Beckwith MD, Stopped at 03/27/22 1615    [START ON 3/28/2022] levothyroxine tablet 75 mcg, 75 mcg, Oral, Daily, Katt Jacob MD    pantoprazole (PROTONIX) EC tablet 40 mg, 40 mg, Oral, Early Morning, Katt Jacob MD, 40 mg at 03/27/22 1417    Allergies   Allergen Reactions    Gadobutrol Cough and Itching    Iv Contrast [Iodinated Diagnostic Agents] Shortness Of Breath and Throat Swelling     Pt stated this was from 'years and years' ago and has not had it since    Nuts - Food Allergy Anaphylaxis    Shellfish-Derived Products - Food Allergy Anaphylaxis    Shrimp Extract Allergy Skin Test - Food Allergy Headache    Venlafaxine Anaphylaxis     Per [place] records    Hydromorphone Itching     During labor had reaction    Morphine Itching    Peanut Oil - Food Allergy Other (See Comments)     Itching, trouble breathing Intake/Output Summary (Last 24 hours) at 3/27/2022 1945  Last data filed at 3/27/2022 1615  Gross per 24 hour   Intake 2045 83 ml   Output --   Net 2045 83 ml       PE:  WD, WN, WF who is starting to feel better  VSS, Tmax: 103 1  HEENT: No scleral icterus, pharynx clear  NECK: Supple  CARDIAC:  RRR, nml S1, S2  LUNGS:  +BS, soft, nontender  ABDOMEN:  +BS, soft, +mild discomfort with palpation of lower abd  MUSCULOSKELETAL: No calf tenderness  EXTREMITIES:  No LE edema  SKIN: No rash  NEURO: Grossly nonfocal    Invasive Devices:   Peripheral IV 03/26/22 Right;Ventral (anterior) Forearm (Active)   Site Assessment WDL; Clean;Dry; Intact 03/27/22 0940   Dressing Type Transparent 03/27/22 0940   Line Status Infusing 03/27/22 0940   Dressing Status Clean;Dry; Intact 03/27/22 0940           Lab Results:   Admission on 03/25/2022   Component Date Value    WBC 03/25/2022 14 67*    RBC 03/25/2022 3 53*    Hemoglobin 03/25/2022 10 6*    Hematocrit 03/25/2022 31 8*    MCV 03/25/2022 90     MCH 03/25/2022 30 0     MCHC 03/25/2022 33 3     RDW 03/25/2022 12 9     MPV 03/25/2022 10 5     Platelets 12/09/5020 227     nRBC 03/25/2022 0     Neutrophils Relative 03/25/2022 91*    Immat GRANS % 03/25/2022 1     Lymphocytes Relative 03/25/2022 4*    Monocytes Relative 03/25/2022 4     Eosinophils Relative 03/25/2022 0     Basophils Relative 03/25/2022 0     Neutrophils Absolute 03/25/2022 13 34*    Immature Grans Absolute 03/25/2022 0 08     Lymphocytes Absolute 03/25/2022 0 61     Monocytes Absolute 03/25/2022 0 57     Eosinophils Absolute 03/25/2022 0 04     Basophils Absolute 03/25/2022 0 03     Sodium 03/25/2022 138     Potassium 03/25/2022 3 5     Chloride 03/25/2022 103     CO2 03/25/2022 22     ANION GAP 03/25/2022 13     BUN 03/25/2022 9     Creatinine 03/25/2022 0 70     Glucose 03/25/2022 112     Calcium 03/25/2022 8 2*    Corrected Calcium 03/25/2022 9 5     AST 03/25/2022 21     ALT 03/25/2022 27     Alkaline Phosphatase 03/25/2022 106     Total Protein 03/25/2022 6 3*    Albumin 03/25/2022 2 4*    Total Bilirubin 03/25/2022 0 40     eGFR 03/25/2022 111     LACTIC ACID 03/25/2022 1 1     Procalcitonin 03/25/2022 0 22     Protime 03/25/2022 13 5     INR 03/25/2022 1 04     PTT 03/25/2022 30     Blood Culture 03/25/2022 No Growth at 24 hrs   Blood Culture 03/25/2022 No Growth at 24 hrs       Color, UA 03/26/2022 Red     Clarity, UA 03/26/2022 Cloudy     Specific Gravity, UA 03/26/2022 <=1 005     pH, UA 03/26/2022 6 5     Leukocytes, UA 03/26/2022 Moderate*    Nitrite, UA 03/26/2022 Negative     Protein, UA 03/26/2022 30 (1+)*    Glucose, UA 03/26/2022 Negative     Ketones, UA 03/26/2022 Negative     Urobilinogen, UA 03/26/2022 0 2     Bilirubin, UA 03/26/2022 Negative     Blood, UA 03/26/2022 Large*    SARS-CoV-2 03/26/2022 Negative     INFLUENZA A PCR 03/26/2022 Negative     INFLUENZA B PCR 03/26/2022 Negative     RSV PCR 03/26/2022 Negative     WBC 03/26/2022 11 76*    RBC 03/26/2022 3 44*    Hemoglobin 03/26/2022 10 2*    Hematocrit 03/26/2022 29 9*    MCV 03/26/2022 87     MCH 03/26/2022 29 7     MCHC 03/26/2022 34 1     RDW 03/26/2022 12 6     MPV 03/26/2022 10 5     Platelets 21/30/2405 209     nRBC 03/26/2022 0     Neutrophils Relative 03/26/2022 90*    Immat GRANS % 03/26/2022 1     Lymphocytes Relative 03/26/2022 4*    Monocytes Relative 03/26/2022 5     Eosinophils Relative 03/26/2022 0     Basophils Relative 03/26/2022 0     Neutrophils Absolute 03/26/2022 10 56*    Immature Grans Absolute 03/26/2022 0 09     Lymphocytes Absolute 03/26/2022 0 50*    Monocytes Absolute 03/26/2022 0 57     Eosinophils Absolute 03/26/2022 0 01     Basophils Absolute 03/26/2022 0 03     RBC, UA 03/26/2022 Innumerable*    WBC, UA 03/26/2022 30-50*    Epithelial Cells 03/26/2022 Occasional     Bacteria, UA 03/26/2022 Occasional     Urine Culture 03/26/2022 No Growth <1000 cfu/mL     Ventricular Rate 03/25/2022 104     Atrial Rate 03/25/2022 104     DC Interval 03/25/2022 118     QRSD Interval 03/25/2022 80     QT Interval 03/25/2022 334     QTC Interval 03/25/2022 439     P Axis 03/25/2022 50     QRS Axis 03/25/2022 31     T Wave Axis 03/25/2022 9     WBC 03/27/2022 10 25*    RBC 03/27/2022 3 46*    Hemoglobin 03/27/2022 10 4*    Hematocrit 03/27/2022 30 5*    MCV 03/27/2022 88     MCH 03/27/2022 30 1     MCHC 03/27/2022 34 1     RDW 03/27/2022 13 0     MPV 03/27/2022 10 7     Platelets 31/12/4813 229     nRBC 03/27/2022 0     Neutrophils Relative 03/27/2022 84*    Immat GRANS % 03/27/2022 1     Lymphocytes Relative 03/27/2022 10*    Monocytes Relative 03/27/2022 5     Eosinophils Relative 03/27/2022 0     Basophils Relative 03/27/2022 0     Neutrophils Absolute 03/27/2022 8 61*    Immature Grans Absolute 03/27/2022 0 09     Lymphocytes Absolute 03/27/2022 0 98     Monocytes Absolute 03/27/2022 0 51     Eosinophils Absolute 03/27/2022 0 03     Basophils Absolute 03/27/2022 0 03     Sodium 03/27/2022 140     Potassium 03/27/2022 3 6     Chloride 03/27/2022 106     CO2 03/27/2022 26     ANION GAP 03/27/2022 8     BUN 03/27/2022 5     Creatinine 03/27/2022 0 64     Glucose 03/27/2022 83     Calcium 03/27/2022 8 1*    Corrected Calcium 03/27/2022 9 7     AST 03/27/2022 20     ALT 03/27/2022 28     Alkaline Phosphatase 03/27/2022 100     Total Protein 03/27/2022 6 1*    Albumin 03/27/2022 2 0*    Total Bilirubin 03/27/2022 0 30     eGFR 03/27/2022 114      Imaging Studies: I have personally reviewed pertinent reports  EKG, Pathology, and Other Studies: I have personally reviewed pertinent reports        Culture  Lab Results   Component Value Date    BLOODCX No Growth at 24 hrs  03/25/2022    BLOODCX No Growth at 24 hrs  03/25/2022     No results found for: [facility]   Lab Results   Component Value Date    [facility] No Growth <1000 cfu/mL 03/26/2022     No results found for: SPUTUMCULTUR    Principal Problem:    Postpartum endometritis  Active Problems:    History of DVT (deep vein thrombosis)    Gestational hypertension, third trimester    Migraine 38 y/o female with PMHx of gonorrhea in  presents to ED c/o pregnancy problem. Patient [id number] @ 12 weeks (desired pregnancy) reports she tested positive for Syphilis on blood work done by OBGYN. Patient endorsing vaginal discharge. Patient is here in ED for treatment. 38 y/o female with PMHx of gonorrhea in  presents to ED c/o pregnancy problem. Patient [id number] @ 12 weeks (desired pregnancy) reports she tested positive for Syphilis on blood work done by OBGYN. Patient is here in ED for treatment. unknown exposure. no rashes. no prior dx of syphilis

## 2022-07-12 ENCOUNTER — TELEPHONE (OUTPATIENT)
Dept: OBGYN CLINIC | Facility: CLINIC | Age: 37
End: 2022-07-12

## 2022-07-12 NOTE — TELEPHONE ENCOUNTER
Spoke with Jackelyn Mcfadden let her know what Dr Vania Singh said   Advised her there is nothing she can do at this point since it is hormone related

## 2022-07-12 NOTE — TELEPHONE ENCOUNTER
Bandar Saldivar called with concerns of post partum hair loss  Asking for recommendations  She states she is losing clumps of hair everytime she showers  Advised not uncommon after pregnancy due to hormone fluctuation  Will forward to Dr Austin Sanz to review if dermatology eval is indicated    Dr Austin Sanz, please advise

## 2022-08-09 ENCOUNTER — ANNUAL EXAM (OUTPATIENT)
Dept: OBGYN CLINIC | Facility: CLINIC | Age: 37
End: 2022-08-09
Payer: COMMERCIAL

## 2022-08-09 VITALS
BODY MASS INDEX: 34.15 KG/M2 | DIASTOLIC BLOOD PRESSURE: 80 MMHG | WEIGHT: 200 LBS | SYSTOLIC BLOOD PRESSURE: 130 MMHG | HEIGHT: 64 IN

## 2022-08-09 DIAGNOSIS — Z01.419 ROUTINE GYNECOLOGICAL EXAMINATION: Primary | ICD-10-CM

## 2022-08-09 DIAGNOSIS — Z12.4 SCREENING FOR MALIGNANT NEOPLASM OF THE CERVIX: ICD-10-CM

## 2022-08-09 PROCEDURE — S0612 ANNUAL GYNECOLOGICAL EXAMINA: HCPCS | Performed by: OBSTETRICS & GYNECOLOGY

## 2022-08-09 RX ORDER — ACETAMINOPHEN AND CODEINE PHOSPHATE 120; 12 MG/5ML; MG/5ML
1 SOLUTION ORAL DAILY
Qty: 28 TABLET | Refills: 12 | Status: SHIPPED | OUTPATIENT
Start: 2022-08-09

## 2022-08-09 NOTE — PROGRESS NOTES
[address], [address], [facility], [address] [facility]    ASSESSMENT/PLAN: Jazlyn Nagy is a 44 y o  [id number] who presents for annual gynecologic exam  Will call when weaning the baby and has used Minastrin in the past with good results  Encounter for routine gynecologic examination  - Routine well woman exam completed today  - Cervical Cancer Screening: Current ASCCP Guidelines reviewed  Last Pap: Not on file   Next Pap Due: today  - HPV Vaccination status: Immunization series complete  - Contraceptive counseling discussed  Current contraception: progesterone only pills until vasectomy  - Breast Cancer Screening: Last Mammogram Not on file,   - Colorectal cancer screening was not ordered  - The following were reviewed in today's visit: breast self exam, mammography screening ordered and family planning choices    Additional problems addressed during this visit:  There are no diagnoses linked to this encounter  CC:  Annual Gynecologic Examination    HPI: Jazlyn Nagy is a 44 y o  [id number] who presents for annual gynecologic examination  HPI    The following portions of the patient's history were reviewed and updated as appropriate: She  has a past medical history of Acid reflux, Bicuspid aortic valve, COVID-19 affecting pregnancy in second trimester (12/12/2021), COVID-19 affecting pregnancy in third trimester (1/20/2022), Depression (2004), Diabetes mellitus ([facility] [address] ), Gestational hypertension (2011), Gestational hypertension, third trimester (1/27/2022), High-risk pregnancy, HX DVT (deep venous thrombosis) ([facility] [address] ), Hypertension in pregnancy, preeclampsia, severe, delivered/postpartum (4/6/2022), PCOS (polycystic ovarian syndrome) (2010), Pituitary microadenoma ([address] ) (2009), Prediabetes (2016), and Thoracic outlet syndrome (2004)  She  has a past surgical history that includes Bone Resection, Rib (Right, 2004) and [place] tooth extraction (Bilateral)    Her family history is not on file  She  reports that she has never smoked  She has never used smokeless tobacco  She reports that she does not drink alcohol and does not use drugs  Current Outpatient Medications   Medication Sig Dispense Refill    acetaminophen (TYLENOL) 325 mg tablet Take 2 tablets (650 mg total) by mouth every 4 (four) hours as needed for mild pain  0    COLLAGEN-VITAMIN C-BIOTIN PO Take by mouth      EPINEPHrine (EPIPEN) 0 3 mg/0 3 mL SOAJ Inject 0 3 mg into a muscle every 8 (eight) hours as needed for anaphylaxis      norethindrone (Ortho Micronor) 0 35 MG tablet Take 1 tablet (0 35 mg total) by mouth in the morning  28 tablet 3    omeprazole (PriLOSEC) 20 mg delayed release capsule Take 20 mg by mouth      Prenatal Vit-Fe Fumarate-FA (PRENATAL 1+1 PO) Take 1 tablet by mouth daily      SUMAtriptan (IMITREX) 50 mg tablet Take 1 tablet (50 mg total) by mouth once as needed for migraine for up to 1 dose 10 tablet 0    witch hazel-glycerin (TUCKS) topical pad Apply 1 pad topically every 4 (four) hours as needed for irritation  0    ibuprofen (MOTRIN) 600 mg tablet Take 1 tablet (600 mg total) by mouth every 6 (six) hours as needed for mild pain 30 tablet 0    levothyroxine (Synthroid) 75 mcg tablet Take 1 tablet (75 mcg total) by mouth daily 30 tablet 3    NIFEdipine (PROCARDIA XL) 30 mg 24 hr tablet Take 1 tablet (30 mg total) by mouth daily 30 tablet 0     No current facility-administered medications for this visit  She is allergic to gadobutrol, iv contrast [iodinated diagnostic agents], nuts - food allergy, shellfish-derived products - food allergy, shrimp extract allergy skin test - food allergy, venlafaxine, hydromorphone, morphine, and peanut oil - food allergy       Review of Systems      Objective:  /80 (BP Location: Left arm, Patient Position: Sitting, Cuff Size: Standard)   Ht 5' 4" (1 626 m)   Wt 90 7 kg (200 lb)   Breastfeeding Yes   BMI 34 33 kg/m²    Physical Exam PE:  General Appearance: alert and oriented, in no acute distress  HEENT: PERRL, thyroid without masses or tenderness  Breast: No masses, tenderness, skin changes, nipple D/C or axillary or supraclavicular adenopathy  Abdomen: Soft, non-tender, non-distended, no masses, no rebound or guarding  Pelvic:       External genitalia: Normal appearance, no abnormal pigmentation, no lesions or masses  Normal Bartholin's and [place]'s  Urinary system: Urethral meatus normal, bladder non-tender  Vaginal: normal mucosa without prolapse or lesions  Normal-appearing physiologic discharge      Cervix: Normal-appearing, well-epithelialized, no gross lesions or masses No cervical motion tenderness  Adnexa: No adnexal masses or tenderness noted  Uterus: Normal-sized, regular contour, midline, mobile, no uterine tenderness  Extremities: Normal range of motion     Skin: normal, no rash or abnormalities  Neurologic: alert, oriented x3  Psychiatric: Appropriate affect, mood stable, cooperative with exam

## 2022-08-17 LAB
CYTOLOGIST CVX/VAG CYTO: ABNORMAL
DX ICD CODE: ABNORMAL
DX ICD CODE: ABNORMAL
HPV I/H RISK 4 DNA CVX QL PROBE+SIG AMP: POSITIVE
OTHER STN SPEC: ABNORMAL
PATH REPORT.FINAL DX SPEC: ABNORMAL
PATHOLOGIST CVX/VAG CYTO: ABNORMAL
SL AMB NOTE:: ABNORMAL
SL AMB SPECIMEN ADEQUACY: ABNORMAL
SL AMB TEST METHODOLOGY: ABNORMAL

## 2022-08-23 ENCOUNTER — TELEPHONE (OUTPATIENT)
Dept: OBGYN CLINIC | Facility: CLINIC | Age: 37
End: 2022-08-23

## 2022-08-23 NOTE — TELEPHONE ENCOUNTER
Patrick Pyle called to obtain interpretation and plan for abnormal pap result  She previously had pap with prior OB done at [facility]  +HPV E/E7 on 9/8/2021- can be found on page 30 in outside records-media section  Also reports having colp in 2006 for LSIL with HPV diagnosis  Pap in 2017 was negative  Dr My Avitia, please review and advise

## 2022-09-13 ENCOUNTER — PROCEDURE VISIT (OUTPATIENT)
Dept: OBGYN CLINIC | Facility: CLINIC | Age: 37
End: 2022-09-13

## 2022-09-13 VITALS
DIASTOLIC BLOOD PRESSURE: 80 MMHG | BODY MASS INDEX: 32.5 KG/M2 | SYSTOLIC BLOOD PRESSURE: 122 MMHG | WEIGHT: 202.2 LBS | HEIGHT: 66 IN

## 2022-09-13 DIAGNOSIS — Z01.812 PRE-PROCEDURE LAB EXAM: ICD-10-CM

## 2022-09-13 DIAGNOSIS — R87.810 ASCUS WITH POSITIVE HIGH RISK HPV CERVICAL: Primary | ICD-10-CM

## 2022-09-13 DIAGNOSIS — R87.610 ASCUS WITH POSITIVE HIGH RISK HPV CERVICAL: Primary | ICD-10-CM

## 2022-09-13 LAB — SL AMB POCT URINE HCG: NEGATIVE

## 2022-09-13 NOTE — PROGRESS NOTES
Colposcopy     Date/Time 9/13/2022 4:10 PM     Universal Protocol   Consent: Verbal consent obtained  Risks and benefits: risks, benefits and alternatives were discussed  Consent given by: patient  Time out: Immediately prior to procedure a "time out" was called to verify the correct patient, procedure, equipment, support staff and site/side marked as required  Patient understanding: patient states understanding of the procedure being performed  Patient identity confirmed: verbally with patient       Performed by  Ariana Elias MD     Authorized by Ariana Elias MD        Pre-procedure details     Pre-procedure timeout performed: yes       Indication    ASC-US (HR HPV)     Procedure Details   Procedure: Colposcopy w/ endocervical curettage      Brandon speculum was placed in the vagina: yes      Under colposcopic examination the transition zone was seen in entirety: yes      Biopsy(s): yes      Location:  [facility] only    Specimen to pathology: yes       Post-procedure      Patient tolerance of procedure: Tolerated well, no immediate complications     Comments       No lesions noted  SCJ fully visualized

## 2022-09-16 LAB — STONE ANALYSIS-IMP: NORMAL

## 2022-09-17 LAB
PATH REPORT.SITE OF ORIGIN SPEC: NORMAL
PAYMENT PROCEDURE: NORMAL
SL AMB .: NORMAL

## 2023-05-24 ENCOUNTER — HOSPITAL ENCOUNTER (INPATIENT)
Facility: HOSPITAL | Age: 38
LOS: 2 days | Discharge: HOME | End: 2023-05-26
Attending: EMERGENCY MEDICINE | Admitting: INTERNAL MEDICINE

## 2023-05-24 ENCOUNTER — APPOINTMENT (EMERGENCY)
Dept: CT IMAGING | Facility: HOSPITAL | Age: 38
End: 2023-05-24

## 2023-05-24 ENCOUNTER — APPOINTMENT (OUTPATIENT)
Dept: RADIOLOGY | Facility: HOSPITAL | Age: 38
End: 2023-05-24

## 2023-05-24 DIAGNOSIS — R47.89 WORD FINDING DIFFICULTY: Primary | ICD-10-CM

## 2023-05-24 DIAGNOSIS — I10 HYPERTENSION: ICD-10-CM

## 2023-05-24 DIAGNOSIS — R94.31 ABNORMAL ECG: ICD-10-CM

## 2023-05-24 DIAGNOSIS — R94.31 NONSPECIFIC ABNORMAL ELECTROCARDIOGRAM (ECG) (EKG): ICD-10-CM

## 2023-05-24 DIAGNOSIS — R07.9 CHEST PAIN: ICD-10-CM

## 2023-05-24 PROBLEM — R29.90 STROKE-LIKE SYMPTOMS: Status: ACTIVE | Noted: 2023-05-24

## 2023-05-24 LAB
2HR DELTA HS TROPONIN: 0 NG/L
4HR DELTA HS TROPONIN: 0 NG/L
ALBUMIN SERPL BCP-MCNC: 4.7 G/DL (ref 3.5–5)
ALP SERPL-CCNC: 103 U/L (ref 34–104)
ALT SERPL W P-5'-P-CCNC: 36 U/L (ref 7–52)
ANION GAP SERPL CALCULATED.3IONS-SCNC: 18 MMOL/L (ref 4–13)
APTT PPP: 30 SECONDS (ref 23–37)
AST SERPL W P-5'-P-CCNC: 58 U/L (ref 13–39)
ATRIAL RATE: 89 BPM
ATRIAL RATE: 97 BPM
BASOPHILS # BLD AUTO: 0.04 THOUSANDS/ÂΜL (ref 0–0.1)
BASOPHILS NFR BLD AUTO: 0 % (ref 0–1)
BILIRUB SERPL-MCNC: 1.07 MG/DL (ref 0.2–1)
BUN SERPL-MCNC: 17 MG/DL (ref 5–25)
CALCIUM SERPL-MCNC: 10.1 MG/DL (ref 8.4–10.2)
CARDIAC TROPONIN I PNL SERPL HS: 2 NG/L
CHLORIDE SERPL-SCNC: 93 MMOL/L (ref 96–108)
CO2 SERPL-SCNC: 23 MMOL/L (ref 21–32)
CREAT SERPL-MCNC: 0.76 MG/DL (ref 0.6–1.3)
EOSINOPHIL # BLD AUTO: 0.07 THOUSAND/ÂΜL (ref 0–0.61)
EOSINOPHIL NFR BLD AUTO: 1 % (ref 0–6)
ERYTHROCYTE [DISTWIDTH] IN BLOOD BY AUTOMATED COUNT: 12.2 % (ref 11.6–15.1)
FLUAV RNA RESP QL NAA+PROBE: NEGATIVE
FLUBV RNA RESP QL NAA+PROBE: NEGATIVE
GFR SERPL CREATININE-BSD FRML MDRD: 99 ML/MIN/1.73SQ M
GLUCOSE SERPL-MCNC: 101 MG/DL (ref 65–140)
GLUCOSE SERPL-MCNC: 83 MG/DL (ref 65–140)
HCG SERPL QL: NEGATIVE
HCT VFR BLD AUTO: 42.2 % (ref 34.8–46.1)
HGB BLD-MCNC: 14.7 G/DL (ref 11.5–15.4)
IMM GRANULOCYTES # BLD AUTO: 0.04 THOUSAND/UL (ref 0–0.2)
IMM GRANULOCYTES NFR BLD AUTO: 0 % (ref 0–2)
INR PPP: 1.02 (ref 0.84–1.19)
LYMPHOCYTES # BLD AUTO: 3.38 THOUSANDS/ÂΜL (ref 0.6–4.47)
LYMPHOCYTES NFR BLD AUTO: 28 % (ref 14–44)
MCH RBC QN AUTO: 31.2 PG (ref 26.8–34.3)
MCHC RBC AUTO-ENTMCNC: 34.8 G/DL (ref 31.4–37.4)
MCV RBC AUTO: 90 FL (ref 82–98)
MONOCYTES # BLD AUTO: 0.72 THOUSAND/ÂΜL (ref 0.17–1.22)
MONOCYTES NFR BLD AUTO: 6 % (ref 4–12)
NEUTROPHILS # BLD AUTO: 7.83 THOUSANDS/ÂΜL (ref 1.85–7.62)
NEUTS SEG NFR BLD AUTO: 65 % (ref 43–75)
NRBC BLD AUTO-RTO: 0 /100 WBCS
P AXIS: 50 DEGREES
P AXIS: 59 DEGREES
PLATELET # BLD AUTO: 301 THOUSANDS/UL (ref 149–390)
PMV BLD AUTO: 10.9 FL (ref 8.9–12.7)
POTASSIUM SERPL-SCNC: 4.4 MMOL/L (ref 3.5–5.3)
PR INTERVAL: 118 MS
PR INTERVAL: 120 MS
PROT SERPL-MCNC: 8.5 G/DL (ref 6.4–8.4)
PROTHROMBIN TIME: 14.1 SECONDS (ref 11.6–14.5)
QRS AXIS: 14 DEGREES
QRS AXIS: 30 DEGREES
QRSD INTERVAL: 88 MS
QRSD INTERVAL: 90 MS
QT INTERVAL: 374 MS
QT INTERVAL: 404 MS
QTC INTERVAL: 474 MS
QTC INTERVAL: 491 MS
RBC # BLD AUTO: 4.71 MILLION/UL (ref 3.81–5.12)
RSV RNA RESP QL NAA+PROBE: NEGATIVE
SARS-COV-2 RNA RESP QL NAA+PROBE: NEGATIVE
SODIUM SERPL-SCNC: 134 MMOL/L (ref 135–147)
T WAVE AXIS: -39 DEGREES
T WAVE AXIS: -39 DEGREES
TSH SERPL DL<=0.05 MIU/L-ACNC: 3.38 UIU/ML (ref 0.45–4.5)
VENTRICULAR RATE: 89 BPM
VENTRICULAR RATE: 97 BPM
WBC # BLD AUTO: 12.08 THOUSAND/UL (ref 4.31–10.16)

## 2023-05-24 RX ORDER — CLOPIDOGREL BISULFATE 75 MG/1
300 TABLET ORAL ONCE
Status: COMPLETED | OUTPATIENT
Start: 2023-05-24 | End: 2023-05-24

## 2023-05-24 RX ORDER — HYDROCHLOROTHIAZIDE 25 MG/1
25 TABLET ORAL DAILY
Status: DISCONTINUED | OUTPATIENT
Start: 2023-05-25 | End: 2023-05-25

## 2023-05-24 RX ORDER — ATORVASTATIN CALCIUM 40 MG/1
40 TABLET, FILM COATED ORAL EVERY EVENING
Status: DISCONTINUED | OUTPATIENT
Start: 2023-05-24 | End: 2023-05-25

## 2023-05-24 RX ORDER — METOPROLOL SUCCINATE 25 MG/1
25 TABLET, EXTENDED RELEASE ORAL DAILY
Status: DISCONTINUED | OUTPATIENT
Start: 2023-05-25 | End: 2023-05-25

## 2023-05-24 RX ORDER — CLOPIDOGREL BISULFATE 75 MG/1
75 TABLET ORAL DAILY
Status: DISCONTINUED | OUTPATIENT
Start: 2023-05-25 | End: 2023-05-26 | Stop reason: HOSPADM

## 2023-05-24 RX ORDER — LORATADINE 10 MG/1
20 TABLET ORAL DAILY
COMMUNITY
End: 2023-05-26

## 2023-05-24 RX ORDER — ASPIRIN 81 MG/1
81 TABLET, CHEWABLE ORAL DAILY
Status: DISCONTINUED | OUTPATIENT
Start: 2023-05-25 | End: 2023-05-26 | Stop reason: HOSPADM

## 2023-05-24 RX ORDER — ASPIRIN 325 MG
325 TABLET ORAL ONCE
Status: COMPLETED | OUTPATIENT
Start: 2023-05-24 | End: 2023-05-24

## 2023-05-24 RX ORDER — HYDROCHLOROTHIAZIDE 25 MG/1
25 TABLET ORAL DAILY
COMMUNITY
End: 2023-05-26

## 2023-05-24 RX ADMIN — ASPIRIN 325 MG: 325 TABLET ORAL at 17:42

## 2023-05-24 RX ADMIN — IOHEXOL 140 ML: 350 INJECTION, SOLUTION INTRAVENOUS at 16:43

## 2023-05-24 RX ADMIN — CLOPIDOGREL BISULFATE 300 MG: 75 TABLET ORAL at 17:41

## 2023-05-24 RX ADMIN — ATORVASTATIN CALCIUM 40 MG: 40 TABLET, FILM COATED ORAL at 20:02

## 2023-05-24 NOTE — ASSESSMENT & PLAN NOTE
· ECG shows T wave inversions in inferior leads (v2, v3 ,aVF)  Previous ECG shows single lead T wave inversion in v3, but not in v2 or aVF  · Reported some chest pressure and SOB during her near syncopal episode, now resolved  · Also with elevated BP on arrival  · Troponin 2 --> 2, delta 0  · Continue to trend troponin, repeat ECG in AM, STAT ECG for any chest pain/SOB  · Obtain echo per stroke pathway  · Cardiology consult  Pt would like to switch from [facility] to [facility] cardiology group

## 2023-05-24 NOTE — ASSESSMENT & PLAN NOTE
"Presented due to lightheadedness and difficulty finding word  This was precipitated by an aura described as \" feeling unwell\" or \" woozy\"  Lightheadedness described as though she was going to pass out, denies vertigo/room spinning  · Hypertensive on arrival at 160/110  · At time of my exam, symptoms have fully resolved  No facial asymmetry, no difficulty talking, AAOx3, able to ambulate independently without any ataxia  · CT head, CTA head/neck, CTA dissection protocol all without any acute intracranial abnormalities  · Evaluated by neurology who recommended admission with stroke pathway:  · Loaded with aspirin/Plavix, continue with ASA 81 mg daily and Plavix 75mg daily  · Check A1c, lipid panel, TSH  · Check echocardiogram  · MRI brain  · Neurochecks  · Telemetry  · Able to ambulate independently-does not require PT/OT evaluation  · Neurology consult    Appreciate recs  " You were seen in the Emergency Department (ED) for discoloration of your palms.    Please follow up with your OBGYN next week.   Please immediately return to the ED if you experience any new, concerning, or worsening symptoms, including, but not limited to the following: persistent, or worsening pain, chest pain, difficulty breathing, fever, chills. Thank you for choosing a [facility] ED.

## 2023-05-24 NOTE — ASSESSMENT & PLAN NOTE
Hx of migraines, on PRN sumatriptan  Hold triptans until CVA ruled out    Denied any headaches/migraine prior to her stroke-like episode

## 2023-05-24 NOTE — H&P
"[place]  H&P  Name: Roberto Thomson 44 y o  female I MRN: [id number]  Unit/Bed#: ED 07 I Date of Admission: 5/24/2023   Date of Service: 5/24/2023 I Hospital Day: 0      Assessment/Plan   * Stroke-like symptoms  Assessment & Plan  Presented due to lightheadedness and difficulty finding word  This was precipitated by an aura described as \" feeling unwell\" or \" woozy\"  Lightheadedness described as though she was going to pass out, denies vertigo/room spinning  · Hypertensive on arrival at 160/110  · At time of my exam, symptoms have fully resolved  No facial asymmetry, no difficulty talking, AAOx3, able to ambulate independently without any ataxia  · CT head, CTA head/neck, CTA dissection protocol all without any acute intracranial abnormalities  · Evaluated by neurology who recommended admission with stroke pathway:  · Loaded with aspirin/Plavix, continue with ASA 81 mg daily and Plavix 75mg daily  · Check A1c, lipid panel, TSH  · Check echocardiogram  · MRI brain  · Neurochecks  · Telemetry  · Able to ambulate independently-does not require PT/OT evaluation  · Neurology consult  Appreciate recs    Abnormal ECG  Assessment & Plan  · ECG shows T wave inversions in inferior leads (v2, v3 ,aVF)  Previous ECG shows single lead T wave inversion in v3, but not in v2 or aVF  · Reported some chest pressure and SOB during her near syncopal episode, now resolved  · Also with elevated BP on arrival  · Troponin 2 --> 2, delta 0  · Continue to trend troponin, repeat ECG in AM, STAT ECG for any chest pain/SOB  · Obtain echo per stroke pathway  · Cardiology consult  Pt would like to switch from [facility] to [facility] cardiology group      Hypertension  Assessment & Plan  New onset HTN as of 3 weeks ago  Started on HCTZ 25mg and Toprol XL 25mg by [facility] cardiologist  Reports fluctuating BP at home sporadically, sometimes 120/80, sometimes 160/110  Currently on permissive HTN per stroke pathway, " "restart Toprol-XL and HCTZ once CVA ruled out    Migraine  Assessment & Plan  Hx of migraines, on PRN sumatriptan  Hold triptans until CVA ruled out  Denied any headaches/migraine prior to her stroke-like episode         VTE Pharmacologic Prophylaxis: VTE Score: 1 Low Risk (Score 0-2) - Encourage Ambulation  Code Status: Prior level 1 full code  Discussion with family: Updated  () at bedside  Anticipated Length of Stay: Patient will be admitted on an inpatient basis with an anticipated length of stay of greater than 2 midnights secondary to Strokelike symptoms  Total Time Spent on Date of Encounter in care of patient: 55 minutes This time was spent on one or more of the following: performing physical exam; counseling and coordination of care; obtaining or reviewing history; documenting in the medical record; reviewing/ordering tests, medications or procedures; communicating with other healthcare professionals and discussing with patient's family/caregivers  Chief Complaint: Strokelike symptoms    History of Present Illness:  Jesus Santana is a 44 y o  female with a PMH of recently diagnosed with HTN, history of post eclampsia who presents with strokelike episode  Patient reports being at her mother's house when she began to feel unwell/ \" woozy\"  Thought it was related to her blood pressure and sat down, however once she stood up she felt significantly lightheaded described as though her eyes were going to rolled to the back of her head and she was going to pass out  Denied any room spinning/vertigo  She then developed difficulty speaking, some chest pressure and shortness of breath and continued to feel lightheaded prompting a call to EMS  BP by EMS was in the 717V systolic, on arrival [id number]/683  In the ED, she was a stroke alert    Underwent CT head, CTA head/neck, CTA dissection protocol which were all normal   She was evaluated by neurology recommended admission " under stroke pathway, loaded with aspirin/Plavix  Review of Systems:  Review of Systems   Constitutional: Negative for appetite change, chills, fatigue and fever  HENT: Negative for ear pain, sore throat and trouble swallowing  Eyes: Negative for visual disturbance  Respiratory: Positive for chest tightness and shortness of breath  Negative for cough and wheezing  Cardiovascular: Negative for chest pain, palpitations and leg swelling  Gastrointestinal: Negative for abdominal distention, abdominal pain, diarrhea, nausea and vomiting  Endocrine: Negative  Genitourinary: Negative for dysuria, flank pain and hematuria  Musculoskeletal: Negative for arthralgias, gait problem and myalgias  Skin: Negative for pallor  Allergic/Immunologic: Negative for immunocompromised state  Neurological: Positive for syncope ( near- syncope), speech difficulty, weakness, light-headedness and numbness ( diffuse)  Negative for dizziness and headaches  Past Medical and Surgical History:   Past Medical History:   Diagnosis Date   • Acid reflux    • Bicuspid aortic valve    • COVID-19 affecting pregnancy in second trimester 12/12/2021   • COVID-19 affecting pregnancy in third trimester 1/20/2022    Hospitalized 12/12-12/19/2021 with Covid pneumonia   • Depression 2004    in 2004 after termination of pregnancy  was tx with zoloft    • Diabetes mellitus ([facility] [address] )     prediabetic   • Gestational hypertension 2011    third trimester with her 1st baby  del @ 38 weeks   • Gestational hypertension, third trimester 1/27/2022    First pregnancy, IOL for gestational HTN 9/16/2021 baseline LFTs and creatinine normal 10/6/2021 24hr urine collection 147mg  Patient taking ASA 162mg daily - stopped ASA 3/14/2022  Seen L&D 3/12/2022 - one elevated BP, others normal - labs  Normal   D/c home w/ plan for f/u in office 3/15/2022     • High-risk pregnancy    • HX DVT (deep venous thrombosis) (HCC)     in right axilla -- complication from thoracic outlet syndrome   • Hypertension in pregnancy, preeclampsia, severe, delivered/postpartum 4/6/2022    Patient with gestational HTN in pregnancy and induced for same at 42 weeks  Labs were normal and she continued to have mildly elevated BP after delivery  PPD#16 she presented with blood pressures 150/100 and a headache for 4 days  She does have h/o migraines  Admitted for severe preeclampsia and started on magnesium  • PCOS (polycystic ovarian syndrome) 2010    initally diagnosed in 2010 tx with metformin  pt d/c med and then restarted in 2016   • Pituitary microadenoma ([address] ) 2009    last MRI in 2009   • Prediabetes 2016    'impaired fasting glucose'    • Thoracic outlet syndrome 2004    in 2004 per [facility]; has surgical repair       Past Surgical History:   Procedure Laterality Date   • BONE RESECTION, RIB Right 2004   • WISDOM TOOTH EXTRACTION Bilateral        Meds/Allergies:  Prior to Admission medications    Medication Sig Start Date End Date Taking? Authorizing Provider   EPINEPHrine (EPIPEN) 0 3 mg/0 3 mL SOAJ Inject 0 3 mg into a muscle every 8 (eight) hours as needed for anaphylaxis    Historical Provider, MD   NIFEdipine (PROCARDIA XL) 30 mg 24 hr tablet Take 1 tablet (30 mg total) by mouth daily 4/9/22 5/9/22  Los Hagen DO   norethindrone (Ortho Micronor) 0 35 MG tablet Take 1 tablet (0 35 mg total) by mouth daily 8/9/22   Aracely Livingston MD   omeprazole (PriLOSEC) 20 mg delayed release capsule Take 20 mg by mouth 1/1/21   Historical Provider, MD   Prenatal Vit-Fe Fumarate-FA (PRENATAL 1+1 PO) Take 1 tablet by mouth daily    Historical Provider, MD   SUMAtriptan (IMITREX) 50 mg tablet Take 1 tablet (50 mg total) by mouth once as needed for migraine for up to 1 dose 4/8/22   Los Hagen DO     I have reviewed home medications with patient personally  Allergies:    Allergies   Allergen Reactions   • Gadobutrol Cough and Itching   • Nuts - Food Allergy Anaphylaxis   • Shellfish-Derived Products - Food Allergy Anaphylaxis   • Shrimp Extract Allergy Skin Test - Food Allergy Headache   • Venlafaxine Anaphylaxis     Per [facility] records   • Gadolinium Derivatives Other (See Comments)     Pt sts allergy to a contrast in MRI years ago per pt (originally documented as a CT contrast- pt had CT contrast 5/24/23 without incident)   • Hydromorphone Itching     During labor had reaction   • Morphine Itching   • Peanut Oil - Food Allergy Other (See Comments)     Itching, trouble breathing       Social History:  Marital Status: Registered Domestic Partner   Occupation: Noncontributory  Patient Pre-hospital Living Situation: Home  Patient Pre-hospital Level of Mobility: walks  Patient Pre-hospital Diet Restrictions: None  Substance Use History:   Social History     Substance and Sexual Activity   Alcohol Use Yes    Comment: social     Social History     Tobacco Use   Smoking Status Never   Smokeless Tobacco Never     Social History     Substance and Sexual Activity   Drug Use Never       Family History:  History reviewed  No pertinent family history  Physical Exam:     Vitals:   Blood Pressure: 137/94 (05/24/23 1745)  Pulse: 80 (05/24/23 1745)  Temperature: 98 2 °F (36 8 °C) (05/24/23 1541)  Temp Source: Oral (05/24/23 1541)  Respirations: 20 (05/24/23 1745)  Weight - Scale: 90 7 kg (200 lb) (05/24/23 1540)  SpO2: 95 % (05/24/23 1745)    Physical Exam  Vitals and nursing note reviewed  Constitutional:       Appearance: Normal appearance  HENT:      Head: Normocephalic and atraumatic  Mouth/Throat:      Mouth: Mucous membranes are moist       Pharynx: Oropharynx is clear  No oropharyngeal exudate  Eyes:      Extraocular Movements: Extraocular movements intact  Cardiovascular:      Rate and Rhythm: Normal rate and regular rhythm  Pulses: Normal pulses  Heart sounds: Normal heart sounds  No murmur heard  No friction rub  No gallop     Pulmonary:      Effort: Pulmonary effort is normal  No respiratory distress  Breath sounds: Normal breath sounds  No stridor  No wheezing or rales  Abdominal:      General: Abdomen is flat  Bowel sounds are normal  There is no distension  Palpations: Abdomen is soft  Tenderness: There is no abdominal tenderness  Musculoskeletal:      Right lower leg: No edema  Left lower leg: No edema  Skin:     General: Skin is warm and dry  Neurological:      General: No focal deficit present  Mental Status: She is alert and oriented to person, place, and time  Cranial Nerves: No cranial nerve deficit  Motor: No weakness  Gait: Gait normal          Additional Data:     Lab Results:  Results from last 7 days   Lab Units 05/24/23  1546   EOS PCT % 1   HEMATOCRIT % 42 2   HEMOGLOBIN g/dL 14 7   LYMPHS PCT % 28   MONOS PCT % 6   NEUTROS PCT % 65   PLATELETS Thousands/uL 301   WBC Thousand/uL 12 08*     Results from last 7 days   Lab Units 05/24/23  1546   ANION GAP mmol/L 18*   ALBUMIN g/dL 4 7   ALK PHOS U/L 103   ALT U/L 36   AST U/L 58*   BUN mg/dL 17   CALCIUM mg/dL 10 1   CHLORIDE mmol/L 93*   CO2 mmol/L 23   CREATININE mg/dL 0 76   GLUCOSE RANDOM mg/dL 101   POTASSIUM mmol/L 4 4   SODIUM mmol/L 134*   TOTAL BILIRUBIN mg/dL 1 07*     Results from last 7 days   Lab Units 05/24/23  1546   INR  1 02     Results from last 7 days   Lab Units 05/24/23  1618   POC GLUCOSE mg/dl 83               Lines/Drains:  Invasive Devices     Peripheral Intravenous Line  Duration           Peripheral IV 05/24/23 Right Antecubital <1 day                    Imaging: Reviewed radiology reports from this admission including: CT head, CTA head/neck, CTA dissection protocol  CT stroke alert brain   Final Result by Annelise Arthur MD (05/24 1706)      No evidence of acute vascular territorial infarction, intracranial hemorrhage or mass        Findings were directly discussed with Chantel Ramirez  at  4:40 PM         Workstation performed: [id number]         CTA stroke alert (head/neck)   Final Result by Rossy Arnold MD (05/24 1705)      No stenosis, dissection or occlusion of the carotid or vertebral arteries or major vessels of the [place]  Findings were directly discussed with Chantel Ramirez  at   5:00 PM                             Workstation performed: [id number]         CTA dissection protocol chest/abdomen/pelvis   Final Result by Rossy Arnold MD (05/24 1721)         1  No evidence of thoracic or abdominal aortic aneurysm or dissection  2  No evidence of acute cardiopulmonary process  3  Hepatic steatosis  Workstation performed: [id number]         XR chest 2 views   Final Result by Mami Fabian MD (05/24 1704)      No acute cardiopulmonary disease  Workstation performed: [id number]             EKG and Other Studies Reviewed on Admission:   · EKG: NSR  HR 97, T wave inversions in V2, V3, aVF (has known v3 t wave inversion)  ** Please Note: This note has been constructed using a voice recognition system   **

## 2023-05-24 NOTE — CONSULTS
Stroke Consultation   Annette Thomson 44 y o  female MRN: [id number]  Unit/Bed#: ED 07 Encounter: [id number]    Total critical care time spent with direct patient care and coordination of care today at least 45 minutes  Assessment/Plan   Assessment:     43 yo female seen in emergent consultation as stroke alert  Rapidly improving symptoms NIHSS 0, thus TNK-tPA not given  Concern for cardiac etiology and secondary TIA vs migraine related pathology  Patient during initial ED eval with NIHSS 3 with mild left sided weakness prior to my arrival, thus will treat this as TIA  Plan:  Recommend admit under TIA/stroke protocol   load  Recommend Plavix 300 load also, if okay from  Cardiac standpoint- currently undergoing aortic dissection study/eval  Recommend bedside swallow screen before PO meds  Atorvastatin 40 mg  Permissive HTN SBP > 120 recommended however if she has an underlying acute cardiac issue as EKG abnormal per ED physician then will defer the SBP max parameters to primary team/ED/cardiology as appropriate  Telemetry  ECHOcardiogram  PT/OT/SLP eval  Check HgbA1C/ lipid panel    Leukocytosis  - Further infectious work up per primary team  - COVID testing sent by primary team    Discussed with ED physician will follow up      History of Present Illness       Physician Requesting Consult: Kody Alcala DO  Reason for Consult / Principal Problem: Stroke Alert  Patient last known well: 230 PM  Stroke alert called: 410 PM  Neurology time of arrival/evaluation: 414 PM  TPA Decision: Patient not a candidate  Symptoms resolved/clearly non disabling   NIHSS at the time of my assessment initially 0 and again repeat assessment after CT scan at 4 50 PM NIHSS 0         HPI: Annette Thomson is a 44y o  year old  female with hx of thoracic outlet syndrome s/p surgery 2004, HTN in pregnancy primarily and recently OP started on antihypertensives for HTN and heart palpitations, bicuspid aortic "valve with intact EF per 2021 ECHO, prediabetic who presents with chest pressure x 3 days as well as whole body tingling and \"dizzy\" sensation starting at 230 PM today  Patient reports feeling completely normal prior  Patient states she was just sitting down when this started  Also reports having a routine massage which she gets from time to time around 11 AM, no \"neck cracking\" or other aggressive maneuvers doing this  She also reports mild migrainous headache now on the left- states was severe earlier  States migraines typically are right sided and have never been associated with other neurologic deficits similar to today  Denies prior hx stroke, TIA, blood clots other than in 2004 due to a structural issue with TOS- no other clotting afterward  No known cancer history  No pertinent family hx per patient  No recent surgical procedures  No hx of abnormal bleeding  Pt not on antiplatelet at home    No recent trauma reported        10 point ROS completed and negative other than that noted above    Historical Information   Past Medical History:   Diagnosis Date   • Acid reflux    • Bicuspid aortic valve    • COVID-19 affecting pregnancy in second trimester 12/12/2021   • COVID-19 affecting pregnancy in third trimester 1/20/2022    Hospitalized 12/12-12/19/2021 with Covid pneumonia   • Depression 2004    in 2004 after termination of pregnancy  was tx with zoloft    • Diabetes mellitus ([facility] [address] )     prediabetic   • Gestational hypertension 2011    third trimester with her 1st baby  del @ 38 weeks   • Gestational hypertension, third trimester 1/27/2022    First pregnancy, IOL for gestational HTN 9/16/2021 baseline LFTs and creatinine normal 10/6/2021 24hr urine collection 147mg  Patient taking ASA 162mg daily - stopped ASA 3/14/2022  Seen L&D 3/12/2022 - one elevated BP, others normal - labs  Normal   D/c home w/ plan for f/u in office 3/15/2022     • High-risk pregnancy    • HX DVT (deep venous thrombosis) ([address] )     " in right axilla -- complication from thoracic outlet syndrome   • Hypertension in pregnancy, preeclampsia, severe, delivered/postpartum 4/6/2022    Patient with gestational HTN in pregnancy and induced for same at 42 weeks  Labs were normal and she continued to have mildly elevated BP after delivery  PPD#16 she presented with blood pressures 150/100 and a headache for 4 days  She does have h/o migraines  Admitted for severe preeclampsia and started on magnesium  • PCOS (polycystic ovarian syndrome) 2010    initally diagnosed in 2010 tx with metformin  pt d/c med and then restarted in 2016   • Pituitary microadenoma ([address] ) 2009    last MRI in 2009   • Prediabetes 2016    'impaired fasting glucose'    • Thoracic outlet syndrome 2004    in 2004 per [facility] records; has surgical repair     Past Surgical History:   Procedure Laterality Date   • BONE RESECTION, RIB Right 2004   • WISDOM TOOTH EXTRACTION Bilateral      Social History   Social History     Substance and Sexual Activity   Alcohol Use Yes    Comment: social     Social History     Substance and Sexual Activity   Drug Use Never     Social History     Tobacco Use   Smoking Status Never   Smokeless Tobacco Never       Family History: History reviewed  No pertinent family history      Meds/Allergies   all current active meds have been reviewed    Allergies   Allergen Reactions   • Gadobutrol Cough and Itching   • Nuts - Food Allergy Anaphylaxis   • Shellfish-Derived Products - Food Allergy Anaphylaxis   • Shrimp Extract Allergy Skin Test - Food Allergy Headache   • Venlafaxine Anaphylaxis     Per [facility] records   • Gadolinium Derivatives Other (See Comments)     Pt sts allergy to a contrast in MRI years ago per pt (originally documented as a CT contrast- pt had CT contrast 5/24/23 without incident)   • Hydromorphone Itching     During labor had reaction   • Morphine Itching   • Peanut Oil - Food Allergy Other (See Comments)     Itching, trouble breathing       Objective   Vitals:Blood pressure 135/82, pulse 79, temperature 98 2 °F (36 8 °C), temperature source Oral, resp  rate 20, weight 90 7 kg (200 lb), SpO2 100 %, not currently breastfeeding  ,Body mass index is 32 53 kg/m²  Gen: NAD  HEENT: NCAT, EOMI  Resp: Symmetric chest rise bl  CVS: RRR  Ext: no edema    AO X 4 no aphasia or dysarthria noted  CN 2-12 grossly intact  Motor: 5/5 ext x 4  Sensation: Intact to LT x 4 ext  Cerebellar: No dysmetria b/l  Gait: can stand unsupported with romberg with no clear sway        NIHSS:  1a Level of Consciousness: 0 = Alert   1b  LOC Questions: 0 = Answers both correctly   1c  LOC Commands: 0 = Obeys both correctly   2  Best Gaze: 0 = Normal   3  Visual: 0 = No visual field loss   4  Facial Palsy: 0=Normal symmetric movement   5a  Motor Right Arm: 0=No drift, limb holds 90 (or 45) degrees for full 10 seconds   5b  Motor Left Arm: 0=No drift, limb holds 90 (or 45) degrees for full 10 seconds   6a  Motor Right Le=No drift, limb holds 90 (or 45) degrees for full 10 seconds   6b  Motor Left Le=No drift, limb holds 90 (or 45) degrees for full 10 seconds   7  Limb Ataxia:  0=Absent   8  Sensory: 0=Normal; no sensory loss   9  Best Language:  0=No aphasia, normal   10  Dysarthria: 0   11  Extinction and Inattention (formerly Neglect): 0=No abnormality   Total Score: 0     Time NIHSS was completed: 414 PM    Lab Results: I have personally reviewed pertinent reports  Imaging Studies: I have personally reviewed pertinent films in PACS  EKG, Pathology, and Other Studies: I have personally reviewed pertinent reports

## 2023-05-24 NOTE — ED NOTES
Pt had 1 hour massage at 11am, was able to drive to her mothers and then started to feel funny, pressure behind left eye, whole body felt heavy and tingling, around 230pm was having trouble getting words out like pressured speech   I dont feel right      Bessie Rush, RICHAR  05/24/23 2108

## 2023-05-24 NOTE — ED PROVIDER NOTES
History  Chief Complaint   Patient presents with   • Dizziness     Dizziness, htn, generalized weakness  Chest tightness and SOB  Patient is a 39-year-old female who presents with multiple complaints  States that she had a massage (gets them monthly/ no chiropractic manipulation) at 11am, then drove to mother's house and spent about an hour at [employer]  Then suddenly developed lightheadedness, heaviness over entire body, tingling over the left side of face and arm, troubling speaking/ word finding, and pressure over the left side of the head and pressure behind the left eye  Also reports chest pain x 3 days that she describes as a pressure over the anterior chest, non-radiating, constant, moderate in intensity  States that recently over last few weeks has had elevated blood pressure and that her cardiologist started her on HCTZ and metoprolol  Prior to Admission Medications   Prescriptions Last Dose Informant Patient Reported? Taking?    EPINEPHrine (EPIPEN) 0 3 mg/0 3 mL SOAJ  Self Yes No   Sig: Inject 0 3 mg into a muscle every 8 (eight) hours as needed for anaphylaxis   NIFEdipine (PROCARDIA XL) 30 mg 24 hr tablet   No No   Sig: Take 1 tablet (30 mg total) by mouth daily   Prenatal Vit-Fe Fumarate-FA (PRENATAL 1+1 PO)  Self Yes No   Sig: Take 1 tablet by mouth daily   SUMAtriptan (IMITREX) 50 mg tablet   No No   Sig: Take 1 tablet (50 mg total) by mouth once as needed for migraine for up to 1 dose   norethindrone (Ortho Micronor) 0 35 MG tablet   No No   Sig: Take 1 tablet (0 35 mg total) by mouth daily   omeprazole (PriLOSEC) 20 mg delayed release capsule  Self Yes No   Sig: Take 20 mg by mouth      Facility-Administered Medications: None       Past Medical History:   Diagnosis Date   • Acid reflux    • Bicuspid aortic valve    • COVID-19 affecting pregnancy in second trimester 12/12/2021   • COVID-19 affecting pregnancy in third trimester 1/20/2022    Hospitalized 12/12-12/19/2021 with Covid pneumonia   • Depression 2004    in 2004 after termination of pregnancy  was tx with zoloft    • Diabetes mellitus ([address] )     prediabetic   • Gestational hypertension 2011    third trimester with her 1st baby  del @ 38 weeks   • Gestational hypertension, third trimester 1/27/2022    First pregnancy, IOL for gestational HTN 9/16/2021 baseline LFTs and creatinine normal 10/6/2021 24hr urine collection 147mg  Patient taking ASA 162mg daily - stopped ASA 3/14/2022  Seen L&D 3/12/2022 - one elevated BP, others normal - labs  Normal   D/c home w/ plan for f/u in office 3/15/2022  • High-risk pregnancy    • HX DVT (deep venous thrombosis) (HCC)     in right axilla -- complication from thoracic outlet syndrome   • Hypertension in pregnancy, preeclampsia, severe, delivered/postpartum 4/6/2022    Patient with gestational HTN in pregnancy and induced for same at 37 weeks  Labs were normal and she continued to have mildly elevated BP after delivery  PPD#16 she presented with blood pressures 150/100 and a headache for 4 days  She does have h/o migraines  Admitted for severe preeclampsia and started on magnesium  • PCOS (polycystic ovarian syndrome) 2010    initally diagnosed in 2010 tx with metformin  pt d/c med and then restarted in 2016   • Pituitary microadenoma [facility]) 2009    last MRI in 2009   • Prediabetes 2016    'impaired fasting glucose'    • Thoracic outlet syndrome 2004    in 2004 per [facility] records; has surgical repair       Past Surgical History:   Procedure Laterality Date   • BONE RESECTION, RIB Right 2004   • WISDOM TOOTH EXTRACTION Bilateral        History reviewed  No pertinent family history  I have reviewed and agree with the history as documented      E-Cigarette/Vaping   • E-Cigarette Use Never User      E-Cigarette/Vaping Substances   • Nicotine No    • THC No    • CBD Yes before pregnancy for anxiety   • Flavoring No      Social History     Tobacco Use   • Smoking status: Never   • Smokeless tobacco: Never   Vaping Use   • Vaping Use: Never used   Substance Use Topics   • Alcohol use: Yes     Comment: social   • Drug use: Never       Review of Systems   Constitutional: Negative for chills and fever  Eyes: Negative for photophobia and visual disturbance  Respiratory: Positive for chest tightness and shortness of breath  Cardiovascular: Positive for chest pain  Negative for palpitations and leg swelling  Gastrointestinal: Negative for abdominal pain, diarrhea, nausea and vomiting  Genitourinary: Negative for dysuria, flank pain and hematuria  Musculoskeletal: Negative for back pain and neck pain  Neurological: Positive for dizziness, syncope (near-syncope), speech difficulty, weakness, light-headedness and numbness  Negative for facial asymmetry and headaches  Physical Exam  Physical Exam  Vitals and nursing note reviewed  Constitutional:       General: She is not in acute distress  Appearance: Normal appearance  She is not ill-appearing, toxic-appearing or diaphoretic  HENT:      Head: Normocephalic and atraumatic  Mouth/Throat:      Mouth: Mucous membranes are moist    Eyes:      General: No visual field deficit  Extraocular Movements: Extraocular movements intact  Conjunctiva/sclera: Conjunctivae normal       Pupils: Pupils are equal, round, and reactive to light  Cardiovascular:      Rate and Rhythm: Normal rate and regular rhythm  Pulses: Normal pulses  Heart sounds: Normal heart sounds  No murmur heard  Pulmonary:      Effort: Pulmonary effort is normal  No respiratory distress  Breath sounds: Normal breath sounds  No stridor  No wheezing, rhonchi or rales  Chest:      Chest wall: No tenderness  Abdominal:      General: Bowel sounds are normal  There is no distension  Palpations: Abdomen is soft  Tenderness: There is no abdominal tenderness  There is no guarding or rebound     Musculoskeletal: Cervical back: Normal range of motion and neck supple  No rigidity  Right lower leg: No edema  Left lower leg: No edema  Skin:     General: Skin is warm and dry  Neurological:      General: No focal deficit present  Mental Status: She is alert and oriented to person, place, and time  Mental status is at baseline  GCS: GCS eye subscore is 4  GCS verbal subscore is 5  GCS motor subscore is 6  Cranial Nerves: Dysarthria present  No cranial nerve deficit or facial asymmetry  Sensory: Sensory deficit present  Motor: Weakness present  No tremor, abnormal muscle tone or pronator drift        Coordination: Finger-Nose-Finger Test and Heel to [employer] normal       Comments: Speech is clear  Focusing on finding correct words  LUE is slightly weaker than the RUE (5- vs 5)  No pronator drift  Subjectively describes tingling sensation to the LUE, but objectively feels both UE and LEs   Psychiatric:         Mood and Affect: Mood normal          Behavior: Behavior normal          Vital Signs  ED Triage Vitals   Temperature Pulse Respirations Blood Pressure SpO2   05/24/23 1541 05/24/23 1540 05/24/23 1540 05/24/23 1540 05/24/23 1540   98 2 °F (36 8 °C) 96 20 (!) 161/106 97 %      Temp Source Heart Rate Source Patient Position - Orthostatic VS BP Location FiO2 (%)   05/24/23 1541 05/24/23 1540 05/24/23 1540 05/24/23 1540 --   Oral Monitor Sitting Right arm       Pain Score       05/24/23 1540       No Pain           Vitals:    05/24/23 1700 05/24/23 1715 05/24/23 1730 05/24/23 1745   BP: 143/91 133/88 122/82 137/94   Pulse: 80 77 75 80   Patient Position - Orthostatic VS: Sitting Sitting Sitting          Visual Acuity  Visual Acuity    Flowsheet Row Most Recent Value   L Pupil Size (mm) 3   R Pupil Size (mm) 3          ED Medications  Medications   iohexol (OMNIPAQUE) 350 MG/ML injection (MULTI-DOSE) 140 mL (140 mL Intravenous Given 5/24/23 1643)   aspirin tablet 325 mg (325 mg Oral Given 5/24/23 1742)   clopidogrel (PLAVIX) tablet 300 mg (300 mg Oral Given 5/24/23 1741)       Diagnostic Studies  Results Reviewed     Procedure Component Value Units Date/Time    HS Troponin I 2hr [[id number]] Collected: 05/24/23 1746    Lab Status: In process Specimen: Blood from Arm, Right Updated: 05/24/23 1752    FLU/RSV/COVID - if FLU/RSV clinically relevant [[id number]]  (Normal) Collected: 05/24/23 1619    Lab Status: Final result Specimen: Nares from Nose Updated: 05/24/23 1706     SARS-CoV-2 Negative     INFLUENZA A PCR Negative     INFLUENZA B PCR Negative     RSV PCR Negative    Narrative:      FOR PEDIATRIC PATIENTS - copy/paste COVID Guidelines URL to browser: [email or web address]  [employer]    SARS-CoV-2 assay is a Nucleic Acid Amplification assay intended for the  qualitative detection of nucleic acid from SARS-CoV-2 in nasopharyngeal  swabs  Results are for the presumptive identification of SARS-CoV-2 RNA  Positive results are indicative of infection with SARS-CoV-2, the virus  causing COVID-19, but do not rule out bacterial infection or co-infection  with other viruses  Laboratories within the [place] and its  territories are required to report all positive results to the appropriate  public health authorities  Negative results do not preclude SARS-CoV-2  infection and should not be used as the sole basis for treatment or other  patient management decisions  Negative results must be combined with  clinical observations, patient history, and epidemiological information  This test has not been FDA cleared or approved  This test has been authorized by FDA under an Emergency Use Authorization  (EUA)   This test is only authorized for the duration of time the  declaration that circumstances exist justifying the authorization of the  emergency use of an in vitro diagnostic tests for detection of SARS-CoV-2  virus and/or diagnosis of COVID-19 infection under section 564(b)(1) of  the Act, 21 U  S C  003WUC-1(L)(7), unless the authorization is terminated  or revoked sooner  The test has been validated but independent review by FDA  and CLIA is pending  Test performed using [employer] Gene[employer]: This RT-PCR assay targets N2,  a region unique to SARS-CoV-2  A conserved region in the E-gene was chosen  for pan-Sarbecovirus detection which includes SARS-CoV-2  According to CMS-2020-01-R, this platform meets the definition of high-throughput technology      Protime-INR [[id number]]  (Normal) Collected: 05/24/23 1546    Lab Status: Final result Specimen: Blood from Arm, Left Updated: 05/24/23 1623     Protime 14 1 seconds      INR 1 02    APTT [[id number]]  (Normal) Collected: 05/24/23 1546    Lab Status: Final result Specimen: Blood from Arm, Left Updated: 05/24/23 1623     PTT 30 seconds     Fingerstick Glucose (POCT) [[id number]]  (Normal) Collected: 05/24/23 1618    Lab Status: Final result Updated: 05/24/23 1619     POC Glucose 83 mg/dl     Comprehensive metabolic panel [[id number]]  (Abnormal) Collected: 05/24/23 1546    Lab Status: Final result Specimen: Blood from Arm, Left Updated: 05/24/23 1617     Sodium 134 mmol/L      Potassium 4 4 mmol/L      Chloride 93 mmol/L      CO2 23 mmol/L      ANION GAP 18 mmol/L      BUN 17 mg/dL      Creatinine 0 76 mg/dL      Glucose 101 mg/dL      Calcium 10 1 mg/dL      AST 58 U/L      ALT 36 U/L      Alkaline Phosphatase 103 U/L      Total Protein 8 5 g/dL      Albumin 4 7 g/dL      Total Bilirubin 1 07 mg/dL      eGFR 99 ml/min/1 73sq m     Narrative:      Linda guidelines for Chronic Kidney Disease (CKD):   •  Stage 1 with normal or high GFR (GFR > 90 mL/min/1 73 square meters)  •  Stage 2 Mild CKD (GFR = 60-89 mL/min/1 73 square meters)  •  Stage 3A Moderate CKD (GFR = 45-59 mL/min/1 73 square meters)  •  Stage 3B Moderate CKD (GFR = 30-44 mL/min/1 73 square meters)  •  Stage 4 Severe CKD (GFR = 15-29 mL/min/1 73 square meters)  •  Stage 5 End Stage CKD (GFR <15 mL/min/1 73 square meters)  Note: GFR calculation is accurate only with a steady state creatinine    HS Troponin 0hr (reflex protocol) [[id number]]  (Normal) Collected: 05/24/23 1546    Lab Status: Final result Specimen: Blood from Arm, Left Updated: 05/24/23 1615     hs TnI 0hr 2 ng/L     HS Troponin I 4hr [[id number]]     Lab Status: No result Specimen: Blood     CBC and differential [[id number]]  (Abnormal) Collected: 05/24/23 1546    Lab Status: Final result Specimen: Blood from Arm, Left Updated: 05/24/23 1552     WBC 12 08 Thousand/uL      RBC 4 71 Million/uL      Hemoglobin 14 7 g/dL      Hematocrit 42 2 %      MCV 90 fL      MCH 31 2 pg      MCHC 34 8 g/dL      RDW 12 2 %      MPV 10 9 fL      Platelets 096 Thousands/uL      nRBC 0 /100 WBCs      Neutrophils Relative 65 %      Immat GRANS % 0 %      Lymphocytes Relative 28 %      Monocytes Relative 6 %      Eosinophils Relative 1 %      Basophils Relative 0 %      Neutrophils Absolute 7 83 Thousands/µL      Immature Grans Absolute 0 04 Thousand/uL      Lymphocytes Absolute 3 38 Thousands/µL      Monocytes Absolute 0 72 Thousand/µL      Eosinophils Absolute 0 07 Thousand/µL      Basophils Absolute 0 04 Thousands/µL                  CT stroke alert brain   Final Result by Matthew Castañeda MD (05/24 1706)      No evidence of acute vascular territorial infarction, intracranial hemorrhage or mass  Findings were directly discussed with Chantel Ramirez  at  4:40 PM         Workstation performed: [id number]         CTA stroke alert (head/neck)   Final Result by Matthew Castañeda MD (05/24 1705)      No stenosis, dissection or occlusion of the carotid or vertebral arteries or major vessels of the [place]              Findings were directly discussed with Chantel Ramirez  at   5:00 PM                             Workstation performed: [id number]         CTA dissection protocol chest/abdomen/pelvis   Final Result by Regi Veloz MD (05/24 1721)         1  No evidence of thoracic or abdominal aortic aneurysm or dissection  2  No evidence of acute cardiopulmonary process  3  Hepatic steatosis  Workstation performed: [id number]         XR chest 2 views   Final Result by Abhay Wen MD (05/24 1704)      No acute cardiopulmonary disease                    Workstation performed: [id number]                    Procedures  ECG 12 Lead Documentation Only    Date/Time: 5/24/2023 3:55 PM    Performed by: Leena Sorensen DO  Authorized by: Leena Sorensen DO    Indications / Diagnosis:  Cp  ECG reviewed by me, the ED Provider: yes    Patient location:  ED  Previous ECG:     Previous ECG:  Compared to current    Comparison ECG info:  3/25/22    Similarity:  Changes noted  Interpretation:     Interpretation: non-specific    Rate:     ECG rate:  89    ECG rate assessment: normal    Rhythm:     Rhythm: sinus rhythm    Ectopy:     Ectopy: none    QRS:     QRS axis:  Normal    QRS intervals:  Normal  Conduction:     Conduction: normal    ST segments:     ST segments:  Non-specific  T waves:     T waves: non-specific    Comments:      qtc 491  CriticalCare Time    Date/Time: 5/24/2023 5:59 PM    Performed by: Leena Sorensen DO  Authorized by: Leena Sorensen DO    Critical care provider statement:     Critical care time (minutes):  35    Critical care start time:  5/24/2023 3:59 PM    Critical care end time:  5/24/2023 5:00 PM    Critical care time was exclusive of:  Separately billable procedures and treating other patients and teaching time    Critical care was time spent personally by me on the following activities:  Blood draw for specimens, obtaining history from patient or surrogate, development of treatment plan with patient or surrogate, discussions with consultants, discussions with primary provider, evaluation of patient's response to treatment, examination of patient, review of old charts, re-evaluation of patient's condition, ordering and review of radiographic studies, ordering and review of laboratory studies and ordering and performing treatments and interventions    I assumed direction of critical care for this patient from another provider in my specialty: no               ED Course  ED Course as of 05/24/23 1759   Wed May 24, 2023   1618 Dr Talha Rowell at bedside  [id number] Patient has an allergy listed to IV contrast  Discussed with patient  She reports that over 20 years ago she had an MRI and felt a bit of tingling in the mouth after the scan, but no difficulty swallowing, no swelling, no hives  Did not require medications  Had a shared decision making discussion with patient in regards    [phone] Discussed case with Dr Talha Rowell, neurology  Patient not tpa candidate due to improving symptoms  Permissive HTN  Admit for stroke pathway  1754 Discussed abnormal EKG with Dr Lincoln Pulido, cardiology  Recommends troponin trend, echo, admission on telemetry               HEART Risk Score    Flowsheet Row Most Recent Value   Heart Score Risk Calculator    History 1 Filed at: 05/24/2023 1759   ECG 2 Filed at: 05/24/2023 1759   Age 0 Filed at: 05/24/2023 1759   Risk Factors 1 Filed at: 05/24/2023 1759   Troponin 0 Filed at: 05/24/2023 1759   HEART Score 4 Filed at: 05/24/2023 1759           Stroke Assessment     Row Name 05/24/23 1630             NIH Stroke Scale    Interval Baseline      Level of Consciousness (1a ) 0      LOC Questions (1b ) 0      LOC Commands (1c ) 0      Best Gaze (2 ) 0      Visual (3 ) 0      Facial Palsy (4 ) 0      Motor Arm, Left (5a ) 1      Motor Arm, Right (5b ) 0      Motor Leg, Left (6a ) 0      Motor Leg, Right (6b ) 0      Limb Ataxia (7 ) 0      Sensory (8 ) 1      Best Language (9 ) 1      Dysarthria (10 ) 0      Extinction and Inattention (11 ) (Formerly Neglect) 0      Total 3              Flowsheet Row Most Recent Value   Thrombolytic Decision Options    Thrombolytic Decision Patient not a candidate  Patient is not a candidate options Symptoms resolved/clearly non disabling  Medical Decision Making  Assessment and Plan:   Stroke alert called  Patient describes improving symptoms as time is passing  Current NIH score of 3  As patient also has chest pressure, will get CTA dissection study to rule out dissection  Labs to assess for electrolyte abnormalities, check kidney and liver function as well as for anemia and leukocytosis; chest x-ray to rule out widened mediastinum; EKG/troponin to evaluate for arrhythmia/ischemia  Review of medical records from April 2022 shows that the patient has a past medical history significant for stational diabetes and hypertension with severe preeclampsia postpartum; migraines, bicuspid aortic valve; history of right upper extremity DVT    Amount and/or Complexity of Data Reviewed  Labs: ordered  Radiology: ordered  Risk  OTC drugs  Prescription drug management  Decision regarding hospitalization  Disposition  Final diagnoses:   Word finding difficulty   Chest pain   Nonspecific abnormal electrocardiogram (ECG) (EKG)     Time reflects when diagnosis was documented in both MDM as applicable and the Disposition within this note     Time User Action Codes Description Comment    5/24/2023  4:10 PM Alyx Woods, [address] [R47 89] Word finding difficulty     5/24/2023  4:41 PM Amina Even [R07 9] Chest pain     5/24/2023  4:41 PM Jeffrey Amel Add [R94 31] Nonspecific abnormal electrocardiogram (ECG) (EKG)       ED Disposition     ED Disposition   Admit    Condition   Stable    Date/Time   Wed May 24, 2023  4:40 PM    Comment   Case was discussed with hospitalist and the patient's admission status was agreed to be Admission Status: inpatient status to the service of Dr Lesa Bueno              Follow-up Information    None         Patient's Medications Discharge Prescriptions    No medications on file       No discharge procedures on file      PDMP Review     None          ED Provider  Electronically Signed by           Yessy Walsh DO  05/24/23 2014

## 2023-05-24 NOTE — ASSESSMENT & PLAN NOTE
New onset HTN as of 3 weeks ago  Started on HCTZ 25mg and Toprol XL 25mg by [facility] cardiologist  Reports fluctuating BP at home sporadically, sometimes 120/80, sometimes 160/110  Currently on permissive HTN per stroke pathway, restart Toprol-XL and HCTZ once CVA ruled out

## 2023-05-25 ENCOUNTER — APPOINTMENT (INPATIENT)
Dept: NON INVASIVE DIAGNOSTICS | Facility: HOSPITAL | Age: 38
End: 2023-05-25

## 2023-05-25 ENCOUNTER — APPOINTMENT (INPATIENT)
Dept: MRI IMAGING | Facility: HOSPITAL | Age: 38
End: 2023-05-25

## 2023-05-25 PROBLEM — R65.10 SIRS (SYSTEMIC INFLAMMATORY RESPONSE SYNDROME) (HCC): Status: ACTIVE | Noted: 2023-05-25

## 2023-05-25 LAB
ANION GAP SERPL CALCULATED.3IONS-SCNC: 9 MMOL/L (ref 4–13)
AORTIC ROOT: 3 CM
AORTIC VALVE MEAN VELOCITY: 11.5 M/S
APICAL FOUR CHAMBER EJECTION FRACTION: 58 %
AV AREA BY CONTINUOUS VTI: 2 CM2
AV AREA PEAK VELOCITY: 1.8 CM2
AV LVOT MEAN GRADIENT: 2 MMHG
AV LVOT PEAK GRADIENT: 4 MMHG
AV MEAN GRADIENT: 6 MMHG
AV PEAK GRADIENT: 11 MMHG
AV VALVE AREA: 2 CM2
AV VELOCITY RATIO: 0.58
AVA (PLAN): 2.2 CM2
BASOPHILS # BLD AUTO: 0.05 THOUSANDS/ÂΜL (ref 0–0.1)
BASOPHILS NFR BLD AUTO: 1 % (ref 0–1)
BUN SERPL-MCNC: 18 MG/DL (ref 5–25)
CALCIUM SERPL-MCNC: 9 MG/DL (ref 8.4–10.2)
CHLORIDE SERPL-SCNC: 101 MMOL/L (ref 96–108)
CHOLEST SERPL-MCNC: 201 MG/DL
CO2 SERPL-SCNC: 28 MMOL/L (ref 21–32)
CREAT SERPL-MCNC: 0.85 MG/DL (ref 0.6–1.3)
DOP CALC AO PEAK VEL: 1.65 M/S
DOP CALC AO VTI: 31.36 CM
DOP CALC LVOT AREA: 3.14 CM2
DOP CALC LVOT DIAMETER: 2 CM
DOP CALC LVOT PEAK VEL VTI: 19.98 CM
DOP CALC LVOT PEAK VEL: 0.95 M/S
DOP CALC LVOT STROKE INDEX: 32.7 ML/M2
DOP CALC LVOT STROKE VOLUME: 62.74 CM3
DOP CALC MV VTI: 27.66 CM
E WAVE DECELERATION TIME: 207 MS
EOSINOPHIL # BLD AUTO: 0.16 THOUSAND/ÂΜL (ref 0–0.61)
EOSINOPHIL NFR BLD AUTO: 2 % (ref 0–6)
ERYTHROCYTE [DISTWIDTH] IN BLOOD BY AUTOMATED COUNT: 12.5 % (ref 11.6–15.1)
EST. AVERAGE GLUCOSE BLD GHB EST-MCNC: 105 MG/DL
FRACTIONAL SHORTENING: 37 % (ref 28–44)
GFR SERPL CREATININE-BSD FRML MDRD: 87 ML/MIN/1.73SQ M
GLOBAL LONGITUIDAL STRAIN: -20 %
GLUCOSE SERPL-MCNC: 136 MG/DL (ref 65–140)
HBA1C MFR BLD: 5.3 %
HCT VFR BLD AUTO: 36.9 % (ref 34.8–46.1)
HDLC SERPL-MCNC: 45 MG/DL
HGB BLD-MCNC: 12.5 G/DL (ref 11.5–15.4)
IMM GRANULOCYTES # BLD AUTO: 0.04 THOUSAND/UL (ref 0–0.2)
IMM GRANULOCYTES NFR BLD AUTO: 0 % (ref 0–2)
INTERVENTRICULAR SEPTUM IN DIASTOLE (PARASTERNAL SHORT AXIS VIEW): 0.7 CM
INTERVENTRICULAR SEPTUM: 0.7 CM (ref 0.6–1.1)
LAAS-AP2: 18.1 CM2
LAAS-AP4: 13.6 CM2
LDLC SERPL CALC-MCNC: 122 MG/DL (ref 0–100)
LEFT ATRIUM SIZE: 3.4 CM
LEFT INTERNAL DIMENSION IN SYSTOLE: 3.3 CM (ref 2.1–4)
LEFT VENTRICLE DIASTOLIC VOLUME (MOD BIPLANE): 69 ML
LEFT VENTRICLE SYSTOLIC VOLUME (MOD BIPLANE): 29 ML
LEFT VENTRICULAR INTERNAL DIMENSION IN DIASTOLE: 5.2 CM (ref 3.5–6)
LEFT VENTRICULAR POSTERIOR WALL IN END DIASTOLE: 0.7 CM
LEFT VENTRICULAR STROKE VOLUME: 87 ML
LV EF: 58 %
LVSV (TEICH): 87 ML
LYMPHOCYTES # BLD AUTO: 2.98 THOUSANDS/ÂΜL (ref 0.6–4.47)
LYMPHOCYTES NFR BLD AUTO: 33 % (ref 14–44)
MAGNESIUM SERPL-MCNC: 2.1 MG/DL (ref 1.9–2.7)
MCH RBC QN AUTO: 31 PG (ref 26.8–34.3)
MCHC RBC AUTO-ENTMCNC: 33.9 G/DL (ref 31.4–37.4)
MCV RBC AUTO: 92 FL (ref 82–98)
MONOCYTES # BLD AUTO: 0.81 THOUSAND/ÂΜL (ref 0.17–1.22)
MONOCYTES NFR BLD AUTO: 9 % (ref 4–12)
MV E'TISSUE VEL-LAT: 17 CM/S
MV E'TISSUE VEL-SEP: 12 CM/S
MV MEAN GRADIENT: 1 MMHG
MV PEAK A VEL: 0.49 M/S
MV PEAK E VEL: 98 CM/S
MV PEAK GRADIENT: 4 MMHG
MV STENOSIS PRESSURE HALF TIME: 60 MS
MV VALVE AREA BY CONTINUITY EQUATION: 2.27 CM2
MV VALVE AREA P 1/2 METHOD: 3.67 CM2
NEUTROPHILS # BLD AUTO: 5.14 THOUSANDS/ÂΜL (ref 1.85–7.62)
NEUTS SEG NFR BLD AUTO: 55 % (ref 43–75)
NRBC BLD AUTO-RTO: 0 /100 WBCS
PLATELET # BLD AUTO: 255 THOUSANDS/UL (ref 149–390)
PMV BLD AUTO: 10.5 FL (ref 8.9–12.7)
POTASSIUM SERPL-SCNC: 3.3 MMOL/L (ref 3.5–5.3)
RBC # BLD AUTO: 4.03 MILLION/UL (ref 3.81–5.12)
RIGHT ATRIAL 2D VOLUME: 40 ML
RIGHT ATRIUM AREA SYSTOLE A4C: 14.6 CM2
RIGHT VENTRICLE ID DIMENSION: 3.6 CM
SL CV LEFT ATRIUM LENGTH A2C: 4.7 CM
SL CV LV EF: 55
SL CV PED ECHO LEFT VENTRICLE DIASTOLIC VOLUME (MOD BIPLANE) 2D: 131 ML
SL CV PED ECHO LEFT VENTRICLE SYSTOLIC VOLUME (MOD BIPLANE) 2D: 44 ML
SODIUM SERPL-SCNC: 138 MMOL/L (ref 135–147)
TRICUSPID ANNULAR PLANE SYSTOLIC EXCURSION: 2.1 CM
TRIGL SERPL-MCNC: 171 MG/DL
WBC # BLD AUTO: 9.18 THOUSAND/UL (ref 4.31–10.16)

## 2023-05-25 RX ORDER — METOPROLOL SUCCINATE 25 MG/1
25 TABLET, EXTENDED RELEASE ORAL 2 TIMES DAILY
Status: DISCONTINUED | OUTPATIENT
Start: 2023-05-25 | End: 2023-05-26 | Stop reason: HOSPADM

## 2023-05-25 RX ORDER — POTASSIUM CHLORIDE 20 MEQ/1
40 TABLET, EXTENDED RELEASE ORAL ONCE
Status: COMPLETED | OUTPATIENT
Start: 2023-05-25 | End: 2023-05-25

## 2023-05-25 RX ADMIN — METOPROLOL SUCCINATE 25 MG: 25 TABLET, EXTENDED RELEASE ORAL at 10:34

## 2023-05-25 RX ADMIN — METOPROLOL SUCCINATE 25 MG: 25 TABLET, EXTENDED RELEASE ORAL at 20:34

## 2023-05-25 RX ADMIN — POTASSIUM CHLORIDE 40 MEQ: 1500 TABLET, EXTENDED RELEASE ORAL at 10:33

## 2023-05-25 RX ADMIN — CLOPIDOGREL BISULFATE 75 MG: 75 TABLET ORAL at 10:43

## 2023-05-25 RX ADMIN — ASPIRIN 81 MG CHEWABLE TABLET 81 MG: 81 TABLET CHEWABLE at 10:34

## 2023-05-25 RX ADMIN — HYDROCHLOROTHIAZIDE 25 MG: 25 TABLET ORAL at 10:34

## 2023-05-25 NOTE — PROGRESS NOTES
"Samuel Gramajo  Progress Note  Name: Bia Maria I  MRN: [id number]  Unit/Bed#: -01 I Date of Admission: 5/24/2023   Date of Service: 5/25/2023 I Hospital Day: 1    Assessment/Plan   SIRS (systemic inflammatory response syndrome) ([facility] [address] )  Assessment & Plan  SIRS, POA, since resolved, related to TIA vs migraine as evidenced by HR 90-96, RR 23 and WBC 12 08, treated with telemetry and monitoring CBC's  Abnormal ECG  Assessment & Plan  · ECG shows T wave inversions in inferior leads (v2, v3 ,aVF)  Previous ECG shows single lead T wave inversion in v3, but not in v2 or aVF  · Reported some chest pressure and SOB during her near syncopal episode, now resolved  · Also with elevated BP on arrival  · Troponin 2 --> 2, delta 0  · Continue to trend troponin, repeat ECG in AM, STAT ECG for any chest pain/SOB  · Obtain echo per stroke pathway, concern for PFO  Final report pending  · Cardiology consult  Pt would like to switch from [facility] to [facility] cardiology group  Hypertension  Assessment & Plan  New onset HTN as of 3 weeks ago  Started on HCTZ 25mg and Toprol XL 25mg by [facility] cardiologist  Stop HCTZ d/t hypokalemia  Transition to BID toprol XL as instructed by cards  Migraine  Assessment & Plan  Hx of migraines, on PRN sumatriptan  Hold triptans until CVA ruled out  Denied any headaches/migraine prior to her stroke-like episode    * Stroke-like symptoms  Assessment & Plan  Presented due to lightheadedness and difficulty finding word  This was precipitated by an aura described as \" feeling unwell\" or \" woozy\"  Lightheadedness described as though she was going to pass out, denies vertigo/room spinning  · Hypertensive on arrival at 160/110  · At time of my exam, symptoms have fully resolved  No facial asymmetry, no difficulty talking, AAOx3, able to ambulate independently without any ataxia    · CT head, CTA head/neck, CTA dissection protocol all without any acute " intracranial abnormalities  · Evaluated by neurology who recommended admission with stroke pathway:  · Sx may be related to TIA/CVA vs migraine  · Loaded with aspirin/Plavix, continue with ASA 81 mg daily and Plavix 75mg daily  If MRI neg, dc plavix  · Echo - read pending but concern for possible PFO  · MRI brain  · Neurochecks  · Telemetry  · Able to ambulate independently-does not require PT/OT evaluation  · Neurology consult  Appreciate recs              VTE  Prophylaxis:   Pharmacologic: in place  Mechanical VTE Prophylaxis in Place: Yes    Patient Centered Rounds: I have performed bedside rounds with nursing staff today  Discussions with Specialists or Other Care Team Provider: case management    Education and Discussions with Family / Patient: pt spouse      Current Length of Stay: 1 day(s)    Current Patient Status: Inpatient        Code Status: Level 1 - Full Code    Discharge Plan: Pt will require continued inpatient hospitalization  Subjective:   Pt back to neurological baseline     Patient is seen and examined at bedside  All other ROS are negative  Objective:     Vitals:   Temp (24hrs), Av 1 °F (36 7 °C), Min:97 9 °F (36 6 °C), Max:98 2 °F (36 8 °C)    Temp:  [97 9 °F (36 6 °C)-98 2 °F (36 8 °C)] 98 2 °F (36 8 °C)  HR:  [71-96] 80  Resp:  [16-23] 18  BP: (110-168)/() 137/84  SpO2:  [92 %-100 %] 92 %  Body mass index is 33 95 kg/m²  Input and Output Summary (last 24 hours):        Intake/Output Summary (Last 24 hours) at 2023 1438  Last data filed at 2023 0929  Gross per 24 hour   Intake 600 ml   Output --   Net 600 ml       Physical Exam:       GEN: No acute distress, comfortable  HEEENT: No JVD, PERRLA, no scleral icterus  RESP: Lungs clear to auscultation bilaterally  CV: RRR, +s1/s2   ABD: SOFT NON TENDER, POSITIVE BOWEL SOUNDS, NO DISTENTION  PSYCH: CALM  NEURO: Mentation baseline, NO FOCAL DEFICITS  SKIN: NO RASH  EXTREM: NO EDEMA    Additional Data: Labs:    Results from last 7 days   Lab Units 05/25/23  0538   EOS PCT % 2   HEMATOCRIT % 36 9   HEMOGLOBIN g/dL 12 5   LYMPHS PCT % 33   MONOS PCT % 9   NEUTROS PCT % 55   PLATELETS Thousands/uL 255   WBC Thousand/uL 9 18     Results from last 7 days   Lab Units 05/25/23  0538 05/24/23  1546   ANION GAP mmol/L 9 18*   ALBUMIN g/dL  --  4 7   ALK PHOS U/L  --  103   ALT U/L  --  36   AST U/L  --  58*   BUN mg/dL 18 17   CALCIUM mg/dL 9 0 10 1   CHLORIDE mmol/L 101 93*   CO2 mmol/L 28 23   CREATININE mg/dL 0 85 0 76   GLUCOSE RANDOM mg/dL 136 101   POTASSIUM mmol/L 3 3* 4 4   SODIUM mmol/L 138 134*   TOTAL BILIRUBIN mg/dL  --  1 07*     Results from last 7 days   Lab Units 05/24/23  1546   INR  1 02     Results from last 7 days   Lab Units 05/24/23  1618   POC GLUCOSE mg/dl 83     Results from last 7 days   Lab Units 05/25/23  0538   HEMOGLOBIN A1C % 5 3           Lines/Drains:  Invasive Devices     Peripheral Intravenous Line  Duration           Peripheral IV 05/24/23 Right Antecubital <1 day                Telemetry:   Telemetry Orders (From admission, onward)             24 Hour Telemetry Monitoring  (ED Bridging Orders Panel)  Continuous x 24 Hours (Telem)        Expiring   Question:  Reason for 24 Hour Telemetry  Answer:  PCI/EP study (including pacer and ICD implementation), Cardiac surgery, MI, abnormal cardiac cath, and chest pain- rule out MI                    * I Have Reviewed All Lab Data Listed Above  Imaging:     Results for orders placed during the hospital encounter of 03/25/22    XR chest portable    Narrative  CHEST    INDICATION:   fever  COMPARISON:  12/13/2021    EXAM PERFORMED/VIEWS:  XR CHEST PORTABLE      FINDINGS:    Cardiomediastinal silhouette appears unremarkable  The lungs are clear  No pneumothorax or pleural effusion  Osseous structures appear within normal limits for patient age  Impression  No acute cardiopulmonary disease      Findings concur with the preliminary report by the referring clinician already in PACS and/or our electronic record EPIC  Workstation performed: [id number]    Results for orders placed during the hospital encounter of 05/24/23    XR chest 2 views    Narrative  CHEST    INDICATION:   dizzy, sob  COMPARISON: 3/25/2022    EXAM PERFORMED/VIEWS:  XR CHEST PA & LATERAL      FINDINGS:    Cardiomediastinal silhouette appears unremarkable  The lungs are clear  No pneumothorax or pleural effusion  Osseous structures appear within normal limits for patient age  Impression  No acute cardiopulmonary disease  Workstation performed: [id number]      *I have reviewed all imaging reports listed above      Recent Cultures (last 7 days):           Last 24 Hours Medication List:   Current Facility-Administered Medications   Medication Dose Route Frequency Provider Last Rate   • aspirin  81 mg Oral Daily Roland Bolton PA-C     • clopidogrel  75 mg Oral Daily Roland Bolton PA-C     • metoprolol succinate  25 mg Oral BID Ever Steele PA-C          Today, Patient Was Seen By: Shin Palmer MD    ** Please Note: Dictation voice to text software may have been used in the creation of this document   **

## 2023-05-25 NOTE — ASSESSMENT & PLAN NOTE
New onset HTN as of 3 weeks ago  Started on HCTZ 25mg and Toprol XL 25mg by [facility] cardiologist  Stop HCTZ d/t hypokalemia  Transition to BID toprol XL as instructed by cards

## 2023-05-25 NOTE — UTILIZATION REVIEW
Initial Clinical Review    Admission: Date/Time/Statement:   Admission Orders (From admission, onward)     Ordered        05/24/23 1747  INPATIENT ADMISSION  Once                      Orders Placed This Encounter   Procedures   • INPATIENT ADMISSION     Standing Status:   Standing     Number of Occurrences:   1     Order Specific Question:   Level of Care     Answer:   Med Surg [16]     Order Specific Question:   Estimated length of stay     Answer:   More than 2 Midnights     Order Specific Question:   Certification     Answer:   I certify that inpatient services are medically necessary for this patient for a duration of greater than two midnights  See H&P and MD Progress Notes for additional information about the patient's course of treatment  ED Arrival Information     Expected   -    Arrival   5/24/2023 15:35    Acuity   Urgent            Means of arrival   Ambulance    Escorted by   [facility]    Service   Hospitalist    Admission type   Emergency            Arrival complaint   Hypertension, sob, syncope           Chief Complaint   Patient presents with   • Dizziness     Dizziness, htn, generalized weakness  Chest tightness and SOB  Initial Presentation: 44 y o  female from home to ED via ems admitted inpatient due to stroke like symptoms/abnormal ecg  PMH of hpt, diagnosed 3 weeks ago, migraine  Presented due to lightheadedness, heaviness over entire body, tingling over the left side of face and arm, troubling speaking/ word finding, and pressure over the left side of the head and pressure behind the left eye on day of arrival, for last 3 days chest pressure  Recently started HCTZ and metoprolol for hpt  On exam dysarthria, focused on finding right words, LUE slightly weaker then RUE  Subjectively describes tingling sensation to the LUE, but objectively feels both UE and LEs   Anion gap 18  AST 58  Wbc 12 08  Ct abdomen showed hepatic steatosis  Ecg possible ischemia    In the ED given asa and Plavix  Plan is consult neurology, continue asa and Plavix, telemetry, neuro checks, echo, MRI brain  Consult Cardiology  Trend troponin  Permissive hypertension  restart Toprol-XL and HCTZ once CVA ruled out    5/24/23 per neurology - patient is stroke alert and due to improving symptoms, no tpa  Differential is cardiac etiology and secondary TIA vs migraine related pathology and will treat as TIA  Plan is load with asa and Plavix  Start atorvastatin, continue asa and Plavix  Permissive hypertension  Telemetry,  Echo  Neuro checks  Date: 5/25/23    Day 2:  Has 1/10 headache at around her left periorbital region and some chest tightness  Overnight 5 best run of NSVT  Exam non focal   Continue with Aspirin 81 mg and Plavix 75 mg daily  IF MRI brain negative then dc Plavix and continue asa  Continue atorvastatin  Permissive hpt, as needed labetalol SBP > 200  Telemetry  Frequent neuro checks  5/25/23 per Cardiology - patient with loss of consciousness with unclear etiology, and differential is hypotension/vagal episode  With PFO, continue antiplatelets  Increase beta blockers with NSVT        ED Triage Vitals   Temperature Pulse Respirations Blood Pressure SpO2   05/24/23 1541 05/24/23 1540 05/24/23 1540 05/24/23 1540 05/24/23 1540   98 2 °F (36 8 °C) 96 20 (!) 161/106 97 %      Temp Source Heart Rate Source Patient Position - Orthostatic VS BP Location FiO2 (%)   05/24/23 1541 05/24/23 1540 05/24/23 1540 05/24/23 1540 --   Oral Monitor Sitting Right arm       Pain Score       05/24/23 1540       No Pain          Wt Readings from Last 1 Encounters:   05/25/23 92 5 kg (204 lb)     Additional Vital Signs:   05/25/23 0929 98 2 °F (36 8 °C) 80 18 137/84 -- 92 % None (Room air) Sitting   05/25/23 0538 -- 72 16 113/71 85 94 % None (Room air) Lying   05/25/23 0331 -- 79 17 117/72 87 95 % None (Room air) Lying   05/25/23 01:24:19 98 °F (36 7 °C) 82 16 111/71 84 94 % None (Room air) Lying   05/24/23 2333 -- 79 16 110/64 79 95 % None (Room air) Lying   05/24/23 2229 -- 82 16 127/85 99 94 % -- Sitting   05/24/23 21:17:19 -- -- -- 129/87 101 -- -- --   05/24/23 20:13:33 97 9 °F (36 6 °C) 88 16 137/85 102 98 % None (Room air) Sitting   05/24/23 1800 -- 74 20 132/88 106 98 % -- --   05/24/23 1700 -- 80 20 143/91 -- 98 % None (Room air) Sitting   05/24/23 1605 -- 92 20 168/107 Abnormal  -- 99 % None (Room air)      Pertinent Labs/Diagnostic Test Results:   MRI brain wo contrast   Final Result by ROSA Nunez MD (05/25 1605)      No intracranial pathology  Workstation performed: [id number]         CT stroke alert brain   Final Result by Neha Harding MD (05/24 1706)      No evidence of acute vascular territorial infarction, intracranial hemorrhage or mass  Findings were directly discussed with Chantel Ramirez  at  4:40 PM         Workstation performed: [id number]         CTA stroke alert (head/neck)   Final Result by Neha Harding MD (05/24 1702)      No stenosis, dissection or occlusion of the carotid or vertebral arteries or major vessels of the [place]  Findings were directly discussed with Chantel Ramirez  at   5:00 PM                             Workstation performed: [id number]         CTA dissection protocol chest/abdomen/pelvis   Final Result by Nhea Harding MD (05/24 9598)         1  No evidence of thoracic or abdominal aortic aneurysm or dissection  2  No evidence of acute cardiopulmonary process  3  Hepatic steatosis  Workstation performed: [id number]         XR chest 2 views   Final Result by Vasiliy Barrett MD (05/24 1704)      No acute cardiopulmonary disease                    Workstation performed: [id number]           5/24/23 ecg Normal sinus rhythm  ST & T wave abnormality, consider inferior ischemia  ST & T wave abnormality, consider anterior ischemia  Prolonged QT  Abnormal ECG  When compared with ECG of 25-MAR-2022 23:21,  Non-specific change in ST segment in Lateral leads  T wave inversion more evident in Inferior leads  T wave inversion now evident in Anterior leads  Significant changes have occurred    5/24/23 ecg Normal sinus rhythm  ST & T wave abnormality, consider inferior ischemia  ST & T wave abnormality, consider anterolateral ischemia  Prolonged QT  Abnormal ECG  When compared with ECG of 24-MAY-2023 15:47, (unconfirmed)  No significant change was found    5/25/23 echo with strain Strain was performed to quantify interventricular dyssynchrony and evaluate components of myocardial function due to chest pressure and tightness  Results from the utilization of Strain Analysis are listed in the report below  Left Ventricle: Left ventricular cavity size is normal  Wall thickness is normal  The left ventricular ejection fraction is 55%  Systolic function is normal  Global longitudinal strain is normal at -20%  Wall motion is normal  Diastolic function is normal   •  Right Ventricle: Right ventricular cavity size is normal  Systolic function is normal   •  Atrial Septum: There is a small and patent foramen ovale confirmed with provocation (cough) with predominant right to left shunting using saline contrast   •  Aortic Valve: There is mild regurgitation  •  Mitral Valve: There is mild regurgitation      Results from last 7 days   Lab Units 05/24/23  1619   SARS-COV-2  Negative     Results from last 7 days   Lab Units 05/25/23  0538 05/24/23  1546   HEMATOCRIT % 36 9 42 2   HEMOGLOBIN g/dL 12 5 14 7   NEUTROS ABS Thousands/µL 5 14 7 83*   PLATELETS Thousands/uL 255 301   WBC Thousand/uL 9 18 12 08*     Results from last 7 days   Lab Units 05/25/23  0538 05/24/23  1546   ANION GAP mmol/L 9 18*   BUN mg/dL 18 17   CALCIUM mg/dL 9 0 10 1   CHLORIDE mmol/L 101 93*   CO2 mmol/L 28 23   CREATININE mg/dL 0 85 0 76   EGFR ml/min/1 73sq m 87 99   POTASSIUM mmol/L 3 3* 4 4   MAGNESIUM mg/dL 2 1  --    SODIUM mmol/L 138 134*     Results from last 7 days   Lab Units 05/24/23  1546   ALBUMIN g/dL 4 7   ALK PHOS U/L 103   ALT U/L 36   AST U/L 58*   TOTAL BILIRUBIN mg/dL 1 07*   TOTAL PROTEIN g/dL 8 5*     Results from last 7 days   Lab Units 05/24/23  1618   POC GLUCOSE mg/dl 83     Results from last 7 days   Lab Units 05/25/23  0538 05/24/23  1546   GLUCOSE RANDOM mg/dL 136 101     Results from last 7 days   Lab Units 05/24/23  2034 05/24/23  1746 05/24/23  1546   HS TNI 0HR ng/L  --   --  2   HS TNI 2HR ng/L  --  2  --    HS TNI 4HR ng/L 2  --   --    HSTNI D2 ng/L  --  0  --    HSTNI D4 ng/L 0  --   --      Results from last 7 days   Lab Units 05/24/23  1546   INR  1 02   PROTIME seconds 14 1   PTT seconds 30     Results from last 7 days   Lab Units 05/24/23  1546   TSH 3RD GENERATON uIU/mL 3 385     Results from last 7 days   Lab Units 05/24/23  1619   INFLUENZA A PCR  Negative   INFLUENZA B PCR  Negative   RSV PCR  Negative       ED Treatment:   Medication Administration from 05/24/2023 1530 to 05/24/2023 1937       Date/Time Order Dose Route Action Comments     05/24/2023 1742 EDT aspirin tablet 325 mg 325 mg Oral Given --     05/24/2023 1741 EDT clopidogrel (PLAVIX) tablet 300 mg 300 mg Oral Given --        Past Medical History:   Diagnosis Date   • Acid reflux    • Bicuspid aortic valve    • COVID-19 affecting pregnancy in second trimester 12/12/2021   • COVID-19 affecting pregnancy in third trimester 1/20/2022    Hospitalized 12/12-12/19/2021 with Covid pneumonia   • Depression 2004    in 2004 after termination of pregnancy  was tx with zoloft    • Diabetes mellitus ([facility] [address] )     prediabetic   • Gestational hypertension 2011    third trimester with her 1st baby   del @ 38 weeks   • Gestational hypertension, third trimester 1/27/2022    First pregnancy, IOL for gestational HTN 9/16/2021 baseline LFTs and creatinine normal 10/6/2021 24hr urine collection 147mg  Patient taking ASA 162mg daily - stopped ASA 3/14/2022  Seen L&D 3/12/2022 - one elevated BP, others normal - labs  Normal   D/c home w/ plan for f/u in office 3/15/2022  • High-risk pregnancy    • HX DVT (deep venous thrombosis) (HCC)     in right axilla -- complication from thoracic outlet syndrome   • Hypertension in pregnancy, preeclampsia, severe, delivered/postpartum 4/6/2022    Patient with gestational HTN in pregnancy and induced for same at 37 weeks  Labs were normal and she continued to have mildly elevated BP after delivery  PPD#16 she presented with blood pressures 150/100 and a headache for 4 days  She does have h/o migraines  Admitted for severe preeclampsia and started on magnesium  • PCOS (polycystic ovarian syndrome) 2010    initally diagnosed in 2010 tx with metformin  pt d/c med and then restarted in 2016   • Pituitary microadenoma ([facility] [address] ) 2009    last MRI in 2009   • Prediabetes 2016    'impaired fasting glucose'    • Thoracic outlet syndrome 2004    in 2004 per [facility] records; has surgical repair     Present on Admission:  • Migraine      Admitting Diagnosis: Nonspecific abnormal electrocardiogram (ECG) (EKG) [R94 31]  Dizziness [R42]  Chest pain [R07 9]  Abnormal ECG [R94 31]  Word finding difficulty [R47 89]  Age/Sex: 44 y o  female  Admission Orders:  Scheduled Medications:  aspirin, 81 mg, Oral, Daily  atorvastatin, 40 mg, Oral, QPM  clopidogrel, 75 mg, Oral, Daily  hydrochlorothiazide, 25 mg, Oral, Daily  metoprolol succinate, 25 mg, Oral, Daily    potassium chloride (K-DUR,KLOR-CON) CR tablet 40 mEq  Dose: 40 mEq  Freq:  Once Route: PO  Start: 05/25/23 1015 End: 05/25/23 1033    metoprolol succinate (TOPROL-XL) 24 hr tablet 25 mg  Dose: 25 mg  Freq: 2 times daily Route: PO  Start: 05/25/23 2100    Continuous IV Infusions:     PRN Meds: not used       Telemetry   Neuro checks Every 1 hour x 4 hours, then every 2 hours x 8 hours, then every 4 hours x 72 hours    IP CONSULT TO NEUROLOGY  IP CONSULT TO CARDIOLOGY    Network Utilization Review Department  ATTENTION: Please call with any questions or concerns to [phone] and carefully listen to the prompts so that you are directed to the right person  All voicemails are confidential   Cooper Rodriguez all requests for admission clinical reviews, approved or denied determinations and any other requests to dedicated fax number below belonging to the campus where the patient is receiving treatment   List of dedicated fax numbers for the Facilities:  [address] DENIALS (Administrative/Medical Necessity) [phone]   [address] (Maternity/NICU/Pediatrics) [phone]   [address] [phone]   [facility] [address] [phone]   [address] [address] [address] [phone]   15[address] [facility] [address] Renu [facility] [address] [phone]

## 2023-05-25 NOTE — CONSULTS
Consult - Cardiology   Sachin Thomson 44 y o  female MRN: [id number]  Unit/Bed#: -01 Encounter: [id number]    Reason For Consult: Chest tightness/abnormal EKG  Outpatient Cardiologist: Dr Bee JAFFE [facility] cardiology)             ASSESSMENT:  1  Stroke-like symptoms  a  Etiology unclear, possible TIA vs migraine  b  CT brain and CTA head and neck negative for abnormalities  c  MRI brain, TTE and A1c pending to complete workup  d  Neurology following; initiated on DAPT with ASA/Plavix with loading doses and atorvastatin 40 mg daily  2  Chest tightness/palpitations  a  Started Sunday, feels tight in the chest to shoulders, intermittent 4-5/10 tightness feels she needs to breath deep to get a breath  Denies exertional worsening   b  HS troponin trend: 2/2/2  c  EKG on admission SR with diffuse T wave changes  d  CT dissection protocol: No evidence of thoracic or abdominal aortic aneurysm or dissection  e  TTE ordered and pending  3  Bicuspid aortic valve  a  As noted on TTE 10/2021, functionally bicuspid AV with with fusion of RCC and LCC, mild to moderate AR  4  NSVT  a  5-beat run noted overnight-- reported on palpitations at that time  b  On Toprol XL 25mg  c  BMP shows K this AM- 3 3  5  Hypertension  a  BP on admission 161/106 > last reading 113/71  b  Home regimen: HCTZ 25 mg, Lopressor 25 mg  6  Hyperlipidemia  a  FLP 5/25/2023: , , HDL 45,   b  Started atorvastatin 40 mg daily during this admission  7  Thoracic outlet syndrome  8  BMI 33 95    PLAN/ DISCUSSION:     • EKG shows nonspecific T-wave changes; serial troponin wnl x3, ACS ruled out at this point  • TTE shows overall preserved EF, possible small PFO  • Telemetry reviewed; patient had 5-beat run of NSVT noted on telemetry overnight was symptomatic at that time with palpitations    • BMP reviewed showing slight hypokalemia; will replenish for goal K above 4  • Discontinue HCTZ  • Increase Toprol XL to 25mg BID  • Continue telemetry monitoring overnight  • Repeat BMP tomorrow AM to assess serum lytes  • Plan to discharge with ZIO event monitor on discharge   • Consider outpatient ischemic evaluation   • We will arrange for close outpatient follow-up in our office in 2-3 weeks post discharge    DIAGNOSTIC DATA:      ECG:                         Telemetry:       Echocardiogram (per care everywhere) 10/8/2021: EF 50-55%, no regional wall motion abnormalities, normal RV size and systolic function, AV is functionally bicuspid with fusion of the RCC and LCC, mild to moderate AR, trace TR, normal PASP 23 mmHg  History of Present Illness:  Oscar Panda is a 44y o  year old female with PMH as above who presents to [facility] with CVA like symptoms with visual field disturbances and paresthesias  Nuerology was consulted; evaluated with CT head and CTA head which was unremarkable for CVA thus far  MRI brain is pending today  On arrival she was discovered with diffuse T wave changes with notably high BPs in the 415 systolic  Hs troponins have been WNL since admission  She follows with Cardiologist at [facility] for about 10 years for serial monitoring of her bicuspid AV but has been stable cardiac wise per her account up until 3 weeks ago where she had been having intermittent HA, checked her BPs which have been more elevated in the 378-320J systolic  She called her OP cardiologist at [facility] and was started on metoprolol and HCTZ  Prior to this she was not taking any medications  PMH: Hx of post-ecclampsia, started on BP medications for 1 month, then discontinued due to low BP readings  Thoracic outlet syndrome with rib removal surgery in 2004  No hx of HTN, HLD,CVA, PE/DVT  FHx: Father with AF     SHx: Never smoker, occasional Etoh, or illicit drugs  Works at [employer] that her family [employer]      Past Medical History:        Past Medical History:   Diagnosis Date   • Acid reflux    • Bicuspid aortic valve    • COVID-19 affecting pregnancy in second trimester 12/12/2021   • COVID-19 affecting pregnancy in third trimester 1/20/2022    Hospitalized 12/12-12/19/2021 with Covid pneumonia   • Depression 2004    in 2004 after termination of pregnancy  was tx with zoloft    • Diabetes mellitus ([facility] [address] )     prediabetic   • Gestational hypertension 2011    third trimester with her 1st baby  del @ 38 weeks   • Gestational hypertension, third trimester 1/27/2022    First pregnancy, IOL for gestational HTN 9/16/2021 baseline LFTs and creatinine normal 10/6/2021 24hr urine collection 147mg  Patient taking ASA 162mg daily - stopped ASA 3/14/2022  Seen L&D 3/12/2022 - one elevated BP, others normal - labs  Normal   D/c home w/ plan for f/u in office 3/15/2022  • High-risk pregnancy    • HX DVT (deep venous thrombosis) (HCC)     in right axilla -- complication from thoracic outlet syndrome   • Hypertension in pregnancy, preeclampsia, severe, delivered/postpartum 4/6/2022    Patient with gestational HTN in pregnancy and induced for same at 37 weeks  Labs were normal and she continued to have mildly elevated BP after delivery  PPD#16 she presented with blood pressures 150/100 and a headache for 4 days  She does have h/o migraines  Admitted for severe preeclampsia and started on magnesium  • PCOS (polycystic ovarian syndrome) 2010    initally diagnosed in 2010 tx with metformin   pt d/c med and then restarted in 2016   • Pituitary microadenoma ([facility] [address] ) 2009    last MRI in 2009   • Prediabetes 2016    'impaired fasting glucose'    • Thoracic outlet syndrome 2004    in 2004 per [facility]; has surgical repair      Past Surgical History:   Procedure Laterality Date   • BONE RESECTION, RIB Right 2004   • WISDOM TOOTH EXTRACTION Bilateral         Allergy:        Allergies   Allergen Reactions   • Gadobutrol Cough and Itching   • Nuts - Food Allergy Anaphylaxis   • Shellfish-Derived Products - Food Allergy Anaphylaxis   • Shrimp Extract Allergy Skin Test - Food Allergy Headache   • Venlafaxine Anaphylaxis     Per [facility] records   • Gadolinium Derivatives Other (See Comments)     Pt sts allergy to a contrast in MRI years ago per pt (originally documented as a CT contrast- pt had CT contrast 5/24/23 without incident)   • Hydromorphone Itching     During labor had reaction   • Morphine Itching   • Peanut Oil - Food Allergy Other (See Comments)     Itching, trouble breathing       Medications:       Prior to Admission medications    Medication Sig Start Date End Date Taking? Authorizing Provider   hydrochlorothiazide (HYDRODIURIL) 25 mg tablet Take 25 mg by mouth daily   Yes Historical Provider, MD   loratadine (CLARITIN) 10 mg tablet Take 20 mg by mouth daily   Yes Historical Provider, MD   metoprolol tartrate (LOPRESSOR) 25 mg tablet Take 25 mg by mouth in the morning   Yes Historical Provider, MD   norethindrone (Ortho Micronor) 0 35 MG tablet Take 1 tablet (0 35 mg total) by mouth daily 8/9/22  Yes Filomena Sharpe MD   omeprazole (PriLOSEC) 20 mg delayed release capsule Take 20 mg by mouth 1/1/21  Yes Historical Provider, MD   EPINEPHrine (EPIPEN) 0 3 mg/0 3 mL SOAJ Inject 0 3 mg into a muscle every 8 (eight) hours as needed for anaphylaxis    Historical Provider, MD       Family History:     History reviewed  No pertinent family history       Social History:       Social History     Socioeconomic History   • Marital status: Registered Domestic Partner     Spouse name: None   • Number of children: None   • Years of education: None   • Highest education level: None   Occupational History   • None   Tobacco Use   • Smoking status: Never   • Smokeless tobacco: Never   Vaping Use   • Vaping Use: Never used   Substance and Sexual Activity   • Alcohol use: Yes     Comment: social   • Drug use: Never   • Sexual activity: Yes     Partners: Male     Birth control/protection: OCP   Other Topics Concern   • None   Social History Narrative   • None     Social Determinants of Health     Financial Resource Strain: Not on file   Food Insecurity: Not on file   Transportation Needs: Not on file   Physical Activity: Not on file   Stress: Not on file   Social Connections: Not on file   Intimate Partner Violence: Not on file   Housing Stability: Not on file       Weights/BMI:    Wt Readings from Last 3 Encounters:   05/24/23 92 5 kg (204 lb)   05/24/23 92 5 kg (204 lb)   09/13/22 91 7 kg (202 lb 3 2 oz)   , Body mass index is 33 95 kg/m²  ROS:  14 point ROS negative except as outlined above  Remainder review of systems is negative    Exam:  General: Alert, oriented and in no acute distress, cooperative  Head: Normocephalic, atraumatic  Eyes: PERRLA  No icterus  Normal Conjunctiva  Oropharynx: Moist and normal-appearing mucosa  Neck: Supple, symmetrical, trachea midline, JVD not appreciated     Heart: RRR, no murmur, rub or gallop, S1 & S2 normal   Lungs: Normal air entry, lungs clear to auscultation and no rales, rhonchi or wheezing   Abdomen: Flat, normal findings: bowel sounds normal and soft, non-tender  Lower Limbs:  No pitting edema, 2+ peripheral pulses, capillary refill within normal limits  Musculoskeletal: ROM grossly normal

## 2023-05-25 NOTE — PROGRESS NOTES
Progress Note - Neurology   Gearl Loss Berto 44 y o  female [id number]  Unit/Bed#: /-01    Assessment/Plan:  * Stroke-like symptoms  Assessment & Plan  44year old female with migraines, thoracic outlet syndrome s/p surgery (2004), HTN, and prediabetes who presents as a stroke alert on 5/24  Patient reports chest pressure x3 days, as well as generalized paresthesias and dizzy sensation that began on 5/24 at 2:30 PM while she was sitting down  Patient reports feeling at baseline prior to paresthesias/dizziness  Patient also had an associated mild migrainous left-sided headache (patient reports history of migraines that are right-sided with no neurological deficits)  NIH 0 on neurology arrival (previously 3 with mild left-sided weakness)  's  Patient was deemed not a TNK candidate secondary to rapidly improving symptoms  Workup:  - CTH/CTA head/neck: Negative for acute intracranial abnormality, aneurysm, dissection or significant stenosis  - CTA dissection protocol unremarkable  - MRI brain wo pending  - Echo pending  - Lipid Panel: Cholesterol 201,   - Hemoglobin A1c pending    Etiology of presenting symptoms concerning for TIA vs hypertensive encephalopathy vs migraine     Plan:  - Stroke pathway  - Loaded with Aspirin 325 mg and Plavix 300 mg   Continue with Aspirin 81 mg and Plavix 75 mg daily   - If MRI brain negative for acute ischemia, recommend continuing Aspirin 81 mg monotherapy and discontinue Plavix  - Continue with Atorvastatin 40 mg   - Ok to discontinue if MRI brain negative  - Permissive HTN, labetalol if SBP >200   - Euglycemic, normothermic goal  - Continue telemetry  - PT/OT/ST  - Secondary risk factor modification   - Stroke education  - Frequent neuro checks   Continue to monitor and notify neurology with any changes   - STAT CT head for any acute change in neuro exam  - Medical management and supportive care per primary team  Correction of any metabolic or infectious disturbances  - Cardiology work up ongoing    Plan discussed with Attending Neurologist, please see attestation for further input/recommendations  Asha Thomson will need follow up in in 6 weeks with general attending or advance practitioner  She will not require outpatient neurological testing  Subjective:   Patient seen resting comfortably at bedside  She reports feeling better today, she still has a 1/10 headache around her left periorbital region and some chest tightness however aside from that feels back to baseline  She reported cardiology was in her room while she was getting her echocardiogram completed and relayed her chest tightness symptoms to the cardiologist   She was made aware that her MRI would be done later today  All questions/concerns answered  Past Medical History:   Diagnosis Date   • Acid reflux    • Bicuspid aortic valve    • COVID-19 affecting pregnancy in second trimester 12/12/2021   • COVID-19 affecting pregnancy in third trimester 1/20/2022    Hospitalized 12/12-12/19/2021 with Covid pneumonia   • Depression 2004    in 2004 after termination of pregnancy  was tx with zoloft    • Diabetes mellitus ([facility] [address] )     prediabetic   • Gestational hypertension 2011    third trimester with her 1st baby  del @ 38 weeks   • Gestational hypertension, third trimester 1/27/2022    First pregnancy, IOL for gestational HTN 9/16/2021 baseline LFTs and creatinine normal 10/6/2021 24hr urine collection 147mg  Patient taking ASA 162mg daily - stopped ASA 3/14/2022  Seen L&D 3/12/2022 - one elevated BP, others normal - labs  Normal   D/c home w/ plan for f/u in office 3/15/2022     • High-risk pregnancy    • HX DVT (deep venous thrombosis) (HCC)     in right axilla -- complication from thoracic outlet syndrome   • Hypertension in pregnancy, preeclampsia, severe, delivered/postpartum 4/6/2022    Patient with gestational HTN in pregnancy and induced for same at 40 weeks  Labs were normal and she continued to have mildly elevated BP after delivery  PPD#16 she presented with blood pressures 150/100 and a headache for 4 days  She does have h/o migraines  Admitted for severe preeclampsia and started on magnesium  • PCOS (polycystic ovarian syndrome) 2010    initally diagnosed in 2010 tx with metformin  pt d/c med and then restarted in 2016   • Pituitary microadenoma [facility]) 2009    last MRI in 2009   • Prediabetes 2016    'impaired fasting glucose'    • Thoracic outlet syndrome 2004    in 2004 per [facility] records; has surgical repair     Past Surgical History:   Procedure Laterality Date   • BONE RESECTION, RIB Right 2004   • WISDOM TOOTH EXTRACTION Bilateral      History reviewed  No pertinent family history  Social History     Socioeconomic History   • Marital status: Registered Domestic Partner     Spouse name: None   • Number of children: None   • Years of education: None   • Highest education level: None   Occupational History   • None   Tobacco Use   • Smoking status: Never   • Smokeless tobacco: Never   Vaping Use   • Vaping Use: Never used   Substance and Sexual Activity   • Alcohol use: Yes     Comment: social   • Drug use: Never   • Sexual activity: Yes     Partners: Male     Birth control/protection: OCP   Other Topics Concern   • None   Social History Narrative   • None     Social Determinants of Health     Financial Resource Strain: Not on file   Food Insecurity: Not on file   Transportation Needs: Not on file   Physical Activity: Not on file   Stress: Not on file   Social Connections: Not on file   Intimate Partner Violence: Not on file   Housing Stability: Not on file       Medications: Reviewed in detail by me  Performed by Attending Neurologist, AP present at bedside   ROS: Review of Systems   Constitutional: Negative for fatigue and fever  Eyes: Negative for photophobia and visual disturbance     Respiratory: Negative for cough and shortness of "breath  Cardiovascular: Negative for palpitations  Reports chest tightness   Musculoskeletal: Negative for back pain, gait problem, neck pain and neck stiffness  Skin: Negative for color change and pallor  Neurological: Positive for headaches (1/10)  Negative for dizziness, tremors, seizures, syncope, facial asymmetry, speech difficulty, weakness, light-headedness and numbness  Psychiatric/Behavioral: Negative for confusion  The patient is not nervous/anxious  All other systems reviewed and are negative  Vitals: /84 (BP Location: Left arm)   Pulse 80   Temp 98 2 °F (36 8 °C) (Oral)   Resp 18   Ht 5' 5\" (1 651 m)   Wt 92 5 kg (204 lb)   SpO2 92%   BMI 33 95 kg/m²     Performed by Attending Neurologist, AP present at bedside   Physical Exam:   Physical Exam  Vitals reviewed  Constitutional:       General: She is not in acute distress  Appearance: Normal appearance  She is normal weight  She is not ill-appearing  HENT:      Head: Normocephalic and atraumatic  Right Ear: External ear normal       Left Ear: External ear normal       Nose: Nose normal       Mouth/Throat:      Mouth: Mucous membranes are moist    Eyes:      General:         Right eye: No discharge  Left eye: No discharge  Extraocular Movements: Extraocular movements intact and EOM normal       Conjunctiva/sclera: Conjunctivae normal       Pupils: Pupils are equal, round, and reactive to light  Cardiovascular:      Rate and Rhythm: Normal rate  Pulmonary:      Effort: Pulmonary effort is normal  No respiratory distress  Musculoskeletal:         General: No tenderness  Normal range of motion  Cervical back: Normal range of motion  No rigidity  Right lower leg: No edema  Left lower leg: No edema  Skin:     General: Skin is warm and dry  Coloration: Skin is not jaundiced or pale  Neurological:      Mental Status: She is alert and oriented to person, place, and time   " Mental status is at baseline  Motor: Motor strength is normal      Coordination: Finger-Nose-Finger Test and Heel to Shin Test normal    Psychiatric:         Mood and Affect: Mood normal          Speech: Speech normal          Behavior: Behavior normal        Neurologic Exam     Mental Status   Oriented to person, place, and time  Follows 2 step commands  Attention: normal  Concentration: normal    Speech: speech is normal   Level of consciousness: alert  Knowledge: good  Able to name object  Normal comprehension  Cranial Nerves     CN II   Visual fields full to confrontation  CN III, IV, VI   Pupils are equal, round, and reactive to light  Extraocular motions are normal    Nystagmus: none   Diplopia: none  Conjugate gaze: present    CN V   Facial sensation intact  CN VII   Facial expression full, symmetric  CN VIII   CN VIII normal      CN IX, X   CN IX normal      CN XII   CN XII normal      Motor Exam   Muscle bulk: normal  Overall muscle tone: normal  Right arm pronator drift: absent  Left arm pronator drift: absent    Strength   Strength 5/5 throughout  Sensory Exam   Light touch normal      Gait, Coordination, and Reflexes     Gait  Gait: (deferred during exam, patient reports ambulating independently around room)    Coordination   Finger to nose coordination: normal  Heel to shin coordination: normal    Tremor   Resting tremor: absent  Intention tremor: absent  Action tremor: absent    Reflexes   Reflexes 2+ except as noted  Labs: Reviewed in detail by me  Imaging: I have personally reviewed pertinent imaging and PACS reports  VTE Prophylaxis: Sequential compression device (Venodyne)     Total time spent today 26 minutes  Greater than 50% of total time was spent with the patient and/or family counseling and/or coordination of care  A description of the counseling/coordination of care:  Patient was seen and evaluated  Discussed with attending    Chart reviewed thoroughly including laboratory and imaging studies  Discussed medication regimen, imaging reviewed    Plan of care discussed with patient and primary team

## 2023-05-25 NOTE — PHYSICAL THERAPY NOTE
Physical Therapy Screen    Patient Name: Asha Thomson    [id number] Date: 5/25/2023     Problem List  Principal Problem:    Stroke-like symptoms  Active Problems:    Migraine    Hypertension    Abnormal ECG       Past Medical History  Past Medical History:   Diagnosis Date    Acid reflux     Bicuspid aortic valve     COVID-19 affecting pregnancy in second trimester 12/12/2021    COVID-19 affecting pregnancy in third trimester 1/20/2022    Hospitalized 12/12-12/19/2021 with Covid pneumonia    Depression 2004    in 2004 after termination of pregnancy  was tx with zoloft     Diabetes mellitus ([address] )     prediabetic    Gestational hypertension 2011    third trimester with her 1st baby  del @ 38 weeks    Gestational hypertension, third trimester 1/27/2022    First pregnancy, IOL for gestational HTN 9/16/2021 baseline LFTs and creatinine normal 10/6/2021 24hr urine collection 147mg  Patient taking ASA 162mg daily - stopped ASA 3/14/2022  Seen L&D 3/12/2022 - one elevated BP, others normal - labs  Normal   D/c home w/ plan for f/u in office 3/15/2022  High-risk pregnancy     HX DVT (deep venous thrombosis) (HCC)     in right axilla -- complication from thoracic outlet syndrome    Hypertension in pregnancy, preeclampsia, severe, delivered/postpartum 4/6/2022    Patient with gestational HTN in pregnancy and induced for same at 37 weeks  Labs were normal and she continued to have mildly elevated BP after delivery  PPD#16 she presented with blood pressures 150/100 and a headache for 4 days  She does have h/o migraines  Admitted for severe preeclampsia and started on magnesium  PCOS (polycystic ovarian syndrome) 2010    initally diagnosed in 2010 tx with metformin   pt d/c med and then restarted in 2016    Pituitary microadenoma [facility]) 2009    last MRI in 2009    Prediabetes 2016    'impaired fasting glucose'     Thoracic outlet syndrome 2004    in 2004 per [facility] records; has surgical repair        Past Surgical History  Past Surgical History:   Procedure Laterality Date    BONE RESECTION, RIB Right 2004    WISDOM TOOTH EXTRACTION Bilateral         05/25/23 1129   PT Last Visit   PT Visit Date 05/25/23   Note Type   Note type Screen   Additional Comments Chart review completed  Per chart and SHEILA Delgado patient is independently ambulating and is at her functional baseline  No inpatient P T  needs  D/C P  T  chioma Son

## 2023-05-25 NOTE — CASE MANAGEMENT
Case Management Assessment & Discharge Planning Note    Patient name Rand Hernandez  Location /-58 MRN [id number]  : 1985 Date 2023       Current Admission Date: 2023  Current Admission Diagnosis:Stroke-like symptoms   Patient Active Problem List    Diagnosis Date Noted   • SIRS (systemic inflammatory response syndrome) ([facility] [address] ) 2023   • Stroke-like symptoms 2023   • Hypertension 2023   • Abnormal ECG 2023   • ASCUS with positive high risk HPV cervical 2022   • Migraine 2022   • Postpartum endometritis 2022   • Iron deficiency anemia secondary to inadequate dietary iron intake 2022   • Subclinical hypothyroidism 2022   • Hereditary disease in family possibly affecting fetus 2022   • Personal history of COVID-19 2022   • Syncopal episodes 2021   • Insulin resistance 2021   • History of DVT (deep vein thrombosis) 2021   • Bicuspid aortic valve 2021   • Thoracic outlet syndrome 2021   • Depression 2004      LOS (days): 1  Geometric Mean LOS (GMLOS) (days): 2 30  Days to GMLOS:1 4     OBJECTIVE:    Risk of Unplanned Readmission Score: 7 58         Current admission status: Inpatient       Preferred Pharmacy:   [facility] - [address]   [facility] [address]  Phone: [phone] Fax: [phone]    Primary Care Provider: Suzanne Latham MD    Primary Insurance: BLUE CROSS  Secondary Insurance:     ASSESSMENT:  [id number], [id number] [facility] Other   Primary Phone: [phone] (Mobile)               Advance Directives  Does patient have a [address] [place] [address] Avenue?: No  Was patient offered paperwork?: Yes (provided)  Does patient currently have a Health Care decision maker?: Yes, please see Health Care Proxy section  Does patient have Advance Directives?: No  Was patient offered paperwork?: Yes (provided)    Readmission Root Cause  30 Day Readmission: No    Patient Information  Admitted from[de-identified] Home  Mental Status: Alert  During Assessment patient was accompanied by: Not accompanied during assessment  Assessment information provided by[de-identified] Patient  Primary Caregiver: Self  Support Systems: Family members  Home entry access options   Select all that apply : No steps to enter home  Type of Current Residence: 3 story home  Upon entering residence, is there a bedroom on the main floor (no further steps)?: No  A bedroom is located on the following floor levels of residence (select all that apply):: 2nd Floor  Upon entering residence, is there a bathroom on the main floor (no further steps)?: No  Indicate which floors of current residence have a bathroom (select all the apply):: 2nd Floor  Number of steps to 2nd floor from main floor: One Flight  In the last 12 months, was there a time when you were not able to pay the mortgage or rent on time?: No  In the last 12 months, how many places have you lived?: 1  In the last 12 months, was there a time when you did not have a steady place to sleep or slept in a shelter (including now)?: No  Homeless/housing insecurity resource given?: N/A  Living Arrangements: Lives w/ Spouse/significant other    Activities of Daily Living Prior to Admission  Functional Status: Independent  Completes ADLs independently?: Yes  Ambulates independently?: Yes  Does patient use assisted devices?: No  Does patient currently own DME?: No  Does patient have a history of Outpatient Therapy (PT/OT)?: No  Does the patient have a history of Short-Term Rehab?: No  Does patient have a history of HHC?: No  [facility]    Patient Information Continued  Does patient have prescription coverage?: Yes  Within the past 12 months, you worried that your food would run out before you got the money to buy more : Never true  Within the past 12 months, the food you bought just didn't last and you didn't have money to get more : Never true  Food insecurity resource given?: N/A  Does patient receive dialysis treatments?: No  Does patient have a history of substance abuse?: No  Does patient have a history of Mental Health Diagnosis?: No    Means of Transportation  Means of Transport to Appts[de-identified] Drives Self  In the past 12 months, has lack of transportation kept you from medical appointments or from getting medications?: No  In the past 12 months, has lack of transportation kept you from meetings, work, or from getting things needed for daily living?: No  Was application for public transport provided?: N/A    DISCHARGE DETAILS:    Discharge planning discussed with[de-identified] patient  Freedom of Choice: Yes  Comments - Freedom of Choice: no anticipated dc needs  CM contacted family/caregiver?: No- see comments (declined; reports being in contact with family)  Were Treatment Team discharge recommendations reviewed with patient/caregiver?: Yes  Did patient/caregiver verbalize understanding of patient care needs?: Yes  Were patient/caregiver advised of the risks associated with not following Treatment Team discharge recommendations?: Yes    [facility]         Is the patient interested in [facility] at discharge?: No    DME Referral Provided  Referral made for DME?: No    Treatment Team Recommendation: Home  Discharge Destination Plan[de-identified] Home  Transport at Discharge : Family     Additional Comments: Met with pt to discuss the role of CM and to discuss any help pt may need prior to dc  Pt lives with her rosaline, father, and 3 children in a 3 story home with no CYDNEY  Pt performed ADL's indptly pta, no use of DME  No hx of HHC or rehab  No hx of mental health or D&A treatment  Pt's preferred [facility] is [facility]  Pt's rosaline will transport home at dc

## 2023-05-25 NOTE — ASSESSMENT & PLAN NOTE
"Presented due to lightheadedness and difficulty finding word  This was precipitated by an aura described as \" feeling unwell\" or \" woozy\"  Lightheadedness described as though she was going to pass out, denies vertigo/room spinning  · Hypertensive on arrival at 160/110  · At time of my exam, symptoms have fully resolved  No facial asymmetry, no difficulty talking, AAOx3, able to ambulate independently without any ataxia  · CT head, CTA head/neck, CTA dissection protocol all without any acute intracranial abnormalities  · Evaluated by neurology who recommended admission with stroke pathway:  · Sx may be related to TIA/CVA vs migraine  · Loaded with aspirin/Plavix, continue with ASA 81 mg daily and Plavix 75mg daily  If MRI neg, dc plavix  · Echo - read pending but concern for possible PFO  · MRI brain  · Neurochecks  · Telemetry  · Able to ambulate independently-does not require PT/OT evaluation  · Neurology consult    Appreciate recs  "

## 2023-05-25 NOTE — ASSESSMENT & PLAN NOTE
· ECG shows T wave inversions in inferior leads (v2, v3 ,aVF)  Previous ECG shows single lead T wave inversion in v3, but not in v2 or aVF  · Reported some chest pressure and SOB during her near syncopal episode, now resolved  · Also with elevated BP on arrival  · Troponin 2 --> 2, delta 0  · Continue to trend troponin, repeat ECG in AM, STAT ECG for any chest pain/SOB  · Obtain echo per stroke pathway, concern for PFO  Final report pending  · Cardiology consult  Pt [facility] to [facility] cardiology group

## 2023-05-25 NOTE — ASSESSMENT & PLAN NOTE
5/26/2023: New to this neurology examiner is this right-hand-dominant 44year old female seen today in follow-up  This patient has a unknown history of migraines, (since adolescence, lately they have been controlled) she also has a history of hypertension and thoracic outlet syndrome s/p surgery (2004), her third and most recent pregnancy a year ago she was noted to be prediabetic  She presented as a stroke alert on 5/24 after having chest pressure x3 days, but acute onset generalized paresthesias and dizzy sensation that began on 5/24 at 2:30 PM while she was sitting down  She reports she was also having lately some sense of palpitations in her chest   Patient reports feeling at baseline prior to the acute onset of paresthesias/dizziness  Patient also had an associated mild migrainous left-sided headache (patient reports history of migraines that are right-sided with no neurological deficits)  Overnight she has done well  Her exam today is nonlateralizing and she feels she is completely back to baseline  Her NIH on presentation was 0  's  Patient was deemed not a TNK candidate secondary to rapidly improving symptoms  Workup:  - CTH/CTA head/neck: Negative for acute intracranial abnormality, aneurysm, dissection or significant stenosis  - CTA dissection protocol unremarkable  - MRI brain is clean, and given her migraine history there were no small lesions that are often associated with migraine nurse  - Echo in terms of her ejection fraction was good however she was noted to have a small PFO with a right to left shunt with bubbles on study   - Lipid Panel: Cholesterol 201,   - Hemoglobin A1c 5 3  Even though her MRI is negative for any acute ischemic injury the etiology of presenting symptoms concerning for TIA vs migraine  Plan:  -She is stable for discharge at this time    She is going to continue with  Aspirin 81 mg daily in an ongoing manner and she is going to use 21 days only of Plavix 75 mg    -She is going to discuss with her family doctor postponing her statin and work on diet and exercise with the supplement and her cholesterol panel will be rechecked in 6 months and she may adopted at that time   -He is going to have a Zio patch to monitor for A-fib   -She is going to follow-up with cardiology most likely within a month and she is can see neurology most likely in 2 to 3 months   -I have discussed with this patient keeping a migraine or headache journal and then we can discuss with her adopting or changing her beta-blocker to propanolol so that she may benefit from migraine prophylaxis as well as blood pressure control  sunny all pertinent systems normal

## 2023-05-25 NOTE — ASSESSMENT & PLAN NOTE
SIRS, POA, since resolved, related to TIA vs migraine as evidenced by HR 90-96, RR 23 and WBC 12 08, treated with telemetry and monitoring CBC's

## 2023-05-25 NOTE — OCCUPATIONAL THERAPY NOTE
Occupational Therapy Screen Note     Patient Name: Dorina Thomson  BZZZU'H Date: 5/25/2023  Problem List  Principal Problem:    Stroke-like symptoms  Active Problems:    Migraine    Hypertension    Abnormal ECG          05/25/23 1342   OT Last Visit   OT Visit Date 05/25/23   Note Type   Note type Screen   Additional Comments Chart review completed  Per chart and [facility] Knight patient is independently ambulating and is at her functional baseline  No inpatient OT needs   D/C OT chioma Pierre, OTR/L

## 2023-05-25 NOTE — PLAN OF CARE
Problem: Potential for Falls  Goal: Patient will remain free of falls  Description: INTERVENTIONS:  - Educate patient/family on patient safety including physical limitations  - Instruct patient to call for assistance with activity   - Consult OT/PT to assist with strengthening/mobility   - Keep Call bell within reach  - Keep bed low and locked with side rails adjusted as appropriate  - Keep care items and personal belongings within reach  - Initiate and maintain comfort rounds  - Make Fall Risk Sign visible to staff  - Offer Toileting every 2 Hours, in advance of need  - Initiate/Maintain  - Obtain necessary fall risk management equipment:   - Apply yellow socks and bracelet for high fall risk patients  - Consider moving patient to room near nurses station  Outcome: Progressing     Problem: PAIN - ADULT  Goal: Verbalizes/displays adequate comfort level or baseline comfort level  Description: Interventions:  - Encourage patient to monitor pain and request assistance  - Assess pain using appropriate pain scale  - Administer analgesics based on type and severity of pain and evaluate response  - Implement non-pharmacological measures as appropriate and evaluate response  - Consider cultural and social influences on pain and pain management  - Notify physician/advanced practitioner if interventions unsuccessful or patient reports new pain  Outcome: Progressing     Problem: INFECTION - ADULT  Goal: Absence or prevention of progression during hospitalization  Description: INTERVENTIONS:  - Assess and monitor for signs and symptoms of infection  - Monitor lab/diagnostic results  - Monitor all insertion sites, i e  indwelling lines, tubes, and drains  - Monitor endotracheal if appropriate and nasal secretions for changes in amount and color  - [place] appropriate cooling/warming therapies per order  - Administer medications as ordered  - Instruct and encourage patient and family to use good hand hygiene technique  - Identify and instruct in appropriate isolation precautions for identified infection/condition  Outcome: Progressing  Goal: Absence of fever/infection during neutropenic period  Description: INTERVENTIONS:  - Monitor WBC    Outcome: Progressing     Problem: SAFETY ADULT  Goal: Patient will remain free of falls  Description: INTERVENTIONS:  - Educate patient/family on patient safety including physical limitations  - Instruct patient to call for assistance with activity   - Consult OT/PT to assist with strengthening/mobility   - Keep Call bell within reach  - Keep bed low and locked with side rails adjusted as appropriate  - Keep care items and personal belongings within reach  - Initiate and maintain comfort rounds  - Make Fall Risk Sign visible to staff  - Offer Toileting every 2Hours, in advance of need  - Initiate/Maintain   - Obtain necessary fall risk management equipment:   - Apply yellow socks and bracelet for high fall risk patients  - Consider moving patient to room near nurses station  Outcome: Progressing  Goal: Maintain or return to baseline ADL function  Description: INTERVENTIONS:  -  Assess patient's ability to carry out ADLs; assess patient's baseline for ADL function and identify physical deficits which impact ability to perform ADLs (bathing, care of mouth/teeth, toileting, grooming, dressing, etc )  - Assess/evaluate cause of self-care deficits   - Assess range of motion  - Assess patient's mobility; develop plan if impaired  - Assess patient's need for assistive devices and provide as appropriate  - Encourage maximum independence but intervene and supervise when necessary  - Involve family in performance of ADLs  - Assess for home care needs following discharge   - Consider OT consult to assist with ADL evaluation and planning for discharge  - Provide patient education as appropriate  Outcome: Progressing  Goal: Maintains/Returns to pre admission functional level  Description: INTERVENTIONS:  - Perform BMAT or MOVE assessment daily    - Set and communicate daily mobility goal to care team and patient/family/caregiver  - Collaborate with rehabilitation services on mobility goals if consulted  - Perform Range of Motion optional times a day  - Reposition patient every 2 hours  - Dangle patient optional times a day  - Stand patient 1-3 times a day  - Ambulate patient stand bytimes a day  - Out of bed to chair 1-3 times a day   - Out of bed for meals 1-3 times a day  - Out of bed for toileting  - Record patient progress and toleration of activity level   Outcome: Progressing     Problem: DISCHARGE PLANNING  Goal: Discharge to home or other facility with appropriate resources  Description: INTERVENTIONS:  - Identify barriers to discharge w/patient and caregiver  - Arrange for needed discharge resources and transportation as appropriate  - Identify discharge learning needs (meds, wound care, etc )  - Arrange for interpretive services to assist at discharge as needed  - Refer to Case Management Department for coordinating discharge planning if the patient needs post-hospital services based on physician/advanced practitioner order or complex needs related to functional status, cognitive ability, or social support system  Outcome: Progressing     Problem: Knowledge Deficit  Goal: Patient/family/caregiver demonstrates understanding of disease process, treatment plan, medications, and discharge instructions  Description: Complete learning assessment and assess knowledge base  Interventions:  - Provide teaching at level of understanding  - Provide teaching via preferred learning methods  Outcome: Progressing     Problem: Neurological Deficit  Goal: Neurological status is stable or improving  Description: Interventions:  - Monitor and assess patient's level of consciousness, motor function, sensory function, and level of assistance needed for ADLs  - Monitor and report changes from baseline   Collaborate with interdisciplinary team to initiate plan and implement interventions as ordered  - Provide and maintain a safe environment  - Consider seizure precautions  - Consider fall precautions  - Consider aspiration precautions  - Consider bleeding precautions  Outcome: Progressing     Problem: Activity Intolerance/Impaired Mobility  Goal: Mobility/activity is maintained at optimum level for patient  Description: Interventions:  - Assess and monitor patient  barriers to mobility and need for assistive/adaptive devices  - Assess patient's emotional response to limitations  - Collaborate with interdisciplinary team and initiate plans and interventions as ordered  - Encourage independent activity per ability   - Maintain proper body alignment  - Perform active/passive rom as tolerated/ordered  - Plan activities to conserve energy   - Turn patient as appropriate  Outcome: Progressing     Problem: Communication Impairment  Goal: Ability to express needs and understand communication  Description: Assess patient's communication skills and ability to understand information  Patient will demonstrate use of effective communication techniques, alternative methods of communication and understanding even if not able to speak  - Encourage communication and provide alternate methods of communication as needed  - Collaborate with case management/ for discharge needs  - Include patient/family/caregiver in decisions related to communication  Outcome: Progressing     Problem: Potential for Aspiration  Goal: Non-ventilated patient's risk of aspiration is minimized  Description: Assess and monitor vital signs, respiratory status, and labs (WBC)  Monitor for signs of aspiration (tachypnea, cough, rales, wheezing, cyanosis, fever)  - Assess and monitor patient's ability to swallow  - Place patient up in chair to eat if possible    - HOB up at 90 degrees to eat if unable to get patient up into chair   - Supervise patient during oral intake  - Instruct patient/ family to take small bites  - Instruct patient/ family to take small single sips when taking liquids  - Follow patient-specific strategies generated by speech pathologist   Outcome: Progressing  Goal: Ventilated patient's risk of aspiration is minimized  Description: Assess and monitor vital signs, respiratory status, airway cuff pressure, and labs (WBC)  Monitor for signs of aspiration (tachypnea, cough, rales, wheezing, cyanosis, fever)  - Elevate head of bed 30 degrees if patient has tube feeding   - Monitor tube feeding  Outcome: Progressing     Problem: Nutrition  Goal: Nutrition/Hydration status is improving  Description: Monitor and assess patient's nutrition/hydration status for malnutrition (ex- brittle hair, bruises, dry skin, pale skin and conjunctiva, muscle wasting, smooth red tongue, and disorientation)  Collaborate with interdisciplinary team and initiate plan and interventions as ordered  Monitor patient's weight and dietary intake as ordered or per policy  Utilize nutrition screening tool and intervene per policy  Determine patient's food preferences and provide high-protein, high-caloric foods as appropriate  - Assist patient with eating   - Allow adequate time for meals   - Encourage patient to take dietary supplement as ordered  - Collaborate with clinical nutritionist   - Include patient/family/caregiver in decisions related to nutrition    Outcome: Progressing

## 2023-05-26 VITALS
WEIGHT: 204 LBS | HEIGHT: 65 IN | BODY MASS INDEX: 33.99 KG/M2 | RESPIRATION RATE: 12 BRPM | TEMPERATURE: 98.2 F | DIASTOLIC BLOOD PRESSURE: 75 MMHG | SYSTOLIC BLOOD PRESSURE: 112 MMHG | HEART RATE: 72 BPM | OXYGEN SATURATION: 95 %

## 2023-05-26 LAB
ALBUMIN SERPL BCP-MCNC: 4 G/DL (ref 3.5–5)
ALP SERPL-CCNC: 75 U/L (ref 34–104)
ALT SERPL W P-5'-P-CCNC: 27 U/L (ref 7–52)
ANION GAP SERPL CALCULATED.3IONS-SCNC: 9 MMOL/L (ref 4–13)
AST SERPL W P-5'-P-CCNC: 18 U/L (ref 13–39)
ATRIAL RATE: 77 BPM
BASOPHILS # BLD AUTO: 0.05 THOUSANDS/ÂΜL (ref 0–0.1)
BASOPHILS NFR BLD AUTO: 1 % (ref 0–1)
BILIRUB SERPL-MCNC: 0.79 MG/DL (ref 0.2–1)
BUN SERPL-MCNC: 15 MG/DL (ref 5–25)
CALCIUM SERPL-MCNC: 8.6 MG/DL (ref 8.4–10.2)
CHLORIDE SERPL-SCNC: 102 MMOL/L (ref 96–108)
CO2 SERPL-SCNC: 27 MMOL/L (ref 21–32)
CREAT SERPL-MCNC: 0.69 MG/DL (ref 0.6–1.3)
EOSINOPHIL # BLD AUTO: 0.22 THOUSAND/ÂΜL (ref 0–0.61)
EOSINOPHIL NFR BLD AUTO: 2 % (ref 0–6)
ERYTHROCYTE [DISTWIDTH] IN BLOOD BY AUTOMATED COUNT: 12.7 % (ref 11.6–15.1)
GFR SERPL CREATININE-BSD FRML MDRD: 110 ML/MIN/1.73SQ M
GLUCOSE SERPL-MCNC: 96 MG/DL (ref 65–140)
HCT VFR BLD AUTO: 38.2 % (ref 34.8–46.1)
HGB BLD-MCNC: 12.7 G/DL (ref 11.5–15.4)
IMM GRANULOCYTES # BLD AUTO: 0.05 THOUSAND/UL (ref 0–0.2)
IMM GRANULOCYTES NFR BLD AUTO: 1 % (ref 0–2)
LYMPHOCYTES # BLD AUTO: 2.95 THOUSANDS/ÂΜL (ref 0.6–4.47)
LYMPHOCYTES NFR BLD AUTO: 29 % (ref 14–44)
MAGNESIUM SERPL-MCNC: 2 MG/DL (ref 1.9–2.7)
MCH RBC QN AUTO: 31.1 PG (ref 26.8–34.3)
MCHC RBC AUTO-ENTMCNC: 33.2 G/DL (ref 31.4–37.4)
MCV RBC AUTO: 94 FL (ref 82–98)
MONOCYTES # BLD AUTO: 0.85 THOUSAND/ÂΜL (ref 0.17–1.22)
MONOCYTES NFR BLD AUTO: 8 % (ref 4–12)
NEUTROPHILS # BLD AUTO: 6.11 THOUSANDS/ÂΜL (ref 1.85–7.62)
NEUTS SEG NFR BLD AUTO: 59 % (ref 43–75)
NRBC BLD AUTO-RTO: 0 /100 WBCS
P AXIS: 33 DEGREES
PLATELET # BLD AUTO: 264 THOUSANDS/UL (ref 149–390)
PMV BLD AUTO: 10.7 FL (ref 8.9–12.7)
POTASSIUM SERPL-SCNC: 3.5 MMOL/L (ref 3.5–5.3)
PR INTERVAL: 124 MS
PROT SERPL-MCNC: 6.7 G/DL (ref 6.4–8.4)
QRS AXIS: -3 DEGREES
QRSD INTERVAL: 88 MS
QT INTERVAL: 408 MS
QTC INTERVAL: 461 MS
RBC # BLD AUTO: 4.08 MILLION/UL (ref 3.81–5.12)
SODIUM SERPL-SCNC: 138 MMOL/L (ref 135–147)
T WAVE AXIS: -18 DEGREES
VENTRICULAR RATE: 77 BPM
WBC # BLD AUTO: 10.23 THOUSAND/UL (ref 4.31–10.16)

## 2023-05-26 RX ORDER — METOPROLOL SUCCINATE 25 MG/1
25 TABLET, EXTENDED RELEASE ORAL 2 TIMES DAILY
Qty: 60 TABLET | Refills: 0 | Status: SHIPPED | OUTPATIENT
Start: 2023-05-26

## 2023-05-26 RX ORDER — LANOLIN ALCOHOL/MO/W.PET/CERES
6 CREAM (GRAM) TOPICAL
Status: DISCONTINUED | OUTPATIENT
Start: 2023-05-26 | End: 2023-05-26

## 2023-05-26 RX ORDER — CLOPIDOGREL BISULFATE 75 MG/1
75 TABLET ORAL DAILY
Qty: 20 TABLET | Refills: 0 | Status: SHIPPED | OUTPATIENT
Start: 2023-05-27

## 2023-05-26 RX ORDER — ASPIRIN 81 MG/1
81 TABLET, CHEWABLE ORAL DAILY
Qty: 30 TABLET | Refills: 0 | Status: SHIPPED | OUTPATIENT
Start: 2023-05-27

## 2023-05-26 RX ADMIN — METOPROLOL SUCCINATE 25 MG: 25 TABLET, EXTENDED RELEASE ORAL at 09:07

## 2023-05-26 RX ADMIN — ASPIRIN 81 MG CHEWABLE TABLET 81 MG: 81 TABLET CHEWABLE at 09:07

## 2023-05-26 RX ADMIN — CLOPIDOGREL BISULFATE 75 MG: 75 TABLET ORAL at 09:07

## 2023-05-26 NOTE — ASSESSMENT & PLAN NOTE
· ECG shows T wave inversions in inferior leads (v2, v3 ,aVF)  Previous ECG shows single lead T wave inversion in v3, but not in v2 or aVF  · Reported some chest pressure and SOB during her near syncopal episode, now resolved  · Also with elevated BP on arrival  · Troponin 2 --> 2, delta 0  · Echo results as above  · Cardiology consult  Pt would like to switch from [facility] to [facility] cardiology group

## 2023-05-26 NOTE — PLAN OF CARE
Problem: PAIN - ADULT  Goal: Verbalizes/displays adequate comfort level or baseline comfort level  Description: Interventions:  - Encourage patient to monitor pain and request assistance  - Assess pain using appropriate pain scale  - Administer analgesics based on type and severity of pain and evaluate response  - Implement non-pharmacological measures as appropriate and evaluate response  - Consider cultural and social influences on pain and pain management  - Notify physician/advanced practitioner if interventions unsuccessful or patient reports new pain  Outcome: Progressing     Problem: INFECTION - ADULT  Goal: Absence or prevention of progression during hospitalization  Description: INTERVENTIONS:  - Assess and monitor for signs and symptoms of infection  - Monitor lab/diagnostic results  - Monitor all insertion sites, i e  indwelling lines, tubes, and drains  - Monitor endotracheal if appropriate and nasal secretions for changes in amount and color  - [place] appropriate cooling/warming therapies per order  - Administer medications as ordered  - Instruct and encourage patient and family to use good hand hygiene technique  - Identify and instruct in appropriate isolation precautions for identified infection/condition  Outcome: Progressing  Goal: Absence of fever/infection during neutropenic period  Description: INTERVENTIONS:  - Monitor WBC    Outcome: Progressing     Problem: DISCHARGE PLANNING  Goal: Discharge to home or other facility with appropriate resources  Description: INTERVENTIONS:  - Identify barriers to discharge w/patient and caregiver  - Arrange for needed discharge resources and transportation as appropriate  - Identify discharge learning needs (meds, wound care, etc )  - Arrange for interpretive services to assist at discharge as needed  - Refer to Case Management Department for coordinating discharge planning if the patient needs post-hospital services based on physician/advanced practitioner order or complex needs related to functional status, cognitive ability, or social support system  Outcome: Progressing     Problem: Knowledge Deficit  Goal: Patient/family/caregiver demonstrates understanding of disease process, treatment plan, medications, and discharge instructions  Description: Complete learning assessment and assess knowledge base  Interventions:  - Provide teaching at level of understanding  - Provide teaching via preferred learning methods  Outcome: Progressing     Problem: Neurological Deficit  Goal: Neurological status is stable or improving  Description: Interventions:  - Monitor and assess patient's level of consciousness, motor function, sensory function, and level of assistance needed for ADLs  - Monitor and report changes from baseline  Collaborate with interdisciplinary team to initiate plan and implement interventions as ordered  - Provide and maintain a safe environment  - Consider seizure precautions  - Consider fall precautions  - Consider aspiration precautions  - Consider bleeding precautions  Outcome: Progressing     Problem: Activity Intolerance/Impaired Mobility  Goal: Mobility/activity is maintained at optimum level for patient  Description: Interventions:  - Assess and monitor patient  barriers to mobility and need for assistive/adaptive devices  - Assess patient's emotional response to limitations  - Collaborate with interdisciplinary team and initiate plans and interventions as ordered  - Encourage independent activity per ability   - Maintain proper body alignment  - Perform active/passive rom as tolerated/ordered  - Plan activities to conserve energy   - Turn patient as appropriate  Outcome: Progressing     Problem: Communication Impairment  Goal: Ability to express needs and understand communication  Description: Assess patient's communication skills and ability to understand information    Patient will demonstrate use of effective communication techniques, alternative methods of communication and understanding even if not able to speak  - Encourage communication and provide alternate methods of communication as needed  - Collaborate with case management/ for discharge needs  - Include patient/family/caregiver in decisions related to communication  Outcome: Progressing     Problem: Potential for Aspiration  Goal: Non-ventilated patient's risk of aspiration is minimized  Description: Assess and monitor vital signs, respiratory status, and labs (WBC)  Monitor for signs of aspiration (tachypnea, cough, rales, wheezing, cyanosis, fever)  - Assess and monitor patient's ability to swallow  - Place patient up in chair to eat if possible  - HOB up at 90 degrees to eat if unable to get patient up into chair   - Supervise patient during oral intake  - Instruct patient/ family to take small bites  - Instruct patient/ family to take small single sips when taking liquids  - Follow patient-specific strategies generated by speech pathologist   Outcome: Progressing  Goal: Ventilated patient's risk of aspiration is minimized  Description: Assess and monitor vital signs, respiratory status, airway cuff pressure, and labs (WBC)  Monitor for signs of aspiration (tachypnea, cough, rales, wheezing, cyanosis, fever)  - Elevate head of bed 30 degrees if patient has tube feeding   - Monitor tube feeding  Outcome: Progressing     Problem: Nutrition  Goal: Nutrition/Hydration status is improving  Description: Monitor and assess patient's nutrition/hydration status for malnutrition (ex- brittle hair, bruises, dry skin, pale skin and conjunctiva, muscle wasting, smooth red tongue, and disorientation)  Collaborate with interdisciplinary team and initiate plan and interventions as ordered  Monitor patient's weight and dietary intake as ordered or per policy  Utilize nutrition screening tool and intervene per policy   Determine patient's food preferences and provide high-protein, high-caloric foods as appropriate  - Assist patient with eating   - Allow adequate time for meals   - Encourage patient to take dietary supplement as ordered  - Collaborate with clinical nutritionist   - Include patient/family/caregiver in decisions related to nutrition    Outcome: Progressing

## 2023-05-26 NOTE — DISCHARGE SUMMARY
"[place]  Discharge- Nicolasa Thomson 1985, 44 y o  female MRN: [id number]  Unit/Bed#: -01 Encounter: [id number]  Primary Care Provider: Dyke Gottron, MD   Date and time admitted to hospital: 5/24/2023  4:01 PM    SIRS (systemic inflammatory response syndrome) ([facility] [address] )  Assessment & Plan  SIRS, POA, since resolved, related to TIA vs migraine as evidenced by HR 90-96, RR 23 and WBC 12 08, treated with telemetry and monitoring CBC's  Abnormal ECG  Assessment & Plan  · ECG shows T wave inversions in inferior leads (v2, v3 ,aVF)  Previous ECG shows single lead T wave inversion in v3, but not in v2 or aVF  · Reported some chest pressure and SOB during her near syncopal episode, now resolved  · Also with elevated BP on arrival  · Troponin 2 --> 2, delta 0  · Echo results as above  · Cardiology consult  Pt would like to switch from [facility] to [facility] cardiology group  Hypertension  Assessment & Plan  New onset HTN as of 3 weeks ago  Started on HCTZ 25mg and Toprol XL 25mg by [facility] cardiologist  Stop HCTZ d/t hypokalemia  Transition to BID toprol XL as instructed by cards  Migraine  Assessment & Plan  Hx of migraines, on PRN sumatriptan  Hold triptans until CVA ruled out  Denied any headaches/migraine prior to her stroke-like episode    * Stroke-like symptoms  Assessment & Plan  Presented due to lightheadedness and difficulty finding word  This was precipitated by an aura described as \" feeling unwell\" or \" woozy\"  Lightheadedness described as though she was going to pass out, denies vertigo/room spinning  · Hypertensive on arrival at 160/110  · At time of my exam, symptoms have fully resolved  No facial asymmetry, no difficulty talking, AAOx3, able to ambulate independently without any ataxia    · CT head, CTA head/neck, CTA dissection protocol all without any acute intracranial abnormalities  · Evaluated by neurology who recommended admission with stroke " pathway:  · Sx may be related to TIA/CVA vs migraine  · Loaded with aspirin/Plavix, continue with ASA 81 mg daily and Plavix 75mg daily  DAPT X 21 d then asa mono therapy     · Echo - bicuspid AV, pfo present  · MRI brain - no stroke  · Neurochecks  · Telemetry  · Able to ambulate independently-does not require PT/OT evaluation  · Neurology consult  Appreciate recs  Resolved  Hospital Course:     Evans Tracy is a 44 y o  female patient who originally presented to the hospital on   Admission Orders (From admission, onward)     Ordered        05/24/23 1747  INPATIENT ADMISSION  Once                     due to strokelike symptoms  Fortunately resolved to baseline  No stroke visualized on MRI  She will follow-up with neurology and undergo dual antiplatelet therapy as outlined above  She was seen by cardiology, transition to Toprol solely for blood management of blood pressure  She will follow-up with our cardiology team as an outpatient  Please see above list of diagnoses and related plan for additional information  Physical Exam:    GEN: No acute distress, comfortable  HEEENT: No JVD, PERRLA, no scleral icterus  RESP: Lungs clear to auscultation bilaterally  CV: RRR, +s1/s2   ABD: SOFT NON TENDER, POSITIVE BOWEL SOUNDS, NO DISTENTION  PSYCH: CALM  NEURO: [place] baseline, NO FOCAL DEFICITS  SKIN: NO RASH  EXTREM: NO EDEMA    CONSULTING PROVIDERS   IP CONSULT TO NEUROLOGY  IP CONSULT TO CARDIOLOGY    PROCEDURES PERFORMED  * No surgery found *    RADIOLOGY RESULTS  VAS lower limb venous duplex study, complete bilateral    Result Date: 5/25/2023  Narrative:  [facility] REPORT CLINICAL: Indications: Physician wants to determine patency of the venous system secondary to history of DVT, SOB and PFO  Patient is on plavix  Operative History: 2004-01-01 Rib resection (Thoracic Outlet Syndrome) Risk Factors The patient has history of DVT     CONCLUSION:  Impression: RIGHT LOWER LIMB: No evidence of acute or chronic deep vein thrombosis  No evidence of superficial thrombophlebitis noted  Doppler evaluation shows a normal response to augmentation maneuvers    Popliteal, posterior tibial and anterior tibial arterial Doppler waveforms are triphasic  LEFT LOWER LIMB: No evidence of acute or chronic deep vein thrombosis  No evidence of superficial thrombophlebitis noted  Doppler evaluation shows a normal response to augmentation maneuvers  Popliteal, posterior tibial and anterior tibial arterial Doppler waveforms are triphasic  Technical findings were given to Yuli Aaron MD 5/25/2023 @ [phone]  SIGNATURE: Electronically Signed by: Esau Pavon MD on 2023-05-25 10:11:02 PM    Echo complete w/ contrast if indicated    Addendum Date: 5/25/2023 Addendum:   •  Left Ventricle: Left ventricular cavity size is normal  Wall thickness is normal  The left ventricular ejection fraction is 55%  Systolic function is normal  Global longitudinal strain is normal at -20%  Wall motion is normal  Diastolic function is normal  •  Right Ventricle: Right ventricular cavity size is normal  Systolic function is normal  •  Atrial Septum: There is a small and patent foramen ovale confirmed with provocation (cough) with predominant right to left shunting using saline contrast  •  Aortic Valve: The aortic valve is functionally bicuspid  The leaflets are not thickened  The leaflets are not calcified  The leaflets exhibit normal mobility  There is mild regurgitation  •  Mitral Valve: There is mild regurgitation  Strain was performed to quantify interventricular dyssynchrony and evaluate components of myocardial function due to chest pressure and tightness  Results from the utilization of Strain Analysis are listed in the report below  Result Date: 5/25/2023  Narrative: •  Left Ventricle: Left ventricular cavity size is normal  Wall thickness is normal  The left ventricular ejection fraction is 55%   Systolic function is normal  Global longitudinal strain is normal at -20%  Wall motion is normal  Diastolic function is normal  •  Right Ventricle: Right ventricular cavity size is normal  Systolic function is normal  •  Atrial Septum: There is a small and patent foramen ovale confirmed with provocation (cough) with predominant right to left shunting using saline contrast  •  Aortic Valve: There is mild regurgitation  •  Mitral Valve: There is mild regurgitation  Strain was performed to quantify interventricular dyssynchrony and evaluate components of myocardial function due to chest pressure and tightness  Results from the utilization of Strain Analysis are listed in the report below  MRI brain wo contrast    Result Date: 5/25/2023  Narrative: MRI BRAIN WITHOUT CONTRAST INDICATION: stroke  COMPARISON:   CT/CTA dated 5/24/2023  TECHNIQUE:  Multiplanar, multisequence imaging of the brain was performed  IMAGE QUALITY:  Diagnostic  FINDINGS: BRAIN PARENCHYMA:  There is no discrete mass, mass effect or midline shift  There is no intracranial hemorrhage  There is no evidence of acute infarction and diffusion imaging is unremarkable  There are no white matter changes in the cerebral hemispheres  VENTRICLES:  Normal for the patient's age  SELLA AND PITUITARY GLAND:  Normal  ORBITS:  Normal  PARANASAL SINUSES:  Normal  VASCULATURE:  Evaluation of the major intracranial vasculature demonstrates appropriate flow voids  CALVARIUM AND SKULL BASE:  Normal  EXTRACRANIAL SOFT TISSUES:  Normal      Impression: No intracranial pathology  Workstation performed: [id number]     CTA dissection protocol chest/abdomen/pelvis    Result Date: 5/24/2023  Narrative: CTA - CHEST, ABDOMEN AND PELVIS - WITHOUT AND WITH IV CONTRAST INDICATION:   chest pain  Syncope history of acute MI  Dissection but: COMPARISON:  None   TECHNIQUE: CT examination of the chest, abdomen and pelvis was performed both prior to and after the administration of intravenous contrast  The noncontrast portion of this examination was performed utilizing low radiation dose technique  Thin section angiographic arterial phase post contrast technique was used in order to evaluate for aortic dissection  3D reformatted images and volume rendering were performed on an independent workstation  Multiplanar 2D reformatted images were created from the source data  Radiation dose length product (DLP) for this visit:  715 mGy-cm   This examination, like all CT scans performed in the [facility], was performed utilizing techniques to minimize radiation dose exposure, including the use of iterative reconstruction and automated exposure control  IV Contrast:  140 because diffuse gating the biliary during the normal [place] 0 thank you its its distal  Please with CT for this old: She yellow she is nice but of iohexol (OMNIPAQUE) Enteric Contrast:  Enteric contrast was not administered  FINDINGS: AORTA: No thoracic or abdominal aortic aneurysm or dissection  No stenosis or aneurysm involving the iliac or common femoral arteries  No stenosis in the mesenteric or renal arteries  CHEST LUNGS:  Lungs are clear  There is no tracheal or endobronchial lesion  PLEURA:  Unremarkable  HEART/PULMONARY ARTERIES: Normal heart size  Pulmonary arteries not diagnostically evaluated due to contrast bolus timing  MEDIASTINUM AND YVROSE: No lymphadenopathy CHEST WALL AND LOWER NECK:  Unremarkable  ABDOMEN LIVER/BILIARY TREE: Decreased attenuation throughout the liver consistent with hepatic steatosis  GALLBLADDER:  No calcified gallstones  No pericholecystic inflammatory change  SPLEEN:  Unremarkable  PANCREAS:  Unremarkable  ADRENAL GLANDS:  Unremarkable  KIDNEYS symmetric late nephrographic and urographic phase enhancement of the kidneys  No obstructive uropathy STOMACH AND BOWEL: No evidence of bowel obstruction, colitis or diverticulitis  APPENDIX: Normal appendix   ABDOMINOPELVIC CAVITY:  No ascites or free intraperitoneal air  No lymphadenopathy  PELVIS REPRODUCTIVE ORGANS: No adnexal mass or cyst URINARY BLADDER:  Unremarkable  ABDOMINAL WALL/INGUINAL REGIONS:  Unremarkable  OSSEOUS STRUCTURES:  No acute fracture or destructive osseous lesion  Impression: 1  No evidence of thoracic or abdominal aortic aneurysm or dissection  2  No evidence of acute cardiopulmonary process  3  Hepatic steatosis  Workstation performed: [id number]     CT stroke alert brain    Result Date: 5/24/2023  Narrative: CT BRAIN - STROKE ALERT PROTOCOL INDICATION:   Neuro deficit, acute, stroke suspected Stroke Alert  COMPARISON:  None  TECHNIQUE:  CT examination of the brain was performed  In addition to axial images, coronal reformatted images were created and submitted for interpretation  Radiation dose length product (DLP) for this visit:   This examination, like all CT scans performed in the [facility], was performed utilizing techniques to minimize radiation dose exposure, including the use of iterative reconstruction  and automated exposure control  IMAGE QUALITY: Streak and beam hardening artifact partially obscures the field-of-view, arising from metallic object within the patient's hair, not removed for scan  FINDINGS: PARENCHYMA: No evidence of acute vascular territorial infarction  No intracranial hemorrhage or mass effect  VENTRICLES AND EXTRA-AXIAL SPACES: No hydrocephalus or extra-axial collection  VISUALIZED ORBITS: Intact globes and orbits  PARANASAL SINUSES: Clear  CALVARIUM AND EXTRACRANIAL SOFT TISSUES:   No lytic or blastic lesion  Impression: No evidence of acute vascular territorial infarction, intracranial hemorrhage or mass   Findings were directly discussed with Monroe Lyons  at  4:40 PM    Workstation performed: [id number]     CTA stroke alert (head/neck)    Result Date: 5/24/2023  Narrative: CTA NECK AND BRAIN WITH CONTRAST INDICATION: Neuro deficit, acute, stroke suspected Stroke Alert COMPARISON:   None  TECHNIQUE:   Post contrast imaging was performed after administration of iodinated contrast through the neck and brain  Post contrast axial 0 625 mm images timed to opacify the arterial system  3D rendering was performed on an independent workstation  MIP reconstructions performed  Coronal reconstructions were performed of the noncontrast portion of the brain  Radiation dose length product (DLP) for this visit:  653 mGy-cm   This examination, like all CT scans performed in the [facility], was performed utilizing techniques to minimize radiation dose exposure, including the use of iterative reconstruction and automated exposure control  IV Contrast:  140 mL of iohexol (OMNIPAQUE) IMAGE QUALITY:   Diagnostic FINDINGS: CERVICAL VASCULATURE AORTIC ARCH AND GREAT VESSELS: Three-vessel configuration of the aortic arch  No stenosis in the subclavian arteries  RIGHT VERTEBRAL ARTERY CERVICAL SEGMENT:  Normal origin  The vessel is normal in caliber throughout the neck  LEFT VERTEBRAL ARTERY CERVICAL SEGMENT:  Normal origin  The vessel is normal in caliber throughout the neck  RIGHT EXTRACRANIAL CAROTID SEGMENT:  Normal caliber common carotid artery  Normal bifurcation and cervical internal carotid artery  No stenosis or dissection  LEFT EXTRACRANIAL CAROTID SEGMENT:  Normal caliber common carotid artery  Normal bifurcation and cervical internal carotid artery  No stenosis or dissection  NASCET criteria was used to determine the degree of internal carotid artery diameter stenosis  INTRACRANIAL VASCULATURE INTERNAL CAROTID ARTERIES: No stenosis in the carotid siphons  ANTERIOR CIRCULATION:  Symmetric A1 segments and anterior cerebral arteries with normal enhancement  Normal anterior communicating artery  MIDDLE CEREBRAL ARTERY CIRCULATION:  M1 segment and middle cerebral artery branches demonstrate normal enhancement bilaterally   DISTAL VERTEBRAL ARTERIES:  Normal distal vertebral arteries  Posterior inferior cerebellar arteries are patent  BASILAR ARTERY:  Basilar artery is normal in caliber  Patent superior cerebellar arteries  POSTERIOR CEREBRAL ARTERIES: Tiny left posterior communicating artery visualized  No stenosis in the posterior cerebral arteries  VENOUS STRUCTURES: Patent dural venous sinuses  NON VASCULAR ANATOMY BONY STRUCTURES: No lytic or blastic lesion  SOFT TISSUES OF THE NECK:  Normal  THORACIC INLET:  Unremarkable  Impression: No stenosis, dissection or occlusion of the carotid or vertebral arteries or major vessels of the [place]  Findings were directly discussed with Chantel Ramirez  at   5:00 PM   Workstation performed: [id number]     XR chest 2 views    Result Date: 5/24/2023  Narrative: CHEST INDICATION:   dizzy, sob  COMPARISON: 3/25/2022 EXAM PERFORMED/VIEWS:  XR CHEST PA & LATERAL FINDINGS: Cardiomediastinal silhouette appears unremarkable  The lungs are clear  No pneumothorax or pleural effusion  Osseous structures appear within normal limits for patient age  Impression: No acute cardiopulmonary disease   Workstation performed: [id number]       LABS  Results from last 7 days   Lab Units 05/26/23  0504 05/25/23  0538 05/24/23  1546   HEMATOCRIT % 38 2 36 9 42 2   HEMOGLOBIN g/dL 12 7 12 5 14 7   INR   --   --  1 02   MCV fL 94 92 90   PLATELETS Thousands/uL 264 255 301   WBC Thousand/uL 10 23* 9 18 12 08*     Results from last 7 days   Lab Units 05/26/23  0504 05/25/23  0538 05/24/23  1546   ALBUMIN g/dL 4 0  --  4 7   ALK PHOS U/L 75  --  103   ALT U/L 27  --  36   AST U/L 18  --  58*   BUN mg/dL 15 18 17   CALCIUM mg/dL 8 6 9 0 10 1   CHLORIDE mmol/L 102 101 93*   CO2 mmol/L 27 28 23   CREATININE mg/dL 0 69 0 85 0 76   EGFR ml/min/1 73sq m 110 87 99   GLUCOSE RANDOM mg/dL 96 136 101   POTASSIUM mmol/L 3 5 3 3* 4 4   SODIUM mmol/L 138 138 134*   TOTAL BILIRUBIN mg/dL 0 79  --  1 07*     Results from last 7 days   Lab Units 05/24/23  2753 "05/24/23  1546   HS TNI 0HR ng/L  --  2   HS TNI 2HR ng/L 2  --               Results from last 7 days   Lab Units 05/24/23  1618   POC GLUCOSE mg/dl 83     Results from last 7 days   Lab Units 05/25/23  0538   HEMOGLOBIN A1C % 5 3     Results from last 7 days   Lab Units 05/24/23  1546   TSH 3RD GENERATON uIU/mL 3 385         Results from last 7 days   Lab Units 05/25/23  0538   CHOLESTEROL mg/dL 201*   HDL mg/dL 45*   LDL CALC mg/dL 122*   TRIGLYCERIDES mg/dL 171*       Cultures:         Invalid input(s): \"URIBILINOGEN\"        Results from last 7 days   Lab Units 05/24/23  1619   INFLUENZA A PCR  Negative         Condition at Discharge:  good      Discharge instructions/Information to patient and family:   See after visit summary for information provided to patient and family  Provisions for Follow-Up Care:  See after visit summary for information related to follow-up care and any pertinent home health orders  Disposition:     Home       Discharge Statement:  I spent 40 minutes discharging the patient  This time was spent on the day of discharge  I had direct contact with the patient on the day of discharge  Greater than 50% of the total time was spent examining patient, answering all patient questions, arranging and discussing plan of care with patient as well as directly providing post-discharge instructions  Additional time then spent on discharge activities  Discharge Medications:  See after visit summary for reconciled discharge medications provided to patient and family        ** Please Note: This note has been constructed using a voice recognition system **    "

## 2023-05-26 NOTE — ASSESSMENT & PLAN NOTE
New onset HTN as of 3 weeks ago  Started on HCTZ 25mg and Toprol XL 25mg by [facility] cardiologist  Stop HCTZ d/t hypokalemia  Transition to BID toprol XL as instructed by cards

## 2023-05-26 NOTE — PROGRESS NOTES
Cardiology Progress Note   Gillian Thomson 44 y o  female MRN: [id number]  Unit/Bed#: -01 Encounter: [id number]    ASSESSMENT:  1  Stroke-like symptoms  a  Possible TIA vs migraine  b  CT brain, CTA head and neck, MRI brain negative for abnormalities  c  Neurology following; plan for DAPT with ASA/Plavix x 30 days followed by aspirin monotherapy thereafter  d  Statin discontinued as patient is currently breast-feeding  2  Chest tightness/palpitations  a  Started Sunday, feels tight in the chest to shoulders, intermittent 4-5/10 tightness feels she needs to breath deep to get a breath  Denies exertional worsening   b  HS troponin trend: 2/2/2  c  EKG on admission SR with diffuse T wave changes  d  CT dissection protocol: No evidence of thoracic or abdominal aortic aneurysm or dissection  e  TTE ordered and pending  3  Bicuspid aortic valve  a  As noted on TTE 10/2021, functionally bicuspid AV with with fusion of RCC and LCC, mild to moderate AR  4  NSVT  a  5-beat run noted overnight-- reported on palpitations at that time  b  On Toprol XL 25mg  c  BMP shows K this AM- 3 3  5  Small PFO   1  On ASA indefinitely  6  Hypertension  a  BP on admission 161/106 > last reading 113/71  b  Home regimen on admission: HCTZ 25 mg, Lopressor 25 mg  7  Hyperlipidemia  a  FLP 5/25/2023: , , HDL 45,   b  Started atorvastatin 40 mg daily > discontinued due to patient currently breast-feeding  c  Wishes to control via diet and exercise  8  Thoracic outlet syndrome  9   BMI 33 95     PLAN/ DISCUSSION:     • TTE shows preserved LVEF, small PFO noted; ASA  81mg QD   • Overnight telemetry reviewed, no further evidence of NSVT  • Mild hypokalemia resolved (3 5 this AM) with PO replenishment; HCTZ discontinued  • Plan for outpatient ZIO event monitor for 2 weeks for extended rhythm monitoring  • Continue Toprol-XL 25 mg twice daily  • Consider outpatient ischemic evaluation, will discuss further in "office  • Patient is stable cardiac wise for discharge, plan for outpatient follow-up in 4-6 weeks  Subjective:   Patient seen and examined  Overnight events reviewed  No acute complaints at present  Chintan Thomas for discharge  Objective:   Vitals: Blood pressure 112/75, pulse 72, temperature 98 2 °F (36 8 °C), resp  rate 12, height 5' 5\" (1 651 m), weight 92 5 kg (204 lb), SpO2 95 %, not currently breastfeeding , Body mass index is 33 95 kg/m² ,   Orthostatic Blood Pressures    Flowsheet Row Most Recent Value   Blood Pressure 112/75 filed at 05/26/2023 0741   Patient Position - Orthostatic VS Sitting filed at 05/25/2023 8272        No intake or output data in the 24 hours ending 05/26/23 1227    Physical Exam:  GEN: Soheila Thomson appears well, alert and oriented x 3, pleasant and cooperative   HEENT: Sclera anicteric, conjunctivae pink, mucous membranes moist  Oropharynx clear  NECK: supple, no significant JVD, Trachea midline, no thyromegaly  HEART: regular rhythm, normal S1 and S2, no murmurs, clicks, gallops or rubs   LUNGS: clear to auscultation bilaterally; no wheezes, rales, or rhonchi  No increased work of breathing or signs of respiratory distress  ABDOMEN: Soft, nontender, nondistended  EXTREMITIES: Skin warm and well perfused, no clubbing, cyanosis, or edema  NEURO: No focal findings  Normal speech  Mood and affect normal    SKIN: Normal without suspicious lesions on exposed skin      Medications:    Current Facility-Administered Medications:   •  aspirin chewable tablet 81 mg, 81 mg, Oral, Daily, Maxine Shelton PA-C, 81 mg at 05/26/23 0907  •  clopidogrel (PLAVIX) tablet 75 mg, 75 mg, Oral, Daily, Maxine Shelton PA-C, 75 mg at 05/26/23 2844  •  metoprolol succinate (TOPROL-XL) 24 hr tablet 25 mg, 25 mg, Oral, BID, Berto Peoples PA-C, 25 mg at 05/26/23 8916     Labs & Results:      Results from last 7 days   Lab Units 05/26/23  0504 05/25/23  0538 05/24/23  1546   HEMATOCRIT % " 38 2 36 9 42 2   HEMOGLOBIN g/dL 12 7 12 5 14 7   PLATELETS Thousands/uL 264 255 301   WBC Thousand/uL 10 23* 9 18 12 08*     Results from last 7 days   Lab Units 05/25/23  0538   HDL mg/dL 45*   TRIGLYCERIDES mg/dL 171*     Results from last 7 days   Lab Units 05/26/23  0504 05/25/23  0538 05/24/23  1546   ALK PHOS U/L 75  --  103   ALT U/L 27  --  36   AST U/L 18  --  58*   BUN mg/dL 15 18 17   CALCIUM mg/dL 8 6 9 0 10 1   CHLORIDE mmol/L 102 101 93*   CO2 mmol/L 27 28 23   CREATININE mg/dL 0 69 0 85 0 76   POTASSIUM mmol/L 3 5 3 3* 4 4     Results from last 7 days   Lab Units 05/24/23  1546   INR  1 02   PTT seconds 30     Results from last 7 days   Lab Units 05/26/23  0504 05/25/23  0538   MAGNESIUM mg/dL 2 0 2 1

## 2023-05-26 NOTE — PROGRESS NOTES
[facility]  Neurology progress Note - Name: Buzz Novak I  MRN: [id number]  Unit/Bed#: -01 I Date of Admission: 5/24/2023   Date of Service: 5/26/2023 I Hospital Day: 2    Assessment/Plan   * Stroke-like symptoms  Assessment & Plan  5/26/2023: New to this neurology examiner is this right-hand-dominant 44year old female seen today in follow-up  This patient has a unknown history of migraines, (since adolescence, lately they have been controlled) she also has a history of hypertension and thoracic outlet syndrome s/p surgery (2004), her third and most recent pregnancy a year ago she was noted to be prediabetic  She presented as a stroke alert on 5/24 after having chest pressure x3 days, but acute onset generalized paresthesias and dizzy sensation that began on 5/24 at 2:30 PM while she was sitting down  She reports she was also having lately some sense of palpitations in her chest   Patient reports feeling at baseline prior to the acute onset of paresthesias/dizziness  Patient also had an associated mild migrainous left-sided headache (patient reports history of migraines that are right-sided with no neurological deficits)  Overnight she has done well  Her exam today is nonlateralizing and she feels she is completely back to baseline  Her NIH on presentation was 0  's  Patient was deemed not a TNK candidate secondary to rapidly improving symptoms     Workup:  - CTH/CTA head/neck: Negative for acute intracranial abnormality, aneurysm, dissection or significant stenosis  - CTA dissection protocol unremarkable  - MRI brain is clean, and given her migraine history there were no small lesions that are often associated with migraine nurse  - Echo in terms of her ejection fraction was good however she was noted to have a small PFO with a right to left shunt with bubbles on study   - Lipid Panel: Cholesterol 201,   - Hemoglobin A1c 5 3  Even though her MRI is negative for any acute ischemic injury the etiology of presenting symptoms concerning for TIA vs migraine  Plan:  -She is stable for discharge at this time  She is going to continue with  Aspirin 81 mg daily in an ongoing manner and she is going to use 21 days only of Plavix 75 mg    -She is going to discuss with her family doctor postponing her statin and work on diet and exercise with the supplement and her cholesterol panel will be rechecked in 6 months and she may adopted at that time   -He is going to have a Zio patch to monitor for A-fib   -She is going to follow-up with cardiology most likely within a month and she is can see neurology most likely in 2 to 3 months   -I have discussed with this patient keeping a migraine or headache journal and then we can discuss with her adopting or changing her beta-blocker to propanolol so that she may benefit from migraine prophylaxis as well as blood pressure control  Migraine  Assessment & Plan  This patient reports that she has had migraines since literally middle school  She reports they have waxed and waned over the course of her life  She reports that she is frequently used to just dealing with her migraines although she reports that on occasions they are so significant that she does have difficulty working  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 she reports she has a very little time for a therapeutic exercise or modified lifestyle  She reports that this is the first suspected migraine variant she has had  She reports she has no headache today and she is otherwise feeling well  This patient will need to follow-up with neurology in our outpatient offices  She can be seen at any of our practices which ever is convenient by any of our team members    She should be keeping a headache or migraine journal as we can consider in conjunction with cardiology switching her metoprolol to "propanolol so that she can have a dual benefit for hypertensive as well as migraine control  She will continue on aspirin and Plavix for 3 weeks and then she will stay on aspirin alone  She does have a dyslipidemic panel but she does need to work on cholesterol control and she prefers to do that with diet and exercise rather than adopting medication at this point  Subjective/Objective     Subjective: I feel great again I am ready to go home    ROS: The patient denies all with a cued Jack Oranteser  She is not quite at baseline she feels quite good  Medication Dose Route Frequency   • aspirin  81 mg Oral Daily   • clopidogrel  75 mg Oral Daily   • metoprolol succinate  25 mg Oral BID         Vitals: Blood pressure 112/75, pulse 72, temperature 98 2 °F (36 8 °C), resp  rate 12, height 5' 5\" (1 651 m), weight 92 5 kg (204 lb), SpO2 95 %, not currently breastfeeding  ,Body mass index is 33 95 kg/m²  Physical Exam:     Karolyn Car seen in: She was examined at the bedside her films were reviewed she was gaited in the room etc   General appearance: alert,   Neck, Lungs, Heart, & abdomen: WNL  Extremities: atraumatic, no cyanosis or edema    Neurologic:   Mental status: Alert, oriented, thought content appropriate  CN: exam EOM's I, Gaze conjugate  Non lateralizing sensory & motor exam, (PP not tested on face)  Reminder CNVIII-XII normal    Motor: full power age appropriate x 4 limbs  Sensory: grossly intact  X 4 limbs, PP not tested  Cerebellar: no ataxia or past pointing w pronation from a traditional standing Romberg and even with challenge maneuvers she did well       Gait: Fluid smooth, no LOB w cadance or directional change  DTR's: Age appropriate,  Plantars: downgoing bilaterally      Lab Results:   I have personally reviewed pertinent reports    , CBC:   Results from last 7 days   Lab Units 05/26/23  0504 05/25/23  0538 05/24/23  1546   HEMATOCRIT % 38 2 36 9 42 2   HEMOGLOBIN g/dL 12 7 12 5 14 7   MCV fL 94 92 90 " PLATELETS Thousands/uL 264 255 301   RBC Million/uL 4 08 4 03 4 71   WBC Thousand/uL 10 23* 9 18 12 08*   , BMP/CMP:   Results from last 7 days   Lab Units 05/26/23  0504 05/25/23  0538 05/24/23  1546   ALK PHOS U/L 75  --  103   ALT U/L 27  --  36   AST U/L 18  --  58*   BUN mg/dL 15 18 17   CALCIUM mg/dL 8 6 9 0 10 1   CHLORIDE mmol/L 102 101 93*   CO2 mmol/L 27 28 23   CREATININE mg/dL 0 69 0 85 0 76   EGFR ml/min/1 73sq m 110 87 99   POTASSIUM mmol/L 3 5 3 3* 4 4   SODIUM mmol/L 138 138 134*   , Vitamin B12:   , HgBA1C:   Results from last 7 days   Lab Units 05/25/23  0538   HEMOGLOBIN A1C % 5 3   , TSH:   Results from last 7 days   Lab Units 05/24/23  1546   TSH 3RD GENERATON uIU/mL 3 385   , Coagulation:   Results from last 7 days   Lab Units 05/24/23  1546   INR  1 02   , Lipid Profile:   Results from last 7 days   Lab Units 05/25/23  0538   HDL mg/dL 45*   LDL CALC mg/dL 122*   TRIGLYCERIDES mg/dL 171*        Imaging Studies: I have personally reviewed pertinent films in PACS and Have reviewed with her at the bedside her MRI  Her films are clean there is no acute ischemia appreciated there is no small vessel disease seen  EEG, Echo, Pathology, and Other Studies: Her echocardiogram done yesterday demonstrates an adequate ejection fraction of 55%  However concerning on her echo was the fact that she was noted to have a small and patent PFO that was confirmed with both provocation as well as some bubbles shunting  Counseling / Coordination of Care  Total time spent today 50 minutes  Greater than 50% of total time was spent with the patient and / or family counseling and / or coordination of care  A description of the counseling / coordination of care: I have discussed with this patient at length the reports of her all of her testing noted above    We also discussed and reviewed information concerning her migraines and her headache history and migraine possible treatments moving forward discussed also with cardiology who is present in the room for part of his exam was her shunt, arrhythmia patch testing and monitoring moving forward as well as follow-up with them  She had several questions I believe they were all answered to her satisfaction at this time

## 2023-05-26 NOTE — ASSESSMENT & PLAN NOTE
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

## 2023-05-26 NOTE — ASSESSMENT & PLAN NOTE
"Presented due to lightheadedness and difficulty finding word  This was precipitated by an aura described as \" feeling unwell\" or \" woozy\"  Lightheadedness described as though she was going to pass out, denies vertigo/room spinning  · Hypertensive on arrival at 160/110  · At time of my exam, symptoms have fully resolved  No facial asymmetry, no difficulty talking, AAOx3, able to ambulate independently without any ataxia  · CT head, CTA head/neck, CTA dissection protocol all without any acute intracranial abnormalities  · Evaluated by neurology who recommended admission with stroke pathway:  · Sx may be related to TIA/CVA vs migraine  · Loaded with aspirin/Plavix, continue with ASA 81 mg daily and Plavix 75mg daily  DAPT X 21 d then asa mono therapy     · Echo - bicuspid AV, pfo present  · MRI brain - no stroke  · Neurochecks  · Telemetry  · Able to ambulate independently-does not require PT/OT evaluation  · Neurology consult  Appreciate recs  Resolved     "

## 2023-05-30 DIAGNOSIS — R29.90 STROKE-LIKE SYMPTOMS: Primary | ICD-10-CM

## 2023-05-30 NOTE — UTILIZATION REVIEW
NOTIFICATION OF ADMISSION DISCHARGE   This is a Notification of Discharge from [address]  Please be advised that this patient has been discharge from our facility  Below you will find the admission and discharge date and time including the patient’s disposition  UTILIZATION REVIEW CONTACT:  Marcie Marks  Utilization   Network Utilization Review Department  Phone: [phone] carefully listen to the prompts  All voicemails are confidential   Email: [email or web address]     ADMISSION INFORMATION  PRESENTATION DATE: 5/24/2023  4:01 PM  OBERVATION ADMISSION DATE:   INPATIENT ADMISSION DATE: 5/24/23  5:47 PM   DISCHARGE DATE: 5/26/2023  1:34 PM   DISPOSITION:Home/Self Care    IMPORTANT INFORMATION:  Send all requests for admission clinical reviews, approved or denied determinations and any other requests to dedicated fax number below belonging to the campus where the patient is receiving treatment   List of dedicated fax numbers:  [address] DENIALS (Administrative/Medical Necessity) [phone]   [address] (Maternity/NICU/Pediatrics) [phone]   [facility] [phone]   [facility] 87 [phone]   [address] [phone]   [address] [facility] [phone]   [address] [place] [address] [phone]   [address] [place] [address] 119 [phone]   [facility]  [phone]   [address] [facility] [phone]   [address] [facility] [address] [facility] [phone]

## 2023-06-08 ENCOUNTER — TELEPHONE (OUTPATIENT)
Dept: NEUROLOGY | Facility: CLINIC | Age: 38
End: 2023-06-08

## 2023-06-08 NOTE — TELEPHONE ENCOUNTER
1ST ATTEMPT,     Called pt no answer, LMOM       HFU/ SL UPPER BUCKS/ STROKE LIKE SYMPTOMS    DC=HOME= 5/26/2023    Quincy Thomson will need follow up in in 6 weeks with general attending or advance practitioner   She will not require outpatient neurological testing

## 2023-06-09 ENCOUNTER — TELEPHONE (OUTPATIENT)
Dept: CARDIOLOGY CLINIC | Facility: CLINIC | Age: 38
End: 2023-06-09

## 2023-06-09 NOTE — TELEPHONE ENCOUNTER
([id number]) and ([phone])    [facility] Angelique [facility] No Authorization is Required per Irving Sorensen (Benefits)  #[id number] at 10:46am (lavonne)

## 2023-07-07 ENCOUNTER — CLINICAL SUPPORT (OUTPATIENT)
Dept: CARDIOLOGY CLINIC | Facility: CLINIC | Age: 38
End: 2023-07-07
Payer: COMMERCIAL

## 2023-07-07 DIAGNOSIS — R29.90 STROKE-LIKE SYMPTOMS: ICD-10-CM

## 2023-07-07 PROCEDURE — 93248 EXT ECG>7D<15D REV&INTERPJ: CPT | Performed by: INTERNAL MEDICINE

## 2023-07-11 NOTE — PROGRESS NOTES
Cardiology Office Follow Up  [facility]  1985  [id number]      ASSESSMENT:  1. Bicuspid aortic valve  a. 5/2023 Echo: LVEF 55%, small PFO with predominant right to left shunting, bicuspid aortic valve with mild regurgitation  2. Symptomatic PVCs and PACs  a. 6/2023 ZIO: Sinus rhythm, rare ventricular and supraventricular ectopy less than 1%, symptoms triggered on monitor correlated with PVCs and brief SVT  b. AV blocking Rx: Toprol-XL 25 twice daily  3. History of PFO  a. On aspirin indefinitely  4. Hypertension  5. Possible history of TIA in May 2023    PLAN/ DISCUSSION:  • She continues to have palpitations on a fairly regular basis almost every single day. We reviewed her recent ZIO monitor and triggered events to seem to correlate with PVCs as well as brief runs of SVT  • We will try increasing Toprol-XL to 37.5 mg twice daily. If symptoms continue can consider adding low-dose diltiazem, or increasing Toprol to 50 twice daily however need to keep a close eye on blood pressure (114/70 today)  • For her bicuspid aortic valve recommend screening echocardiogram every 6-12 months  • She has neurology follow-up later this month and we will arrange a 2-month follow-up with Dr. J Luis Jamison History/ HPI:   This is a 65-year-old female who was hospitalized in May 2023 for strokelike symptoms. MRI of the brain was performed which did not show any stroke and she was diagnosed with a possible TIA. An echo was performed which showed a small PFO and a bicuspid aortic valve. We saw her in consultation at this time. We arranged for a 2-week ambulatory monitor which she did wear and did not show any atrial fibrillation. She is here today for a posthospital follow-up. Since her discharge she continues to have palpitations. These occur on a regular basis. They are most frequent at rest and seem possibly improved with activity.   They are generally short-lived perhaps 20 or 30 seconds but on rare occasions will extend up to 1 minute. They often occur at night. They have been getting gradually worse over the years but over the last few weeks are stable. She will have occasional random chest pressure not necessarily associated with exertion. She has no shortness of breath. She has had 1 episode of syncope in her life which occurred in the setting of COVID infection as well as pregnancy. She will occasionally get some mild swelling in her legs but tells me this will occur when sitting for long periods of time     Vitals:  /70 (BP Location: Left arm, Patient Position: Sitting, Cuff Size: Standard)   Pulse 77   Ht 5' 5" (1.651 m)   Wt 96.2 kg (212 lb)   BMI 35.28 kg/m²      Past Medical History:   Diagnosis Date   • Acid reflux    • Bicuspid aortic valve    • COVID-19 affecting pregnancy in second trimester 12/12/2021   • COVID-19 affecting pregnancy in third trimester 1/20/2022    Hospitalized 12/12-12/19/2021 with Covid pneumonia   • Depression 2004    in 2004 after termination of pregnancy. was tx with zoloft    • Diabetes mellitus ([address])     prediabetic   • Gestational hypertension 2011    third trimester with her 1st baby. del @ 38 weeks   • Gestational hypertension, third trimester 1/27/2022    First pregnancy, IOL for gestational HTN 9/16/2021 baseline LFTs and creatinine normal 10/6/2021 24hr urine collection 147mg  Patient taking ASA 162mg daily - stopped ASA 3/14/2022  Seen L&D 3/12/2022 - one elevated BP, others normal - labs  Normal.  D/c home w/ plan for f/u in office 3/15/2022. • High-risk pregnancy    • HX DVT (deep venous thrombosis) (HCC)     in right axilla -- complication from thoracic outlet syndrome   • Hypertension in pregnancy, preeclampsia, severe, delivered/postpartum 4/6/2022    Patient with gestational HTN in pregnancy and induced for same at 37 weeks. Labs were normal and she continued to have mildly elevated BP after delivery.  PPD#16 she presented with blood pressures 150/100 and a headache for 4 days. She does have h/o migraines. Admitted for severe preeclampsia and started on magnesium. • PCOS (polycystic ovarian syndrome) 2010    initally diagnosed in 2010 tx with metformin. pt d/c med and then restarted in 2016   • Pituitary microadenoma ([address]) 2009    last MRI in 2009   • Prediabetes 2016    'impaired fasting glucose'    • Thoracic outlet syndrome 2004    in 2004 per [facility] records; has surgical repair     Social History     Socioeconomic History   • Marital status: Registered Domestic Partner     Spouse name: Not on file   • Number of children: Not on file   • Years of education: Not on file   • Highest education level: Not on file   Occupational History   • Not on file   Tobacco Use   • Smoking status: Never   • Smokeless tobacco: Never   Vaping Use   • Vaping Use: Never used   Substance and Sexual Activity   • Alcohol use: Yes     Comment: social   • Drug use: Never   • Sexual activity: Yes     Partners: Male     Birth control/protection: OCP   Other Topics Concern   • Not on file   Social History Narrative   • Not on file     Social Determinants of Health     Financial Resource Strain: Not on file   Food Insecurity: No Food Insecurity (5/25/2023)    Hunger Vital Sign    • Worried About Running Out of Food in the Last Year: Never true    • Ran Out of Food in the Last Year: Never true   Transportation Needs: No Transportation Needs (5/25/2023)    PRAPARE - Transportation    • Lack of Transportation (Medical): No    • Lack of Transportation (Non-Medical): No   Physical Activity: Not on file   Stress: Not on file   Social Connections: Not on file   Intimate Partner Violence: Not on file   Housing Stability: Low Risk  (5/25/2023)    Housing Stability Vital Sign    • Unable to Pay for Housing in the Last Year: No    • Number of Places Lived in the Last Year: 1    • Unstable Housing in the Last Year: No      No family history on file.   Past Surgical History: Procedure Laterality Date   • BONE RESECTION, RIB Right 2004   • WISDOM TOOTH EXTRACTION Bilateral        Current Outpatient Medications:   •  aspirin 81 mg chewable tablet, Chew 1 tablet (81 mg total) daily Do not start before May 27, 2023., Disp: 30 tablet, Rfl: 0  •  clopidogrel (PLAVIX) 75 mg tablet, Take 1 tablet (75 mg total) by mouth daily Do not start before May 27, 2023., Disp: 20 tablet, Rfl: 0  •  EPINEPHrine (EPIPEN) 0.3 mg/0.3 mL SOAJ, Inject 0.3 mg into a muscle every 8 (eight) hours as needed for anaphylaxis, Disp: , Rfl:   •  metoprolol succinate (TOPROL-XL) 25 mg 24 hr tablet, Take 1 tablet (25 mg total) by mouth 2 (two) times a day, Disp: 60 tablet, Rfl: 0  •  norethindrone (Ortho Micronor) 0.35 MG tablet, Take 1 tablet (0.35 mg total) by mouth daily, Disp: 28 tablet, Rfl: 12  •  omeprazole (PriLOSEC) 20 mg delayed release capsule, Take 20 mg by mouth, Disp: , Rfl:       Review of Systems:  Review of Systems   Constitutional: Negative for chills and fever. HENT: Negative for ear pain and sore throat. Eyes: Negative for pain and visual disturbance. Respiratory: Negative for cough and shortness of breath. Cardiovascular: Positive for palpitations. Negative for chest pain. Gastrointestinal: Negative for abdominal pain and vomiting. Genitourinary: Negative for dysuria and hematuria. Musculoskeletal: Negative for arthralgias and back pain. Skin: Negative for color change and rash. Neurological: Negative for seizures and syncope. All other systems reviewed and are negative. Physical Exam:  Physical Exam  Constitutional:       General: She is not in acute distress. Appearance: Normal appearance. She is not ill-appearing. HENT:      Head: Normocephalic and atraumatic. Right Ear: External ear normal.      Left Ear: External ear normal.      Mouth/Throat:      Pharynx: No oropharyngeal exudate or posterior oropharyngeal erythema.    Eyes:      General:         Right eye: No discharge. Left eye: No discharge. Conjunctiva/sclera: Conjunctivae normal.      Pupils: Pupils are equal, round, and reactive to light. Cardiovascular:      Rate and Rhythm: Normal rate and regular rhythm. Pulses: Normal pulses. Heart sounds: Normal heart sounds. No murmur heard. No friction rub. No gallop. Pulmonary:      Effort: Pulmonary effort is normal.      Breath sounds: Normal breath sounds. No wheezing, rhonchi or rales. Abdominal:      General: Abdomen is flat. There is no distension. Palpations: Abdomen is soft. Tenderness: There is no abdominal tenderness. Musculoskeletal:         General: No swelling, tenderness or deformity. Normal range of motion. Cervical back: Normal range of motion. Skin:     General: Skin is warm and dry. Neurological:      General: No focal deficit present. Mental Status: She is alert and oriented to person, place, and time. This note was completed in part utilizing M-Modal Fluency Direct Software. Grammatical errors, random word insertions, spelling mistakes, and incomplete sentences can be an occasional consequence of this system secondary to software limitations, ambient noise, and hardware issues. If you have any questions or concerns about the content, text, or information contained within the body of this dictation, please contact the provider for clarification.

## 2023-07-12 ENCOUNTER — OFFICE VISIT (OUTPATIENT)
Dept: CARDIOLOGY CLINIC | Facility: CLINIC | Age: 38
End: 2023-07-12
Payer: COMMERCIAL

## 2023-07-12 VITALS
BODY MASS INDEX: 35.32 KG/M2 | WEIGHT: 212 LBS | HEIGHT: 65 IN | DIASTOLIC BLOOD PRESSURE: 70 MMHG | SYSTOLIC BLOOD PRESSURE: 114 MMHG | HEART RATE: 77 BPM

## 2023-07-12 DIAGNOSIS — I10 HYPERTENSION: ICD-10-CM

## 2023-07-12 DIAGNOSIS — Q23.1 BICUSPID AORTIC VALVE: Primary | ICD-10-CM

## 2023-07-12 DIAGNOSIS — I10 PRIMARY HYPERTENSION: ICD-10-CM

## 2023-07-12 DIAGNOSIS — J96.01 ACUTE RESPIRATORY FAILURE WITH HYPOXIA (HCC): ICD-10-CM

## 2023-07-12 PROCEDURE — 99214 OFFICE O/P EST MOD 30 MIN: CPT | Performed by: PHYSICIAN ASSISTANT

## 2023-07-12 RX ORDER — METOPROLOL SUCCINATE 25 MG/1
37.5 TABLET, EXTENDED RELEASE ORAL 2 TIMES DAILY
Qty: 60 TABLET | Refills: 0
Start: 2023-07-12

## 2023-07-12 RX ORDER — CETIRIZINE HYDROCHLORIDE 10 MG/1
10 TABLET, CHEWABLE ORAL DAILY
COMMUNITY

## 2023-07-12 NOTE — PATIENT INSTRUCTIONS
For the palpitations you can take an extra half or metoprolol in the morning and evening with your regular dose  We will arrange an appointment in 2-3 months with Dr. Pauline Stephens

## 2023-07-31 ENCOUNTER — TELEPHONE (OUTPATIENT)
Dept: NEUROLOGY | Facility: CLINIC | Age: 38
End: 2023-07-31

## 2023-07-31 NOTE — TELEPHONE ENCOUNTER
Called patient to offer a sooner appointment to come in and see Dr. Cleo Gonzalez. Patient accepted change to new appointment.

## 2023-08-09 ENCOUNTER — OFFICE VISIT (OUTPATIENT)
Dept: NEUROLOGY | Facility: CLINIC | Age: 38
End: 2023-08-09
Payer: COMMERCIAL

## 2023-08-09 VITALS
BODY MASS INDEX: 34.16 KG/M2 | WEIGHT: 205 LBS | DIASTOLIC BLOOD PRESSURE: 80 MMHG | HEART RATE: 76 BPM | HEIGHT: 65 IN | SYSTOLIC BLOOD PRESSURE: 130 MMHG

## 2023-08-09 DIAGNOSIS — G43.909 MIGRAINE: ICD-10-CM

## 2023-08-09 DIAGNOSIS — G43.709 CHRONIC MIGRAINE WITHOUT AURA: ICD-10-CM

## 2023-08-09 DIAGNOSIS — I16.1 HYPERTENSIVE EMERGENCY: Primary | ICD-10-CM

## 2023-08-09 PROCEDURE — 96372 THER/PROPH/DIAG INJ SC/IM: CPT | Performed by: PSYCHIATRY & NEUROLOGY

## 2023-08-09 PROCEDURE — 99215 OFFICE O/P EST HI 40 MIN: CPT | Performed by: PSYCHIATRY & NEUROLOGY

## 2023-08-09 RX ORDER — TOPIRAMATE 25 MG/1
25 TABLET ORAL 2 TIMES DAILY
Qty: 60 TABLET | Refills: 3 | Status: SHIPPED | OUTPATIENT
Start: 2023-08-09

## 2023-08-09 RX ORDER — KETOROLAC TROMETHAMINE 30 MG/ML
30 INJECTION, SOLUTION INTRAMUSCULAR; INTRAVENOUS ONCE
Status: COMPLETED | OUTPATIENT
Start: 2023-08-09 | End: 2023-08-09

## 2023-08-09 RX ADMIN — KETOROLAC TROMETHAMINE 30 MG: 30 INJECTION, SOLUTION INTRAMUSCULAR; INTRAVENOUS at 13:09

## 2023-08-09 NOTE — PROGRESS NOTES
Patient ID: Fransisco Mujica is a 43 y.o. female. Assessment/Plan: This is a 44 y/o  Female who is here as a hospital follow-up for hypertensive emergency. Not think this was a TIA, patient has a migraines, and has chronic migraine without aura and needs to be preventative medications. PLAN:      Diagnoses and all orders for this visit:    Hypertensive emergency  -defer patient to PCP   -stop aspirin since this is not a TIA/CVA  -Imaging was negative     Chronic migraine without aura  Preventative management  Medications - start patient on topamax 25mg PO BID  Failed previous medications -  none    Acute management  - will give her toradol 30mg IM  -give her ubrelvy for abortive therapy  -cannot do triptans in light of uncontrolled hypertension,     Imaging  - patient had Mri brain with and without contrast which was unremarkable    Lifestyle Recommendations:  Maintain headache diary. We discussed an JAYLYN for a smart phone is "Migraine brandy"  When patient has a moderate to severe headache, they should seek rest, initiate relaxation and apply cold compresses to the head. Hydration - Please drink at least 64 ounces of water a day to help remain hydrated. Sleep -  Maintain regular sleep schedule. Adults need at least 7-8 hours of uninterrupted a night. Maintain good sleep hygiene:Adults need at least 7-8 hours of uninterrupted a night. Diet - Patient is to have regular frequent meals to prevent headache onset. Avoid dietary trigger. (aged cheese, peanuts, MSG, aspartame and nitrates). Medications - Limit over the counter medications such as Tylenol, Ibuprofen, Aleve, Excedrin. (No more than 3 times a week). Exercise - daily exercise is not only good for your heart but also good for your mental health. Recommend 4-5 times a week for 20 minutes. -     topiramate (Topamax) 25 mg tablet;  Take 1 tablet (25 mg total) by mouth 2 (two) times a day  -     ketorolac (TORADOL) injection 30 mg  -     Ubrogepant (UBRELVY) 100 MG tablet; Take 1 tablet (100 mg) one time as needed for migraine. May repeat one additional tablet (100 mg) at least two hours after the first dose. Do not use more than two doses per day, or for more than eight days per month. Follow up - 3 month     I would be happy to see the patient sooner if any new questions/concerns arise. Patient/Guardian was advised to the call the office if they have any questions and concerns in the meantime. Patient/Guardian does understand that if they have any new stroke like symptoms such as facial droop on one side, weakness/paralysis on either side, speech trouble, numbness on one side, balance issues, any vision changes, extreme dizziness or any new headache, to call 9-1-1 immediately or to proceed to the nearest ER immediately. I have spent a total time of 40 minutes on 08/09/23 in caring for this patient including Risks and benefits of tx options, Instructions for management, Patient and family education, Importance of tx compliance, Counseling / Coordination of care and Documenting in the medical record. Subjective:    HPI    This is a 42-year-old  Female who is here as a hospital follow-up. Patient presented as a possible hypertensive emergency. patient had workup which was negative, MRI brain was negative, and CTA Head and neck without contrast which was negative for any acute stenosis/LVO. BP was elevated. Her symptoms were bilateral, and pressure, and her whole body was fatigue, which was not consistent with TIA, this is more consistent with hypertension. Patient says that she has been having migraines all of her life, started at age 9. Patient says that her headaches are constant, throbbing in nature, bi-temporal, bi-occipital and and cervical, and patient has nausea, vomiting, concentration problems, dizziness, light and sound sensitivity. Never tried any medications, for preventative.  Patient is on nurtec for abortive therapy which seems to be helping. She lays in a dark quiet room when she has a headache. The following portions of the patient's history were reviewed and updated as appropriate:   She  has a past medical history of Acid reflux, Bicuspid aortic valve, COVID-19 affecting pregnancy in second trimester (12/12/2021), COVID-19 affecting pregnancy in third trimester (1/20/2022), Depression (2004), Diabetes mellitus ([address]), Gestational hypertension (2011), Gestational hypertension, third trimester (1/27/2022), High-risk pregnancy, HX DVT (deep venous thrombosis) ([address]), Hypertension in pregnancy, preeclampsia, severe, delivered/postpartum (4/6/2022), PCOS (polycystic ovarian syndrome) (2010), Pituitary microadenoma ([address]) (2009), Prediabetes (2016), and Thoracic outlet syndrome (2004). She   Patient Active Problem List    Diagnosis Date Noted   • Hypertensive emergency 08/09/2023   • Chronic migraine without aura 08/09/2023   • Acute respiratory failure with hypoxia ([address]) 07/12/2023   • SIRS (systemic inflammatory response syndrome) ([address]) 05/25/2023   • Stroke-like symptoms 05/24/2023   • Hypertension 05/24/2023   • Abnormal ECG 05/24/2023   • ASCUS with positive high risk HPV cervical 09/13/2022   • Migraine 03/27/2022   • Postpartum endometritis 03/26/2022   • Iron deficiency anemia secondary to inadequate dietary iron intake 02/11/2022   • Subclinical hypothyroidism 01/27/2022   • Hereditary disease in family possibly affecting fetus 01/20/2022   • Personal history of COVID-19 01/17/2022   • Syncopal episodes 12/12/2021   • Insulin resistance 12/12/2021   • History of DVT (deep vein thrombosis) 08/12/2021   • Bicuspid aortic valve 08/11/2021   • Thoracic outlet syndrome 08/11/2021   • Depression 2004     She  has a past surgical history that includes Bone Resection, Rib (Right, 2004) and [place] tooth extraction (Bilateral). Her family history is not on file. She  reports that she has never smoked.  She has never used smokeless tobacco. She reports current alcohol use. She reports that she does not use drugs. Current Outpatient Medications   Medication Sig Dispense Refill   • cetirizine (ZyrTEC) 10 MG chewable tablet Chew 10 mg daily     • EPINEPHrine (EPIPEN) 0.3 mg/0.3 mL SOAJ Inject 0.3 mg into a muscle every 8 (eight) hours as needed for anaphylaxis     • metoprolol succinate (TOPROL-XL) 25 mg 24 hr tablet Take 1.5 tablets (37.5 mg total) by mouth 2 (two) times a day 60 tablet 0   • norethindrone (Ortho Micronor) 0.35 MG tablet Take 1 tablet (0.35 mg total) by mouth daily 28 tablet 12   • omeprazole (PriLOSEC) 20 mg delayed release capsule Take 20 mg by mouth     • topiramate (Topamax) 25 mg tablet Take 1 tablet (25 mg total) by mouth 2 (two) times a day 60 tablet 3   • Ubrogepant (UBRELVY) 100 MG tablet Take 1 tablet (100 mg) one time as needed for migraine. May repeat one additional tablet (100 mg) at least two hours after the first dose. Do not use more than two doses per day, or for more than eight days per month. 8 tablet 1     No current facility-administered medications for this visit. Current Outpatient Medications on File Prior to Visit   Medication Sig   • cetirizine (ZyrTEC) 10 MG chewable tablet Chew 10 mg daily   • EPINEPHrine (EPIPEN) 0.3 mg/0.3 mL SOAJ Inject 0.3 mg into a muscle every 8 (eight) hours as needed for anaphylaxis   • metoprolol succinate (TOPROL-XL) 25 mg 24 hr tablet Take 1.5 tablets (37.5 mg total) by mouth 2 (two) times a day   • norethindrone (Ortho Micronor) 0.35 MG tablet Take 1 tablet (0.35 mg total) by mouth daily   • omeprazole (PriLOSEC) 20 mg delayed release capsule Take 20 mg by mouth   • [DISCONTINUED] aspirin 81 mg chewable tablet Chew 1 tablet (81 mg total) daily Do not start before May 27, 2023. No current facility-administered medications on file prior to visit.      She is allergic to gadobutrol, nuts - food allergy, shellfish-derived products - food allergy, shrimp extract allergy skin test - food allergy, venlafaxine, gadolinium derivatives, hydromorphone, morphine, and peanut oil - food allergy. .         Objective:    Blood pressure 130/80, pulse 76, height 5' 5" (1.651 m), weight 93 kg (205 lb), not currently breastfeeding. Physical Exam  General - patient is alert   Speech - no dysarthria noted, no aphasia noted. Neuro:   Cranial nerves: PERRL, EOMI, facial sensation intact to soft touch in V1, V2 and V3, no facial asymmetry noted, uvula/palate midline, tongue midline. Motor: 5/5 throughout, normal tone, no pronator drift noted. Sensory - intact to soft touch throughout  Reflexes - 2+ throughout  Coordination - no ataxia/dysmetria noted  Gait - normal     ROS:    Review of Systems   Constitutional: Negative for appetite change, fatigue and fever. HENT: Negative. Negative for hearing loss, tinnitus, trouble swallowing and voice change. Eyes: Negative. Negative for photophobia, pain and visual disturbance. Respiratory: Negative. Negative for shortness of breath. Cardiovascular: Positive for palpitations (patient states having heart palpitations, ). Gastrointestinal: Negative. Negative for nausea and vomiting. Endocrine: Negative. Negative for cold intolerance. Genitourinary: Negative. Negative for dysuria, frequency and urgency. Musculoskeletal: Negative for back pain, gait problem, myalgias and neck pain. Skin: Negative. Negative for rash. Allergic/Immunologic: Negative. Neurological: Positive for numbness (patient states getting tingling and pins and needles from elbows down. ) and headaches (patient states having severe migraines. ). Negative for dizziness, tremors, seizures, syncope, facial asymmetry, speech difficulty, weakness and light-headedness. Hematological: Negative. Does not bruise/bleed easily. Psychiatric/Behavioral: Negative. Negative for confusion, hallucinations and sleep disturbance.

## 2023-09-05 DIAGNOSIS — I10 HYPERTENSION: ICD-10-CM

## 2023-09-05 RX ORDER — METOPROLOL SUCCINATE 25 MG/1
37.5 TABLET, EXTENDED RELEASE ORAL 2 TIMES DAILY
Qty: 270 TABLET | Refills: 0 | Status: SHIPPED | OUTPATIENT
Start: 2023-09-05

## 2023-09-13 ENCOUNTER — OFFICE VISIT (OUTPATIENT)
Dept: CARDIOLOGY CLINIC | Facility: CLINIC | Age: 38
End: 2023-09-13
Payer: COMMERCIAL

## 2023-09-13 VITALS
BODY MASS INDEX: 34.49 KG/M2 | SYSTOLIC BLOOD PRESSURE: 118 MMHG | WEIGHT: 207 LBS | HEART RATE: 68 BPM | HEIGHT: 65 IN | OXYGEN SATURATION: 99 % | DIASTOLIC BLOOD PRESSURE: 78 MMHG

## 2023-09-13 DIAGNOSIS — Q21.12 PFO (PATENT FORAMEN OVALE): ICD-10-CM

## 2023-09-13 DIAGNOSIS — I47.29 NSVT (NONSUSTAINED VENTRICULAR TACHYCARDIA) (HCC): ICD-10-CM

## 2023-09-13 DIAGNOSIS — I10 PRIMARY HYPERTENSION: Primary | ICD-10-CM

## 2023-09-13 DIAGNOSIS — I49.3 PVC (PREMATURE VENTRICULAR CONTRACTION): ICD-10-CM

## 2023-09-13 DIAGNOSIS — I47.1 SVT (SUPRAVENTRICULAR TACHYCARDIA): ICD-10-CM

## 2023-09-13 DIAGNOSIS — Q23.1 BICUSPID AORTIC VALVE: ICD-10-CM

## 2023-09-13 PROBLEM — I47.10 SVT (SUPRAVENTRICULAR TACHYCARDIA): Status: ACTIVE | Noted: 2023-09-13

## 2023-09-13 PROCEDURE — 99214 OFFICE O/P EST MOD 30 MIN: CPT | Performed by: INTERNAL MEDICINE

## 2023-09-13 NOTE — PROGRESS NOTES
Cardiology   [facility] [place]erto 43 y.o. female MRN: [id number]        Impression:  1. Bicuspid aortic valve - mild AI  2. NSVT - no significant episodes. 3. Hypertension - controlled. 4. PFO - on aspirin. Recommendations:  1. Continue current medications. 2. OK to start Keto diet. 3. Follow up in 6 months - will check echo and lipid profile after next visit. HPI: Pratibha Bedolla is a 43y.o. year old female with bicuspid aortic valve, symptomatic PACs/PVCs, PFO on asa, and hypertension who returns for follow up. Saw Casandra Perez PA-C in /23 - Zio monitor demonstrated PVCs/brieft SVT and metoprolol succinate increased. Echo 5/23 - EF 55%, bicuspid aortic valve with mild AI, and small PFO. Feels well. No chest pain, shortness of breath, or palpitations. Review of Systems   Constitutional: Negative. HENT: Negative. Eyes: Negative. Respiratory: Negative for chest tightness and shortness of breath. Cardiovascular: Negative for chest pain, palpitations and leg swelling. Gastrointestinal: Negative. Endocrine: Negative. Genitourinary: Negative. Musculoskeletal: Negative. Skin: Negative. Allergic/Immunologic: Negative. Neurological: Negative. Hematological: Negative. Psychiatric/Behavioral: Negative. All other systems reviewed and are negative. Past Medical History:   Diagnosis Date   • Acid reflux    • Bicuspid aortic valve    • COVID-19 affecting pregnancy in second trimester 12/12/2021   • COVID-19 affecting pregnancy in third trimester 1/20/2022    Hospitalized 12/12-12/19/2021 with Covid pneumonia   • Depression 2004    in 2004 after termination of pregnancy. was tx with zoloft    • Diabetes mellitus ([address])     prediabetic   • Gestational hypertension 2011    third trimester with her 1st baby.  del @ 38 weeks   • Gestational hypertension, third trimester 1/27/2022    First pregnancy, IOL for gestational HTN 9/16/2021 baseline LFTs and creatinine normal 10/6/2021 24hr urine collection 147mg  Patient taking ASA 162mg daily - stopped ASA 3/14/2022  Seen L&D 3/12/2022 - one elevated BP, others normal - labs  Normal.  D/c home w/ plan for f/u in office 3/15/2022. • High-risk pregnancy    • HX DVT (deep venous thrombosis) (HCC)     in right axilla -- complication from thoracic outlet syndrome   • Hypertension in pregnancy, preeclampsia, severe, delivered/postpartum 4/6/2022    Patient with gestational HTN in pregnancy and induced for same at 37 weeks. Labs were normal and she continued to have mildly elevated BP after delivery. PPD#16 she presented with blood pressures 150/100 and a headache for 4 days. She does have h/o migraines. Admitted for severe preeclampsia and started on magnesium. • PCOS (polycystic ovarian syndrome) 2010    initally diagnosed in 2010 tx with metformin. pt d/c med and then restarted in 2016   • Pituitary microadenoma ([address]) 2009    last MRI in 2009   • Prediabetes 2016    'impaired fasting glucose'    • Thoracic outlet syndrome 2004    in 2004 [facility]; has surgical repair     Past Surgical History:   Procedure Laterality Date   • BONE RESECTION, RIB Right 2004   • WISDOM TOOTH EXTRACTION Bilateral      Social History     Substance and Sexual Activity   Alcohol Use Yes    Comment: social     Social History     Substance and Sexual Activity   Drug Use Never     Social History     Tobacco Use   Smoking Status Never   Smokeless Tobacco Never     No family history on file. Allergies:   Allergies   Allergen Reactions   • Gadobutrol Cough and Itching   • Nuts - Food Allergy Anaphylaxis   • Shellfish-Derived Products - Food Allergy Anaphylaxis   • Shrimp Extract Allergy Skin Test - Food Allergy Headache   • Venlafaxine Anaphylaxis     Per [facility] records   • Gadolinium Derivatives Other (See Comments)     Pt sts allergy to a contrast in MRI years ago per pt (originally documented as a CT contrast- pt had CT contrast 5/24/23 without incident)   • Hydromorphone Itching     During labor had reaction   • Morphine Itching   • Peanut Oil - Food Allergy Other (See Comments)     Itching, trouble breathing       Medications:     Current Outpatient Medications:   •  cetirizine (ZyrTEC) 10 MG chewable tablet, Chew 10 mg daily, Disp: , Rfl:   •  EPINEPHrine (EPIPEN) 0.3 mg/0.3 mL SOAJ, Inject 0.3 mg into a muscle every 8 (eight) hours as needed for anaphylaxis, Disp: , Rfl:   •  metoprolol succinate (TOPROL-XL) 25 mg 24 hr tablet, Take 1.5 tablets (37.5 mg total) by mouth 2 (two) times a day, Disp: 270 tablet, Rfl: 0  •  norethindrone (Ortho Micronor) 0.35 MG tablet, Take 1 tablet (0.35 mg total) by mouth daily, Disp: 28 tablet, Rfl: 12  •  omeprazole (PriLOSEC) 20 mg delayed release capsule, Take 20 mg by mouth, Disp: , Rfl:   •  Ubrogepant (UBRELVY) 100 MG tablet, Take 1 tablet (100 mg) one time as needed for migraine. May repeat one additional tablet (100 mg) at least two hours after the first dose. Do not use more than two doses per day, or for more than eight days per month., Disp: 8 tablet, Rfl: 1      Wt Readings from Last 3 Encounters:   09/13/23 93.9 kg (207 lb)   08/09/23 93 kg (205 lb)   07/12/23 96.2 kg (212 lb)     Temp Readings from Last 3 Encounters:   05/26/23 98.2 °F (36.8 °C)   04/08/22 98.8 °F (37.1 °C) (Temporal)   03/28/22 97.9 °F (36.6 °C) (Temporal)     BP Readings from Last 3 Encounters:   09/13/23 118/78   08/09/23 130/80   07/12/23 114/70     Pulse Readings from Last 3 Encounters:   09/13/23 68   08/09/23 76   07/12/23 77         Physical Exam  HENT:      Head: Atraumatic. Mouth/Throat:      Mouth: Mucous membranes are moist.   Eyes:      Extraocular Movements: Extraocular movements intact. Cardiovascular:      Rate and Rhythm: Normal rate and regular rhythm. Heart sounds: Normal heart sounds.    Pulmonary:      Effort: Pulmonary effort is normal.      Breath sounds: Normal breath sounds. Abdominal:      General: Abdomen is flat. Musculoskeletal:         General: Normal range of motion. Cervical back: Normal range of motion. Skin:     General: Skin is warm. Neurological:      General: No focal deficit present. Mental Status: She is alert and oriented to person, place, and time.    Psychiatric:         Mood and Affect: Mood normal.         Behavior: Behavior normal.           Laboratory Studies:  CMP:  Lab Results   Component Value Date    K 3.5 05/26/2023     05/26/2023    CO2 27 05/26/2023    BUN 15 05/26/2023    CREATININE 0.69 05/26/2023    AST 18 05/26/2023    ALT 27 05/26/2023    EGFR 110 05/26/2023       Lipid Profile:   No results found for: "CHOL"  Lab Results   Component Value Date    HDL 45 (L) 05/25/2023     Lab Results   Component Value Date    LDLCALC 122 (H) 05/25/2023     Lab Results   Component Value Date    TRIG 171 (H) 05/25/2023

## 2023-09-13 NOTE — PATIENT INSTRUCTIONS
Recommendations:  1. Continue current medications. 2. OK to start Keto diet. 3. Follow up in 6 months - will check echo and lipid profile after next visit.

## 2023-09-14 ENCOUNTER — TELEPHONE (OUTPATIENT)
Dept: OBGYN CLINIC | Facility: CLINIC | Age: 38
End: 2023-09-14

## 2023-09-14 RX ORDER — NORETHINDRONE 0.35 MG/1
1 TABLET ORAL DAILY
Qty: 28 TABLET | Refills: 2 | Status: SHIPPED | OUTPATIENT
Start: 2023-09-14

## 2023-09-14 NOTE — TELEPHONE ENCOUNTER
Pt left a message requesting a Rx refill. Pt has a WA scheduled for 11/22/23.   Rx: Ortho Micronor 0.35 mg  Pharm: [facility] pharmacy on file     Please review for Rx refill request.

## 2023-11-07 ENCOUNTER — TELEPHONE (OUTPATIENT)
Dept: NEUROLOGY | Facility: CLINIC | Age: 38
End: 2023-11-07

## 2023-11-22 ENCOUNTER — ANNUAL EXAM (OUTPATIENT)
Dept: OBGYN CLINIC | Facility: CLINIC | Age: 38
End: 2023-11-22
Payer: COMMERCIAL

## 2023-11-22 VITALS
HEIGHT: 65 IN | DIASTOLIC BLOOD PRESSURE: 80 MMHG | SYSTOLIC BLOOD PRESSURE: 120 MMHG | BODY MASS INDEX: 34.66 KG/M2 | WEIGHT: 208 LBS

## 2023-11-22 DIAGNOSIS — Z12.31 ENCOUNTER FOR SCREENING MAMMOGRAM FOR MALIGNANT NEOPLASM OF BREAST: ICD-10-CM

## 2023-11-22 DIAGNOSIS — Z30.41 ENCOUNTER FOR SURVEILLANCE OF CONTRACEPTIVE PILLS: ICD-10-CM

## 2023-11-22 DIAGNOSIS — Z01.419 ROUTINE GYNECOLOGICAL EXAMINATION: Primary | ICD-10-CM

## 2023-11-22 DIAGNOSIS — Z12.4 SCREENING FOR MALIGNANT NEOPLASM OF THE CERVIX: ICD-10-CM

## 2023-11-22 PROCEDURE — S0612 ANNUAL GYNECOLOGICAL EXAMINA: HCPCS | Performed by: OBSTETRICS & GYNECOLOGY

## 2023-11-22 RX ORDER — ALBUTEROL SULFATE 90 UG/1
AEROSOL, METERED RESPIRATORY (INHALATION)
COMMUNITY
Start: 2023-11-09

## 2023-11-22 RX ORDER — ACETAMINOPHEN AND CODEINE PHOSPHATE 120; 12 MG/5ML; MG/5ML
1 SOLUTION ORAL DAILY
Qty: 28 TABLET | Refills: 13 | Status: SHIPPED | OUTPATIENT
Start: 2023-11-22

## 2023-11-22 NOTE — PROGRESS NOTES
[address]  [address] [place] [address], [address], [facility], [address],    ASSESSMENT/PLAN: Sacha Meredith is a 43 y.o. [id number] who presents for annual gynecologic exam.    Encounter for routine gynecologic examination  - Routine well woman exam completed today. - Cervical Cancer Screening: Current ASCCP Guidelines reviewed. Last Pap: 08/17/2022 . Next Pap Due: today due to ascus/HR HPV 2022 - negative colpo  - HPV Vaccination status: Not immunized  - Contraceptive counseling discussed. Current contraception: progesterone only pills  - Breast Cancer Screening: Last Mammogram Not on file  - Colorectal cancer screening was not ordered. - The following were reviewed in today's visit: breast self exam and use and side effects of OCPs. Plans on vasectomy. Interested in combination Ocs but due to HTN and migraine with aura this is not recommended. Discussed Mirena as an option if vasectomy is not done or if her bleeding becomes problematic for her    Additional problems addressed during this visit:  1. Encounter for screening mammogram for malignant neoplasm of breast  -     Mammo screening bilateral w 3d & cad; Future    2. Chronic migraine without aura    3. Routine postpartum follow-up  -     norethindrone (Incassia) 0.35 MG tablet; Take 1 tablet (0.35 mg total) by mouth daily        CC:  Annual Gynecologic Examination    HPI: Sacha Meredith is a 43 y.o. [id number] who presents for annual gynecologic examination.   HPI    The following portions of the patient's history were reviewed and updated as appropriate: She  has a past medical history of Acid reflux, Bicuspid aortic valve, COVID-19 affecting pregnancy in second trimester (12/12/2021), COVID-19 affecting pregnancy in third trimester (1/20/2022), Depression (2004), Diabetes mellitus ([address]), Gestational hypertension (2011), Gestational hypertension, third trimester (1/27/2022), High-risk pregnancy, HX DVT (deep venous thrombosis) ([address]), Hypertension in pregnancy, preeclampsia, severe, delivered/postpartum (4/6/2022), PCOS (polycystic ovarian syndrome) (2010), Pituitary microadenoma ([address]) (2009), Prediabetes (2016), and Thoracic outlet syndrome (2004). She  has a past surgical history that includes Bone Resection, Rib (Right, 2004) and [place] tooth extraction (Bilateral). Her family history is not on file. She  reports that she has never smoked. She has never been exposed to tobacco smoke. She has never used smokeless tobacco. She reports current alcohol use. She reports that she does not use drugs. Current Outpatient Medications   Medication Sig Dispense Refill    albuterol (PROVENTIL HFA,VENTOLIN HFA) 90 mcg/act inhaler INHALE 1 PUFF EVERY 2-3 HOURS AS NEEDED FOR WHEEZING      cetirizine (ZyrTEC) 10 MG chewable tablet Chew 10 mg daily      EPINEPHrine (EPIPEN) 0.3 mg/0.3 mL SOAJ Inject 0.3 mg into a muscle every 8 (eight) hours as needed for anaphylaxis      metoprolol succinate (TOPROL-XL) 25 mg 24 hr tablet Take 1.5 tablets (37.5 mg total) by mouth 2 (two) times a day 270 tablet 0    norethindrone (Incassia) 0.35 MG tablet Take 1 tablet (0.35 mg total) by mouth daily 28 tablet 13    omeprazole (PriLOSEC) 20 mg delayed release capsule Take 20 mg by mouth      Ubrogepant (UBRELVY) 100 MG tablet Take 1 tablet (100 mg) one time as needed for migraine. May repeat one additional tablet (100 mg) at least two hours after the first dose. Do not use more than two doses per day, or for more than eight days per month. 8 tablet 1     No current facility-administered medications for this visit. She is allergic to gadobutrol, nuts - food allergy, shellfish-derived products - food allergy, shrimp extract allergy skin test - food allergy, venlafaxine, gadolinium derivatives, hydromorphone, morphine, and peanut oil - food allergy. .    Review of Systems      Objective:  /80 (BP Location: Left arm, Patient Position: Sitting, Cuff Size: Standard)   Ht 5' 5" (1.651 m)   Wt 94.3 kg (208 lb)   LMP 11/15/2023   BMI 34.61 kg/m²    Physical Exam      PE:  General Appearance: alert and oriented, in no acute distress. HEENT: PERRL, thyroid without masses or tenderness  Breast: No masses, tenderness, skin changes, nipple D/C or axillary or supraclavicular adenopathy  Abdomen: Soft, non-tender, non-distended, no masses, no rebound or guarding. Pelvic:       External genitalia: Normal appearance, no abnormal pigmentation, no lesions or masses. Normal [place]'s and [place]'s. Urinary system: Urethral meatus normal, bladder non-tender. Vaginal: normal mucosa without prolapse or lesions. Normal-appearing physiologic discharge      Cervix: Normal-appearing, well-epithelialized, no gross lesions or masses No cervical motion tenderness. Adnexa: No adnexal masses or tenderness noted. Uterus: Normal-sized, regular contour, midline, mobile, no uterine tenderness. Extremities: Normal range of motion.    Skin: normal, no rash or abnormalities  Neurologic: alert, oriented x3  Psychiatric: Appropriate affect, mood stable, cooperative with exam.

## 2023-12-01 LAB
CYTOLOGIST CVX/VAG CYTO: ABNORMAL
DX ICD CODE: ABNORMAL
HPV GENOTYPE REFLEX: ABNORMAL
HPV I/H RISK 4 DNA CVX QL PROBE+SIG AMP: POSITIVE
HPV16 DNA CVX QL PROBE+SIG AMP: NEGATIVE
HPV18+45 E6+E7 MRNA CVX QL NAA+PROBE: NEGATIVE
Lab: ABNORMAL
OTHER STN SPEC: ABNORMAL
PATH REPORT.FINAL DX SPEC: ABNORMAL
SL AMB NOTE:: ABNORMAL
SL AMB SPECIMEN ADEQUACY: ABNORMAL
SL AMB TEST METHODOLOGY: ABNORMAL

## 2023-12-16 DIAGNOSIS — Z30.41 ENCOUNTER FOR SURVEILLANCE OF CONTRACEPTIVE PILLS: ICD-10-CM

## 2023-12-19 ENCOUNTER — TELEPHONE (OUTPATIENT)
Dept: CARDIOLOGY CLINIC | Facility: CLINIC | Age: 38
End: 2023-12-19

## 2023-12-19 NOTE — TELEPHONE ENCOUNTER
Since pt refilled Metoprolol she has been having elevated pressures and palpitations  She stated there was a warning on the bottle stating the med's were from a different  and didn't know if that had something to do with it?  She just picked up a new refill bottle yesterday and it is back to the original  and coincidently is feeling better so far. She will continue to monitor and call back if symptoms return    BP was averaging 174/100, HR 80's with palpitations all day.   She denies any dizziness, lightheadedness, chest discomfort or SOB.

## 2024-02-05 DIAGNOSIS — I10 HYPERTENSION: ICD-10-CM

## 2024-02-05 RX ORDER — METOPROLOL SUCCINATE 25 MG/1
37.5 TABLET, EXTENDED RELEASE ORAL 2 TIMES DAILY
Qty: 270 TABLET | Refills: 0 | Status: SHIPPED | OUTPATIENT
Start: 2024-02-05

## 2024-03-15 RX ORDER — NORETHINDRONE 0.35 MG/1
1 TABLET ORAL DAILY
Qty: 28 TABLET | Refills: 2 | OUTPATIENT
Start: 2024-03-15

## 2024-06-27 DIAGNOSIS — I10 HYPERTENSION: ICD-10-CM

## 2024-06-27 RX ORDER — METOPROLOL SUCCINATE 25 MG/1
TABLET, EXTENDED RELEASE ORAL
Qty: 90 TABLET | Refills: 0 | Status: SHIPPED | OUTPATIENT
Start: 2024-06-27

## 2024-07-19 ENCOUNTER — OFFICE VISIT (OUTPATIENT)
Dept: CARDIOLOGY CLINIC | Facility: CLINIC | Age: 39
End: 2024-07-19
Payer: COMMERCIAL

## 2024-07-19 VITALS
WEIGHT: 212 LBS | SYSTOLIC BLOOD PRESSURE: 120 MMHG | BODY MASS INDEX: 35.32 KG/M2 | HEIGHT: 65 IN | HEART RATE: 67 BPM | DIASTOLIC BLOOD PRESSURE: 82 MMHG

## 2024-07-19 DIAGNOSIS — J96.01 ACUTE RESPIRATORY FAILURE WITH HYPOXIA (HCC): ICD-10-CM

## 2024-07-19 DIAGNOSIS — Q23.1 BICUSPID AORTIC VALVE: ICD-10-CM

## 2024-07-19 DIAGNOSIS — I47.10 SVT (SUPRAVENTRICULAR TACHYCARDIA): Primary | ICD-10-CM

## 2024-07-19 DIAGNOSIS — I47.29 NSVT (NONSUSTAINED VENTRICULAR TACHYCARDIA) (HCC): ICD-10-CM

## 2024-07-19 DIAGNOSIS — E78.2 MIXED HYPERLIPIDEMIA: ICD-10-CM

## 2024-07-19 PROCEDURE — 99214 OFFICE O/P EST MOD 30 MIN: CPT | Performed by: PHYSICIAN ASSISTANT

## 2024-07-19 PROCEDURE — 93000 ELECTROCARDIOGRAM COMPLETE: CPT | Performed by: PHYSICIAN ASSISTANT

## 2024-07-19 RX ORDER — DILTIAZEM HYDROCHLORIDE 180 MG/1
180 CAPSULE, COATED, EXTENDED RELEASE ORAL DAILY
Qty: 30 CAPSULE | Refills: 0 | Status: SHIPPED | OUTPATIENT
Start: 2024-07-19

## 2024-07-19 NOTE — PROGRESS NOTES
Cardiology Office Follow Up  Farhana Thomson  1985  [id number]      ASSESSMENT:  Bicuspid aortic valve  5/2023 Echo: LVEF 55%, small PFO with predominant right to left shunting, bicuspid aortic valve with mild regurgitation  Symptomatic PVCs and PACs  6/2023 ZIO: Sinus rhythm, rare ventricular and supraventricular ectopy less than 1%, symptoms triggered on monitor correlated with PVCs and brief SVT  AV blocking Rx: Toprol-XL 25 twice daily  History of PFO  On aspirin indefinitely  Hypertension  Possible history of TIA in May 2023    PLAN:  Repeat routine echo to assess AV  Continues to have palpitations on occasion, has not historically responded to uptitration of her Toprol XL. Will trial switching to Cardizem CD 180mg. Advised to call us if she is having any issues on new Rx.   Maintain BP readings at home. Report any SBP <100.   Check FLP; not currently on statin therapy  Has some LE edema worse during the evening better in the AM. Advised on conservative measures including salt restrictions, leg compression and elevation at night.  Exercise to tolerance  Continue ASA therapy  RTO in 6-12 months or sooner if needed. Will call with any significant findings on Echo in the meantime.     Interval History/ HPI:   Farhana Thomson is a 39 year old female with history as above. Follows with Dr Byrnes. Here for routine cardiology follow up.   Doing reasonably well CV wise. She does have almost daily palpitations. Prior cardiac monitors show symptoms correlate with runs of PACs and SVT. She takes Toprol XL 37.5mg BID which is mildly helpful. She does skip evening dose at times due to taking care of her father who is ill and her daughter.   Constantly active and on her feet. No exertional limitations or symptoms other than her palpitations which occur randomly.    Inoffice EKG: Sinus rhythm, twi in AVF      Past Medical History:   Diagnosis Date    Acid reflux     Bicuspid aortic valve     COVID-19  affecting pregnancy in second trimester 12/12/2021    COVID-19 affecting pregnancy in third trimester 1/20/2022    Hospitalized 12/12-12/19/2021 with Covid pneumonia    Depression 2004    in 2004 after termination of pregnancy. was tx with zoloft     Diabetes mellitus (HCC)     prediabetic    Gestational hypertension 2011    third trimester with her 1st baby. del @ 38 weeks    Gestational hypertension, third trimester 1/27/2022    First pregnancy, IOL for gestational HTN 9/16/2021 baseline LFTs and creatinine normal 10/6/2021 24hr urine collection 147mg  Patient taking ASA 162mg daily - stopped ASA 3/14/2022  Seen L&D 3/12/2022 - one elevated BP, others normal - labs  Normal.  D/c home w/ plan for f/u in office 3/15/2022.    High-risk pregnancy     HX DVT (deep venous thrombosis) (HCC)     in right axilla -- complication from thoracic outlet syndrome    Hypertension in pregnancy, preeclampsia, severe, delivered/postpartum 4/6/2022    Patient with gestational HTN in pregnancy and induced for same at 37 weeks. Labs were normal and she continued to have mildly elevated BP after delivery. PPD#16 she presented with blood pressures 150/100 and a headache for 4 days.  She does have h/o migraines. Admitted for severe preeclampsia and started on magnesium.    PCOS (polycystic ovarian syndrome) 2010    initally diagnosed in 2010 tx with metformin. pt d/c med and then restarted in 2016    Pituitary microadenoma (HCC) 2009    last MRI in 2009    Prediabetes 2016    'impaired fasting glucose'     Thoracic outlet syndrome 2004    in 2004 per [facility] records; has surgical repair     Social History     Socioeconomic History    Marital status: Registered Domestic Partner     Spouse name: Not on file    Number of children: Not on file    Years of education: Not on file    Highest education level: Not on file   Occupational History    Not on file   Tobacco Use    Smoking status: Never     Passive exposure: Never    Smokeless  tobacco: Never   Vaping Use    Vaping status: Never Used   Substance and Sexual Activity    Alcohol use: Yes     Comment: social    Drug use: Never    Sexual activity: Yes     Partners: Male     Birth control/protection: OCP   Other Topics Concern    Not on file   Social History Narrative    Not on file     Social Determinants of Health     Financial Resource Strain: Not on file   Food Insecurity: No Food Insecurity (5/25/2023)    Hunger Vital Sign     Worried About Running Out of Food in the Last Year: Never true     Ran Out of Food in the Last Year: Never true   Transportation Needs: No Transportation Needs (5/25/2023)    PRAPARE - Transportation     Lack of Transportation (Medical): No     Lack of Transportation (Non-Medical): No   Physical Activity: Not on file   Stress: Not on file   Social Connections: Not on file   Intimate Partner Violence: Not on file   Housing Stability: Low Risk  (5/25/2023)    Housing Stability Vital Sign     Unable to Pay for Housing in the Last Year: No     Number of Places Lived in the Last Year: 1     Unstable Housing in the Last Year: No      No family history on file.  Past Surgical History:   Procedure Laterality Date    BONE RESECTION, RIB Right 2004    WISDOM TOOTH EXTRACTION Bilateral        Current Outpatient Medications:     albuterol (PROVENTIL HFA,VENTOLIN HFA) 90 mcg/act inhaler, INHALE 1 PUFF EVERY 2-3 HOURS AS NEEDED FOR WHEEZING, Disp: , Rfl:     cetirizine (ZyrTEC) 10 MG chewable tablet, Chew 10 mg daily, Disp: , Rfl:     EPINEPHrine (EPIPEN) 0.3 mg/0.3 mL SOAJ, Inject 0.3 mg into a muscle every 8 (eight) hours as needed for anaphylaxis, Disp: , Rfl:     metoprolol succinate (TOPROL-XL) 25 mg 24 hr tablet, TAKE 1 AND 1/2 TABLETS BY MOUTH 2 TIMES A DAY **DR. GILMORE'S MFR**, Disp: 90 tablet, Rfl: 0    norethindrone (Incassia) 0.35 MG tablet, Take 1 tablet (0.35 mg total) by mouth daily, Disp: 28 tablet, Rfl: 13    omeprazole (PriLOSEC) 20 mg delayed release capsule, Take  20 mg by mouth, Disp: , Rfl:     Ubrogepant (UBRELVY) 100 MG tablet, Take 1 tablet (100 mg) one time as needed for migraine. May repeat one additional tablet (100 mg) at least two hours after the first dose. Do not use more than two doses per day, or for more than eight days per month., Disp: 8 tablet, Rfl: 1    Review of Systems:  Review of Systems   Constitutional:  Negative for appetite change, chills, diaphoresis, fatigue and fever.   Respiratory:  Negative for cough, chest tightness and shortness of breath.    Cardiovascular:  Positive for palpitations. Negative for chest pain and leg swelling.   Gastrointestinal:  Negative for diarrhea, nausea and vomiting.   Endocrine: Negative for cold intolerance and heat intolerance.   Genitourinary:  Negative for difficulty urinating, dysuria and enuresis.   Musculoskeletal:  Negative for arthralgias, back pain and gait problem.   Allergic/Immunologic: Negative for environmental allergies and food allergies.   Neurological:  Negative for dizziness, facial asymmetry and headaches.   Hematological:  Negative for adenopathy. Does not bruise/bleed easily.   Psychiatric/Behavioral:  Negative for agitation, behavioral problems and confusion.      Physical Exam:  Physical Exam  Constitutional:       Appearance: She is well-developed.   HENT:      Right Ear: External ear normal.      Left Ear: External ear normal.   Eyes:      Extraocular Movements: EOM normal.      Pupils: Pupils are equal, round, and reactive to light.   Cardiovascular:      Rate and Rhythm: Normal rate and regular rhythm.      Heart sounds: Normal heart sounds. No murmur heard.     No friction rub. No gallop.   Pulmonary:      Effort: Pulmonary effort is normal.      Breath sounds: Normal breath sounds.   Abdominal:      Palpations: Abdomen is soft.   Musculoskeletal:         General: Normal range of motion.      Cervical back: Normal range of motion.   Skin:     General: Skin is warm and dry.   Neurological:       Mental Status: She is alert and oriented to person, place, and time.      Deep Tendon Reflexes: Reflexes are normal and symmetric.   Psychiatric:         Mood and Affect: Mood and affect normal.         Behavior: Behavior normal.         Thought Content: Thought content normal.         Judgment: Judgment normal.         This note was completed in part utilizing M-Modal Fluency Direct Software.  Grammatical errors, random word insertions, spelling mistakes, and incomplete sentences can be an occasional consequence of this system secondary to software limitations, ambient noise, and hardware issues.  If you have any questions or concerns about the content, text, or information contained within the body of this dictation, please contact the provider for clarification.

## 2024-08-07 ENCOUNTER — HOSPITAL ENCOUNTER (OUTPATIENT)
Dept: MAMMOGRAPHY | Facility: CLINIC | Age: 39
Discharge: HOME | End: 2024-08-07
Payer: COMMERCIAL

## 2024-08-07 VITALS — WEIGHT: 212 LBS | HEIGHT: 65 IN | BODY MASS INDEX: 35.32 KG/M2

## 2024-08-07 DIAGNOSIS — Z12.31 ENCOUNTER FOR SCREENING MAMMOGRAM FOR MALIGNANT NEOPLASM OF BREAST: ICD-10-CM

## 2024-08-07 PROCEDURE — 77067 SCR MAMMO BI INCL CAD: CPT

## 2024-08-07 PROCEDURE — 77063 BREAST TOMOSYNTHESIS BI: CPT

## 2024-08-12 DIAGNOSIS — I10 HYPERTENSION: ICD-10-CM

## 2024-08-12 RX ORDER — METOPROLOL SUCCINATE 25 MG/1
75 TABLET, EXTENDED RELEASE ORAL 2 TIMES DAILY
Qty: 270 TABLET | Refills: 4 | Status: SHIPPED | OUTPATIENT
Start: 2024-08-12

## 2024-08-16 DIAGNOSIS — I47.10 SVT (SUPRAVENTRICULAR TACHYCARDIA): ICD-10-CM

## 2024-08-16 DIAGNOSIS — I47.29 NSVT (NONSUSTAINED VENTRICULAR TACHYCARDIA) (HCC): ICD-10-CM

## 2024-08-16 RX ORDER — DILTIAZEM HYDROCHLORIDE 180 MG/1
180 CAPSULE, COATED, EXTENDED RELEASE ORAL DAILY
Qty: 90 CAPSULE | Refills: 1 | Status: SHIPPED | OUTPATIENT
Start: 2024-08-16 | End: 2024-08-20 | Stop reason: SINTOL

## 2024-08-16 NOTE — TELEPHONE ENCOUNTER
Requested medication(s) are due for refill today: Yes  Patient has already received a courtesy refill: No  Other reason request has been forwarded to provider: see pod note.

## 2024-08-20 ENCOUNTER — NURSE TRIAGE (OUTPATIENT)
Age: 39
End: 2024-08-20

## 2024-08-20 NOTE — TELEPHONE ENCOUNTER
"Chief Complaint: \"I don't feel right\" since starting Diltiazem    History of Present Illness (HPI): Started Diltiazem 180mg daily on Sunday for SVT; ~1hr later she started to not feel right: like in a fog or drugged, reaction time feels slow, very sleepy, has to take a deep breath at times, headache. Symptoms have occurred each day and don't start to subside until evening.    VS/Weight: 128/84,  83    Pain: No    Risk Factors: Hypertension and Other PFO    Recent Testing: Labs and Other EKG; has ECHO scheduled for August 23rd    Medicine: Diltiazem 180mg daily    Upcoming Office Visit: No    Last Office Visit: July 19th    Additional Comments: please advise regarding symptoms; wanted to wait for recommendations prior to scheduling an appointment due to family commitments. Advised stay hydrated.    [phone]         Reason for Disposition   Taking a medicine that could cause dizziness (e.g., blood pressure medications, diuretics)     Started Diltiazem on Sunday for SVT, symptoms started shortly after it.    Answer Assessment - Initial Assessment Questions  1. DESCRIPTION: \"Describe your dizziness.\"      Feel like Im in a fog, lasts ~12hrs  2. LIGHTHEADED: \"Do you feel lightheaded?\" (e.g., somewhat faint, woozy, weak upon standing)      Lightheaded; worse with exertion (climbing stairs)  3. VERTIGO: \"Do you feel like either you or the room is spinning or tilting?\" (i.e. vertigo)      denies  4. SEVERITY: \"How bad is it?\"  \"Do you feel like you are going to faint?\" \"Can you stand and walk?\"    - MILD: Feels slightly dizzy, but walking normally.    - MODERATE: Feels very unsteady when walking, but not falling; interferes with normal activities (e.g., school, work) .    - SEVERE: Unable to walk without falling, or requires assistance to walk without falling; feels like passing out now.       moderate  5. ONSET:  \"When did the dizziness begin?\"      Sunday after starting Diltiazem for first time  6. AGGRAVATING " "FACTORS: \"Does anything make it worse?\" (e.g., standing, change in head position)      exertion  7. HEART RATE: \"Can you tell me your heart rate?\" \"How many beats in 15 seconds?\"  (Note: not all patients can do this)        83  8. CAUSE: \"What do you think is causing the dizziness?\"      Diltiazem  9. RECURRENT SYMPTOM: \"Have you had dizziness before?\" If Yes, ask: \"When was the last time?\" \"What happened that time?\"      denies  10. OTHER SYMPTOMS: \"Do you have any other symptoms?\" (e.g., fever, chest pain, vomiting, diarrhea, bleeding)        \"Catch my breath, like take a deep breath\"  11. PREGNANCY: \"Is there any chance you are pregnant?\" \"When was your last menstrual period?\"        denies     Started Diltiazem 180mg daily on Sunday for SVT, symptoms started just after the first dose    Protocols used: Dizziness-ADULT-[place]    "

## 2024-08-23 ENCOUNTER — HOSPITAL ENCOUNTER (OUTPATIENT)
Dept: NON INVASIVE DIAGNOSTICS | Age: 39
Discharge: HOME | End: 2024-08-23
Payer: COMMERCIAL

## 2024-08-23 VITALS
HEART RATE: 79 BPM | WEIGHT: 212 LBS | HEIGHT: 65 IN | SYSTOLIC BLOOD PRESSURE: 132 MMHG | BODY MASS INDEX: 35.32 KG/M2 | DIASTOLIC BLOOD PRESSURE: 82 MMHG

## 2024-08-23 DIAGNOSIS — Q23.1 BICUSPID AORTIC VALVE: ICD-10-CM

## 2024-08-23 LAB
AORTIC ROOT: 3.2 CM
APICAL FOUR CHAMBER EJECTION FRACTION: 54 %
ASCENDING AORTA: 3 CM
BSA FOR ECHO PROCEDURE: 2.03 M2
DOP CALC LVOT AREA: 3.14 CM2
DOP CALC LVOT DIAMETER: 2 CM
E WAVE DECELERATION TIME: 203 MS
E/A RATIO: 1.1
FRACTIONAL SHORTENING: 42 (ref 28–44)
INTERVENTRICULAR SEPTUM IN DIASTOLE (PARASTERNAL SHORT AXIS VIEW): 0.9 CM
INTERVENTRICULAR SEPTUM: 0.9 CM (ref 0.6–1.1)
LAAS-AP2: 17.9 CM2
LAAS-AP4: 16.1 CM2
LEFT ATRIUM AREA SYSTOLE SINGLE PLANE A4C: 15.8 CM2
LEFT ATRIUM SIZE: 3.8 CM
LEFT ATRIUM VOLUME (MOD BIPLANE): 48 ML
LEFT ATRIUM VOLUME INDEX (MOD BIPLANE): 23.6 ML/M2
LEFT INTERNAL DIMENSION IN SYSTOLE: 3.2 CM (ref 2.1–4)
LEFT VENTRICLE DIASTOLIC VOLUME (MOD BIPLANE): 109 ML
LEFT VENTRICLE DIASTOLIC VOLUME INDEX (MOD BIPLANE): 53.7 ML/M2
LEFT VENTRICLE SYSTOLIC VOLUME (MOD BIPLANE): 49 ML
LEFT VENTRICLE SYSTOLIC VOLUME INDEX (MOD BIPLANE): 24.1 ML/M2
LEFT VENTRICULAR INTERNAL DIMENSION IN DIASTOLE: 5.5 CM (ref 3.5–6)
LEFT VENTRICULAR POSTERIOR WALL IN END DIASTOLE: 1 CM
LEFT VENTRICULAR STROKE VOLUME: 107 ML
LV EF: 55 %
LVSV (TEICH): 107 ML
MITRAL REGURGITATION PEAK VELOCITY: 5.6 M/S
MITRAL VALVE MEAN INFLOW VELOCITY: 4.59 M/S
MITRAL VALVE REGURGITANT PEAK GRADIENT: 125 MMHG
MV E'TISSUE VEL-SEP: 8 CM/S
MV PEAK A VEL: 0.73 M/S
MV PEAK E VEL: 80 CM/S
MV STENOSIS PRESSURE HALF TIME: 59 MS
MV VALVE AREA P 1/2 METHOD: 3.73
RIGHT ATRIUM AREA SYSTOLE A4C: 14.4 CM2
RIGHT VENTRICLE ID DIMENSION: 4.1 CM
SL CV DOP CALC MV VTI RETROGRADE: 188.6 CM
SL CV LEFT ATRIUM LENGTH A2C: 5.1 CM
SL CV LV EF: 55
SL CV MV MEAN GRADIENT RETROGRADE: 93 MMHG
SL CV PED ECHO LEFT VENTRICLE DIASTOLIC VOLUME (MOD BIPLANE) 2D: 148 ML
SL CV PED ECHO LEFT VENTRICLE SYSTOLIC VOLUME (MOD BIPLANE) 2D: 40 ML
SL CV TR MAX PG ANTEGRADE: 22 MMHG
TR MAX PG: 24 MMHG
TR PEAK VELOCITY: 2.4 M/S
TRICUSPID ANNULAR PLANE SYSTOLIC EXCURSION: 2.5 CM
TRICUSPID VALVE PEAK REGURGITATION VELOCITY: 2.43 M/S
TV MEAN GRADIENT: 17 MMHG
TV MV D: 1.99 M/S
TV VTI: 78.31 CM

## 2024-08-23 PROCEDURE — 93306 TTE W/DOPPLER COMPLETE: CPT | Performed by: INTERNAL MEDICINE

## 2024-08-23 PROCEDURE — 93306 TTE W/DOPPLER COMPLETE: CPT

## 2024-09-18 ENCOUNTER — HOSPITAL ENCOUNTER (EMERGENCY)
Facility: HOSPITAL | Age: 39
Discharge: HOME | End: 2024-09-18
Attending: EMERGENCY MEDICINE | Admitting: EMERGENCY MEDICINE
Payer: COMMERCIAL

## 2024-09-18 VITALS
HEART RATE: 120 BPM | SYSTOLIC BLOOD PRESSURE: 154 MMHG | OXYGEN SATURATION: 99 % | TEMPERATURE: 103.2 F | DIASTOLIC BLOOD PRESSURE: 100 MMHG | RESPIRATION RATE: 18 BRPM

## 2024-09-18 DIAGNOSIS — B34.9 ACUTE VIRAL SYNDROME: Primary | ICD-10-CM

## 2024-09-18 LAB
FLUAV AG UPPER RESP QL IA.RAPID: NEGATIVE
FLUBV AG UPPER RESP QL IA.RAPID: NEGATIVE
SARS-COV+SARS-COV-2 AG RESP QL IA.RAPID: NEGATIVE

## 2024-09-18 PROCEDURE — 99284 EMERGENCY DEPT VISIT MOD MDM: CPT | Performed by: EMERGENCY MEDICINE

## 2024-09-18 PROCEDURE — 87804 INFLUENZA ASSAY W/OPTIC: CPT

## 2024-09-18 PROCEDURE — 99284 EMERGENCY DEPT VISIT MOD MDM: CPT

## 2024-09-18 PROCEDURE — 87811 SARS-COV-2 COVID19 W/OPTIC: CPT

## 2024-09-18 RX ORDER — ACETAMINOPHEN 325 MG/1
975 TABLET ORAL ONCE
Status: COMPLETED | OUTPATIENT
Start: 2024-09-18 | End: 2024-09-18

## 2024-09-18 RX ORDER — DIPHENHYDRAMINE HCL 25 MG
25 TABLET ORAL ONCE
Status: COMPLETED | OUTPATIENT
Start: 2024-09-18 | End: 2024-09-18

## 2024-09-18 RX ADMIN — DIPHENHYDRAMINE HYDROCHLORIDE 25 MG: 25 TABLET ORAL at 12:04

## 2024-09-18 RX ADMIN — ACETAMINOPHEN 975 MG: 325 TABLET ORAL at 12:04

## 2024-09-18 NOTE — ED PROVIDER NOTES
1. Acute viral syndrome      ED Disposition       ED Disposition   Discharge    Condition   Stable    Date/Time   Wed Sep 18, 2024 11:54 AM    Comment   Farhana Thomson discharge to home/self care.                   Assessment & Plan       Medical Decision Making  Risk  OTC drugs.      Differential includes viral syndrome gastro enteritis gastritis sinusitis less likely otitis media, bowel obstruction                 Medications   diphenhydrAMINE (BENADRYL) tablet 25 mg (25 mg Oral Given 9/18/24 1204)   acetaminophen (TYLENOL) tablet 975 mg (975 mg Oral Given 9/18/24 1204)       History of Present Illness       HPI    History from patient complains of bodyaches gradually worsening into fever bilateral ear pain and pressure frontal headache and then watery orange-colored diarrhea no blood, some generalized crampy pain more epigastric location.  Symptoms have progressed over the last 4 days.  Son with mild cough symptoms.  Patient has been taking NSAIDs without relief.    Review of Systems   Constitutional:  Positive for chills and fever.   HENT:  Positive for ear pain. Negative for rhinorrhea and sore throat.    Respiratory:  Negative for cough and shortness of breath.    Cardiovascular:  Negative for chest pain.   Gastrointestinal:  Positive for abdominal pain and diarrhea. Negative for blood in stool, constipation, nausea and vomiting.   Genitourinary:  Negative for dysuria and frequency.   Skin:  Negative for rash.   All other systems reviewed and are negative.          Objective     ED Triage Vitals   Temperature Pulse Blood Pressure Respirations SpO2 Patient Position - Orthostatic VS   09/18/24 1044 09/18/24 1046 09/18/24 1046 09/18/24 1044 09/18/24 1046 09/18/24 1046   (!) 103.2 °F (39.6 °C) (!) 120 154/100 18 99 % Lying      Temp Source Heart Rate Source BP Location FiO2 (%) Pain Score    09/18/24 1044 09/18/24 1046 09/18/24 1046 -- 09/18/24 1204    Oral Monitor Left arm  Med Not Given for Pain - for  MAR use only        Physical Exam  Vitals and nursing note reviewed.   Constitutional:       Appearance: She is well-developed.   HENT:      Head: Normocephalic and atraumatic.      Right Ear: External ear normal. No tenderness. A middle ear effusion is present. Tympanic membrane is not erythematous.      Left Ear: External ear normal. No tenderness. A middle ear effusion is present. Tympanic membrane is not erythematous.      Nose: Nose normal.   Eyes:      Conjunctiva/sclera: Conjunctivae normal.      Pupils: Pupils are equal, round, and reactive to light.   Cardiovascular:      Rate and Rhythm: Regular rhythm. Bradycardia present.      Heart sounds: Normal heart sounds.   Pulmonary:      Effort: Pulmonary effort is normal. No respiratory distress.      Breath sounds: Normal breath sounds. No wheezing.   Abdominal:      General: Bowel sounds are normal. There is no distension.      Palpations: Abdomen is soft.      Tenderness: There is no abdominal tenderness.   Musculoskeletal:         General: No deformity. Normal range of motion.      Cervical back: Normal range of motion and neck supple. No spinous process tenderness.   Skin:     General: Skin is warm and dry.      Findings: No rash.   Neurological:      General: No focal deficit present.      Mental Status: She is alert.      GCS: GCS eye subscore is 4. GCS verbal subscore is 5. GCS motor subscore is 6.      Sensory: No sensory deficit.   Psychiatric:         Mood and Affect: Mood normal.         Labs Reviewed   COVID-19/INFLUENZA A/B RAPID ANTIGEN (30 MIN.TAT) - Normal       Result Value    SARS COV Rapid Antigen Negative      Influenza A Rapid Antigen Negative      Influenza B Rapid Antigen Negative      Narrative:     This test has been performed using the Quidel Jania 2 FLU+SARS Antigen test under the Emergency Use Authorization (EUA). This test has been validated by the  and verified by the performing laboratory. The Jania uses lateral flow  immunofluorescent sandwich assay to detect SARS-COV, Influenza A and Influenza B Antigen.     The Quidel Jania 2 SARS Antigen test does not differentiate between SARS-CoV and SARS-CoV-2.     Negative results are presumptive and may be confirmed with a molecular assay, if necessary, for patient management. Negative results do not rule out SARS-CoV-2 or influenza infection and should not be used as the sole basis for treatment or patient management decisions. A negative test result may occur if the level of antigen in a sample is below the limit of detection of this test.     Positive results are indicative of the presence of viral antigens, but do not rule out bacterial infection or co-infection with other viruses.     All test results should be used as an adjunct to clinical observations and other information available to the provider.    FOR PEDIATRIC PATIENTS - copy/paste COVID Guidelines URL to browser: [email or web address]     No orders to display       Procedures       Vamsi Roman DO  09/18/24 2834

## 2024-09-30 ENCOUNTER — OFFICE VISIT (OUTPATIENT)
Dept: DERMATOLOGY | Facility: CLINIC | Age: 39
End: 2024-09-30
Payer: COMMERCIAL

## 2024-09-30 VITALS — TEMPERATURE: 97.7 F | WEIGHT: 210.5 LBS | BODY MASS INDEX: 35.03 KG/M2

## 2024-09-30 DIAGNOSIS — L81.4 LENTIGO: ICD-10-CM

## 2024-09-30 DIAGNOSIS — D22.70 MULTIPLE BENIGN MELANOCYTIC NEVI OF UPPER EXTREMITY, LOWER EXTREMITY, AND TRUNK: ICD-10-CM

## 2024-09-30 DIAGNOSIS — D48.9 NEOPLASM OF UNCERTAIN BEHAVIOR: ICD-10-CM

## 2024-09-30 DIAGNOSIS — D18.01 CHERRY ANGIOMA: ICD-10-CM

## 2024-09-30 DIAGNOSIS — D22.5 MULTIPLE BENIGN MELANOCYTIC NEVI OF UPPER EXTREMITY, LOWER EXTREMITY, AND TRUNK: ICD-10-CM

## 2024-09-30 DIAGNOSIS — D23.9 DERMATOFIBROMA: Primary | ICD-10-CM

## 2024-09-30 DIAGNOSIS — L82.1 SEBORRHEIC KERATOSES: ICD-10-CM

## 2024-09-30 DIAGNOSIS — D22.60 MULTIPLE BENIGN MELANOCYTIC NEVI OF UPPER EXTREMITY, LOWER EXTREMITY, AND TRUNK: ICD-10-CM

## 2024-09-30 PROCEDURE — 11104 PUNCH BX SKIN SINGLE LESION: CPT | Performed by: REGISTERED NURSE

## 2024-09-30 PROCEDURE — 88305 TISSUE EXAM BY PATHOLOGIST: CPT | Performed by: PATHOLOGY

## 2024-09-30 PROCEDURE — 99204 OFFICE O/P NEW MOD 45 MIN: CPT | Performed by: REGISTERED NURSE

## 2024-09-30 NOTE — PROGRESS NOTES
"[facility] Dermatology Clinic Note     Patient Name: Farhana Thomson  Encounter Date: 9/30/24     Have you been cared for by a [facility] Dermatologist in the last 3 years and, if so, which description applies to you?    NO.   I am considered a \"new\" patient and must complete all patient intake questions. I am FEMALE/of child-bearing potential.    REVIEW OF SYSTEMS:  Have you recently had or currently have any of the following? Recent fever or chills? No  Any non-healing wound? No  Are you pregnant or planning to become pregnant? No  Are you currently or planning to be nursing or breast feeding? No   PAST MEDICAL HISTORY:  Have you personally ever had or currently have any of the following?  If \"YES,\" then please provide more detail. Skin cancer (such as Melanoma, Basal Cell Carcinoma, Squamous Cell Carcinoma?  No  Tuberculosis, HIV/AIDS, Hepatitis B or C: No  Radiation Treatment No   HISTORY OF IMMUNOSUPPRESSION:   Do you have a history of any of the following:  Systemic Immunosuppression such as Diabetes, Biologic or Immunotherapy, Chemotherapy, Organ Transplantation, Bone Marrow Transplantation or Prednsione?  YES, pre-diabetic    Answering \"YES\" requires the addition of the dotphrase \"IMMUNOSUPPRESSED\" as the first diagnosis of the patient's visit.   FAMILY HISTORY:  Any \"first degree relatives\" (parent, brother, sister, or child) with the following?    Skin Cancer, Pancreatic or Other Cancer? YES, sister: skin cancer; dad: kidney cancer.   PATIENT EXPERIENCE:    Do you want the Dermatologist to perform a COMPLETE skin exam today including a clinical examination under the \"bra and underwear\" areas?  Yes  If necessary, do we have your permission to call and leave a detailed message on your Preferred Phone number that includes your specific medical information?  Yes      Allergies   Allergen Reactions    Gadobutrol Cough and Itching    Nuts - Food Allergy Anaphylaxis    Shellfish-Derived Products - Food " Allergy Anaphylaxis    Shrimp Extract Allergy Skin Test - Food Allergy Headache    Venlafaxine Anaphylaxis     [facility]    Gadolinium Derivatives Other (See Comments)     Pt sts allergy to a contrast in MRI years ago per pt (originally documented as a CT contrast- pt had CT contrast 5/24/23 without incident)    Hydromorphone Itching     During labor had reaction    Morphine Itching    Peanut Oil - Food Allergy Other (See Comments)     Itching, trouble breathing      Current Outpatient Medications:     albuterol (PROVENTIL HFA,VENTOLIN HFA) 90 mcg/act inhaler, INHALE 1 PUFF EVERY 2-3 HOURS AS NEEDED FOR WHEEZING, Disp: , Rfl:     cetirizine (ZyrTEC) 10 MG chewable tablet, Chew 10 mg daily, Disp: , Rfl:     EPINEPHrine (EPIPEN) 0.3 mg/0.3 mL SOAJ, Inject 0.3 mg into a muscle every 8 (eight) hours as needed for anaphylaxis, Disp: , Rfl:     metoprolol succinate (TOPROL-XL) 25 mg 24 hr tablet, Take 3 tablets (75 mg total) by mouth 2 (two) times a day, Disp: 270 tablet, Rfl: 4    norethindrone (Incassia) 0.35 MG tablet, Take 1 tablet (0.35 mg total) by mouth daily, Disp: 28 tablet, Rfl: 13    omeprazole (PriLOSEC) 20 mg delayed release capsule, Take 20 mg by mouth, Disp: , Rfl:     Ubrogepant (UBRELVY) 100 MG tablet, Take 1 tablet (100 mg) one time as needed for migraine. May repeat one additional tablet (100 mg) at least two hours after the first dose. Do not use more than two doses per day, or for more than eight days per month., Disp: 8 tablet, Rfl: 1          Whom besides the patient is providing clinical information about today's encounter?   NO ADDITIONAL HISTORIAN (patient alone provided history)    Physical Exam and Assessment/Plan by Diagnosis:    NEOPLASM OF UNCERTAIN BEHAVIOR OF SKIN    Physical Exam:  (Anatomic Location); (Size and Morphological Description); (Differential Diagnosis):  A: right shoulder; pink firm fibrotic papule; r/o scar vs. Dermatofibroma vs. DFSP vs. Leiomyoma    Pertinent  "Positives:  Pertinent Negatives:    Additional History of Present Condition:  Patient reports that this spot has been present for years and has grown in size recently.    Assessment and Plan:  I have discussed with the patient that a sample of skin via a \"skin biopsy” would be potentially helpful to further make a specific diagnosis under the microscope.  Based on a thorough discussion of this condition and the management approach to it (including a comprehensive discussion of the known risks, side effects and potential benefits of treatment), the patient (family) agrees to implement the following specific plan:    Procedure:  Skin Biopsy.  After a thorough discussion of treatment options and risk/benefits/alternatives (including but not limited to local pain, scarring, dyspigmentation, blistering, possible superinfection, and inability to confirm a diagnosis via histopathology), verbal and written consent were obtained and portion of the rash was biopsied for tissue sample.  See below for consent that was obtained from patient and subsequent Procedure Note.     PROCEDURE NOTE:  PUNCH BIOPSY      Performing Physician:  Terry    Anatomic Location; Clinical Description with size (cm); Pre-Op Diagnosis:    A: right shoulder; pink firm fibrotic papule; r/o scar vs. Dermatofibroma vs. DFSP vs. Leiomyoma        Anesthesia: 2% Xylocaine with epi      Topical anesthesia: None       Indications: To indicate diagnosis and management plan.    Procedure Details     Patient informed of the risks (including bleeding,scaring and infection) and benefits of the procedure explained. Verbal and written informed consent obtained. The area was prepped and draped in the usual fashion. Anesthesia was obtained with 1% lidocaine with epinephrine. The skin was then stretched perpendicular to the skin tension lines and a punch biopsy to an appropriate sampling depth was obtained with a 4 mm punch with a forceps and iris scissors. " "    Hemostasis was obtained with 4-0 Prolene x 2 sutures.     Complications:  None      Specimen has been sent for review by Dermatopathology.      Plan:  1. Instructed to keep the wound dry and covered for 24-48h and clean thereafter.  2. Warning signs of infection were reviewed.    3. Recommended that the patient use acetaminophen as needed for pain  4. Sutures if any should be removed in 10-14 days      Standard post-procedure care has been explained and has been included in written form within the patient's copy of Informed Consent.       DERMATOFIBROMA    Physical Exam:  Anatomic Location Affected:  left shin  Morphological Description:  skin colored fibrotic papule   Pertinent Positives:  Pertinent Negatives:    Additional History of Present Condition:  noted on exam.    Assessment and Plan:  Based on a thorough discussion of this condition and the management approach to it (including a comprehensive discussion of the known risks, side effects and potential benefits of treatment), the patient (family) agrees to implement the following specific plan:  Reassure benign       CHERRY ANGIOMAS     Physical Exam:  Anatomic Location Affected:  Trunk and extremities  Morphological Description:  Scattered cherry red papules  Denies pain, itch, bleeding. No treatments tried. Present for years. Present constantly; no modifying factors which make it worse or better.     Assessment and Plan:  Based on a thorough discussion of this condition and the management approach to it (including a comprehensive discussion of the known risks, side effects and potential benefits of treatment), the patient (family) agrees to implement the following specific plan:  Reassure benign        SEBORRHEIC KERATOSIS; NON-INFLAMED     Physical Exam:  Anatomic Location Affected:  Trunk and extremities  Morphological Description:  Waxy, smooth to warty textured, yellow to brownish-grey to dark brown to blackish, discrete, \"stuck-on\" appearing " "papules.  Present for years. Denies pain, itch, bleeding.      Additional History of Present Condition:  Present constantly; no modifying factors which make it worse or better. No prior treatment.       Assessment and Plan:  Based on a thorough discussion of this condition and the management approach to it (including a comprehensive discussion of the known risks, side effects and potential benefits of treatment), the patient (family) agrees to implement the following specific plan:  Reassure benign  Use sun protection.  Apply SPF 30 or higher at least three times a day.  Wear sun protecting clothing and hats.        SOLAR LENTIGINES   OTHER SKIN CHANGES DUE TO CHRONIC EXPOSURE TO NONIONIZING RADIATION     Physical Exam:  Anatomic Location Affected:  Sun exposed areas of back, chest, arms, legs  Morphological Description:  Multiple scattered brown to tan evenly pigmented macules   Denies pain, itch, bleeding. No treatments tried. Present for months - years. Reports getting newer lesions with sun exposure.         Assessment and Plan:  Based on a thorough discussion of this condition and the management approach to it (including a comprehensive discussion of the known risks, side effects and potential benefits of treatment), the patient (family) agrees to implement the following specific plan:  Reassure benign  Use sun protection.  Apply SPF 30 or higher at least three times a day.  Wear sun protecting clothing and hats.         MULTIPLE MELANOCYTIC NEVI (\"Moles\")     Physical Exam:  Anatomic Location Affected: Trunk and extremities  Morphological Description:  Scattered, round to ovoid, symmetrical-appearing, even bordered, skin colored to dark brown macules/papules  Denies pain, itch, bleeding. No treatments tried. Present for years. Present constantly; no modifying factors which make it worse or better. Denies actively changing or growing moles.      Assessment and Plan:  Based on a thorough discussion of this " condition and the management approach to it (including a comprehensive discussion of the known risks, side effects and potential benefits of treatment), the patient (family) agrees to implement the following specific plan:  Reassure benign  Monitor for changes  Use sun protection.  Apply SPF 30 or higher at least three times a day.  Wear sun protecting clothing and hats.       Worrisome signs of skin malignancy discussed, questions answered. Regular self-skin check discussed. Advised to call or return to office if patient notices any spots of concern, rapidly growing/changing lesions, bleeding lesions, non-healing lesions. Advised regular SPF use.        Scribe Attestation      I,:  Maria Esther Gibson MA am acting as a scribe while in the presence of the attending physician.:       I,:  Aron Stewart MD personally performed the services described in this documentation    as scribed in my presence.:

## 2024-10-01 ENCOUNTER — HOSPITAL ENCOUNTER (OUTPATIENT)
Dept: ULTRASOUND IMAGING | Facility: CLINIC | Age: 39
Discharge: HOME | End: 2024-10-01
Payer: COMMERCIAL

## 2024-10-01 ENCOUNTER — HOSPITAL ENCOUNTER (OUTPATIENT)
Dept: MAMMOGRAPHY | Facility: CLINIC | Age: 39
Discharge: HOME | End: 2024-10-01
Payer: COMMERCIAL

## 2024-10-01 VITALS — WEIGHT: 210 LBS | BODY MASS INDEX: 34.99 KG/M2 | HEIGHT: 65 IN

## 2024-10-01 DIAGNOSIS — R92.8 ABNORMAL MAMMOGRAM: ICD-10-CM

## 2024-10-01 PROCEDURE — 76642 ULTRASOUND BREAST LIMITED: CPT

## 2024-10-01 PROCEDURE — 77065 DX MAMMO INCL CAD UNI: CPT

## 2024-10-01 PROCEDURE — G0279 TOMOSYNTHESIS, MAMMO: HCPCS

## 2024-10-01 NOTE — PROGRESS NOTES
Met with patient and Dr. Alonso regarding recommendation for;    _____ RIGHT __X____LEFT      __X___Ultrasound guided  ______Stereotactic breast biopsy.      __X___Verbalized understanding.    Reviewed clip placement with patient, pt states understanding: Yes: _X___ No: ____  Comments:    Blood thinners:  No: _____ Yes: ___X___ What:   ASA-81 mg       Biopsy teaching sheet given:  Yes: ___X___ No: ________    Pt given contact information and adv to call with any questions/needs    Patient advised to arrive at 0800 for a 0830 appointment

## 2024-10-04 PROCEDURE — 88305 TISSUE EXAM BY PATHOLOGIST: CPT | Performed by: PATHOLOGY

## 2024-10-04 NOTE — RESULT ENCOUNTER NOTE
DERMATOPATHOLOGY RESULT NOTE    Results reviewed by ordering physician.  Called patient to personally discuss results. Discussed results with patient.       Instructions for Clinical Derm Team:   (remember to route Result Note to appropriate staff):    None    Result & Plan by Specimen:    Specimen A: benign  Plan: reassured, benign    Final Diagnosis  A. Skin, right shoulder:  DERMATOFIBROMA    Electronically signed by Michael Schwarz MD on 10/4/2024 at 11:21 AM

## 2024-10-10 ENCOUNTER — HOSPITAL ENCOUNTER (OUTPATIENT)
Dept: ULTRASOUND IMAGING | Facility: CLINIC | Age: 39
Discharge: HOME | End: 2024-10-10
Payer: COMMERCIAL

## 2024-10-10 ENCOUNTER — HOSPITAL ENCOUNTER (OUTPATIENT)
Dept: MAMMOGRAPHY | Facility: CLINIC | Age: 39
Discharge: HOME | End: 2024-10-10
Payer: COMMERCIAL

## 2024-10-10 VITALS — HEART RATE: 64 BPM | DIASTOLIC BLOOD PRESSURE: 84 MMHG | SYSTOLIC BLOOD PRESSURE: 132 MMHG

## 2024-10-10 DIAGNOSIS — R92.8 ABNORMAL MAMMOGRAM: ICD-10-CM

## 2024-10-10 PROCEDURE — 88342 IMHCHEM/IMCYTCHM 1ST ANTB: CPT | Performed by: PATHOLOGY

## 2024-10-10 PROCEDURE — 88305 TISSUE EXAM BY PATHOLOGIST: CPT | Performed by: PATHOLOGY

## 2024-10-10 PROCEDURE — A4648 IMPLANTABLE TISSUE MARKER: HCPCS

## 2024-10-10 PROCEDURE — 19083 BX BREAST 1ST LESION US IMAG: CPT

## 2024-10-10 PROCEDURE — 88341 IMHCHEM/IMCYTCHM EA ADD ANTB: CPT | Performed by: PATHOLOGY

## 2024-10-10 RX ORDER — LIDOCAINE HYDROCHLORIDE 10 MG/ML
5 INJECTION, SOLUTION EPIDURAL; INFILTRATION; INTRACAUDAL; PERINEURAL ONCE
Status: COMPLETED | OUTPATIENT
Start: 2024-10-10 | End: 2024-10-10

## 2024-10-10 RX ADMIN — LIDOCAINE HYDROCHLORIDE 5 ML: 10 INJECTION, SOLUTION EPIDURAL; INFILTRATION; INTRACAUDAL; PERINEURAL at 08:36

## 2024-10-10 NOTE — PROGRESS NOTES
Procedure type:    __x___ultrasound guided _____stereotactic    Breast:    ___x__Left _____Right    Location: 3 o'clock 4 cmfn     Needle: 14g    # of passes: 4    Clip: Open coil     Performed by: Dr. Sood     Pressure held for 5 minutes by: Judy Watters Strips:    ___X__yes _____no    Band aid:    __X___yes_____no    Tolerated procedure:    __X___yes _____no

## 2024-10-11 NOTE — PROGRESS NOTES
Post procedure call completed    Bleeding: _____yes __X___no    Pain: _____yes ___X___no    Redness/Swelling: ______yes ___X___no    Band aid removed: __x___yes _____no     Steri-Strips intact: ___X___yes _____no (discussed with patient to remove steri strips on Tues if they have not come off on their own)    Pt with no questions at this time, adv will call when results available, adv to call with any questions or concerns, has name/# for contact

## 2024-10-14 ENCOUNTER — TELEPHONE (OUTPATIENT)
Dept: MAMMOGRAPHY | Facility: CLINIC | Age: 39
End: 2024-10-14

## 2024-10-14 PROCEDURE — 88305 TISSUE EXAM BY PATHOLOGIST: CPT | Performed by: PATHOLOGY

## 2024-10-14 PROCEDURE — 88341 IMHCHEM/IMCYTCHM EA ADD ANTB: CPT | Performed by: PATHOLOGY

## 2024-10-14 PROCEDURE — 88342 IMHCHEM/IMCYTCHM 1ST ANTB: CPT | Performed by: PATHOLOGY

## 2024-11-27 ENCOUNTER — ANNUAL EXAM (OUTPATIENT)
Dept: OBGYN CLINIC | Facility: CLINIC | Age: 39
End: 2024-11-27
Payer: COMMERCIAL

## 2024-11-27 VITALS
HEIGHT: 65 IN | WEIGHT: 211.8 LBS | SYSTOLIC BLOOD PRESSURE: 128 MMHG | BODY MASS INDEX: 35.29 KG/M2 | DIASTOLIC BLOOD PRESSURE: 66 MMHG

## 2024-11-27 DIAGNOSIS — R87.810 ASCUS WITH POSITIVE HIGH RISK HPV CERVICAL: ICD-10-CM

## 2024-11-27 DIAGNOSIS — Z12.4 SCREENING FOR MALIGNANT NEOPLASM OF THE CERVIX: ICD-10-CM

## 2024-11-27 DIAGNOSIS — R87.610 ASCUS WITH POSITIVE HIGH RISK HPV CERVICAL: ICD-10-CM

## 2024-11-27 DIAGNOSIS — Z30.41 ENCOUNTER FOR SURVEILLANCE OF CONTRACEPTIVE PILLS: ICD-10-CM

## 2024-11-27 DIAGNOSIS — Z01.419 ROUTINE GYNECOLOGICAL EXAMINATION: Primary | ICD-10-CM

## 2024-11-27 DIAGNOSIS — Z12.31 ENCOUNTER FOR SCREENING MAMMOGRAM FOR BREAST CANCER: ICD-10-CM

## 2024-11-27 PROCEDURE — S0612 ANNUAL GYNECOLOGICAL EXAMINA: HCPCS | Performed by: OBSTETRICS & GYNECOLOGY

## 2024-11-27 RX ORDER — ACETAMINOPHEN AND CODEINE PHOSPHATE 120; 12 MG/5ML; MG/5ML
1 SOLUTION ORAL DAILY
Qty: 84 TABLET | Refills: 4 | Status: SHIPPED | OUTPATIENT
Start: 2024-11-27

## 2024-11-27 RX ORDER — ASPIRIN 81 MG/1
81 TABLET ORAL
COMMUNITY
Start: 2024-07-19

## 2024-11-27 NOTE — PROGRESS NOTES
[facility] - [address], [address], [place] [address]    ASSESSMENT/PLAN: Farhana Thomson is a 39 y.o.  who presents for annual gynecologic exam.    Encounter for routine gynecologic examination  - Routine well woman exam completed today.  - Cervical Cancer Screening: Current ASCCP Guidelines reviewed. Last Pap: 2023 . Next Pap Due: today due to ascus/HR HPV . HR HPV  - negative colpo  - HPV Vaccination status: Not immunized  - Contraceptive counseling discussed.  Current contraception: progesterone only pills  - Breast Cancer Screening: Last Mammogram 2024  - Colorectal cancer screening was not ordered.  - The following were reviewed in today's visit: breast self exam and use and side effects of OCPs. Plans on vasectomy. Interested in combination Ocs but due to HTN and migraine with aura this is not recommended. Discussed Mirena as an option if vasectomy is not done or if her bleeding becomes problematic for her    Additional problems addressed during this visit:  1. Routine gynecological examination  2. Encounter for screening mammogram for breast cancer  -     Mammo screening bilateral w 3d and cad; Future  3. Encounter for surveillance of contraceptive pills  -     norethindrone (Incassia) 0.35 MG tablet; Take 1 tablet (0.35 mg total) by mouth daily  4. Screening for malignant neoplasm of the cervix  -     IGP, Aptima HPV, Rfx 16/18,45  5. ASCUS with positive high risk HPV cervical  Assessment & Plan:  Continue annual surveillance  Orders:  -     IGP, Aptima HPV, Rfx 16/18,45      CC:  Annual Gynecologic Examination    HPI: Farhana Thomson is a 39 y.o.  who presents for annual gynecologic examination.  HPI    The following portions of the patient's history were reviewed and updated as appropriate: She  has a past medical history of Acid reflux, Bicuspid aortic valve, COVID-19 affecting pregnancy in second trimester (2021),  COVID-19 affecting pregnancy in third trimester (01/20/2022), Depression (2004), Diabetes mellitus (HCC), Female infertility, Gestational hypertension (2011), Gestational hypertension, third trimester (01/27/2022), High-risk pregnancy, HX DVT (deep venous thrombosis) (HCC), Hypertension, Hypertension in pregnancy, preeclampsia, severe, delivered/postpartum (04/06/2022), Migraine, PCOS (polycystic ovarian syndrome) (2010), Pituitary microadenoma (HCC) (2009), Prediabetes (2016), and Thoracic outlet syndrome (2004).  She  has a past surgical history that includes Bone Resection, Rib (Right, 2004); [place] tooth extraction (Bilateral); Breast biopsy (Left, 10/10/2024); and US guided breast biopsy left complete (Left, 10/10/2024).  Her family history includes Cancer in her father, paternal aunt, paternal aunt, paternal aunt, and paternal grandmother; Colon cancer in her maternal grandmother; Deep vein thrombosis in her mother; Diabetes in her brother, father, and mother; Heart disease in her father; Kidney cancer (age of onset: 60) in her father; No Known Problems in her daughter, maternal grandfather, paternal grandfather, and sister; Seizures in her brother; Stroke in her father.  She  reports that she has never smoked. She has never been exposed to tobacco smoke. She has never used smokeless tobacco. She reports that she does not currently use alcohol. She reports that she does not use drugs.  Current Outpatient Medications   Medication Sig Dispense Refill    albuterol (PROVENTIL HFA,VENTOLIN HFA) 90 mcg/act inhaler INHALE 1 PUFF EVERY 2-3 HOURS AS NEEDED FOR WHEEZING      aspirin (Adult Aspirin Regimen) 81 mg EC tablet Take 81 mg by mouth      cetirizine (ZyrTEC) 10 MG chewable tablet Chew 10 mg daily      EPINEPHrine (EPIPEN) 0.3 mg/0.3 mL SOAJ Inject 0.3 mg into a muscle every 8 (eight) hours as needed for anaphylaxis      metoprolol succinate (TOPROL-XL) 25 mg 24 hr tablet Take 3 tablets (75 mg total) by mouth 2  "(two) times a day 270 tablet 4    norethindrone (Incassia) 0.35 MG tablet Take 1 tablet (0.35 mg total) by mouth daily 84 tablet 4    omeprazole (PriLOSEC) 20 mg delayed release capsule Take 20 mg by mouth      Ubrogepant (UBRELVY) 100 MG tablet Take 1 tablet (100 mg) one time as needed for migraine. May repeat one additional tablet (100 mg) at least two hours after the first dose. Do not use more than two doses per day, or for more than eight days per month. 8 tablet 1     No current facility-administered medications for this visit.     She is allergic to gadobutrol, nuts - food allergy, shellfish-derived products - food allergy, shrimp extract allergy skin test - food allergy, venlafaxine, gadolinium derivatives, hydromorphone, morphine, and peanut oil - food allergy..    Review of Systems      Objective:  /66 (BP Location: Left arm, Patient Position: Sitting, Cuff Size: Standard)   Ht 5' 5\" (1.651 m)   Wt 96.1 kg (211 lb 12.8 oz)   Breastfeeding No   BMI 35.25 kg/m²    Physical Exam      PE:  General Appearance: alert and oriented, in no acute distress.   HEENT: PERRL, thyroid without masses or tenderness  Breast: No masses, tenderness, skin changes, nipple D/C or axillary or supraclavicular adenopathy  Abdomen: Soft, non-tender, non-distended, no masses, no rebound or guarding.  Pelvic:       External genitalia: Normal appearance, no abnormal pigmentation, no lesions or masses. Normal [place]'s and [place]'s.      Urinary system: Urethral meatus normal, bladder non-tender.      Vaginal: normal mucosa without prolapse or lesions. Normal-appearing physiologic discharge      Cervix: Normal-appearing, well-epithelialized, no gross lesions or masses No cervical motion tenderness.      Adnexa: No adnexal masses or tenderness noted.      Uterus: Normal-sized, regular contour, midline, mobile, no uterine tenderness.  Extremities: Normal range of motion.   Skin: normal, no rash or abnormalities  Neurologic: " alert, oriented x3  Psychiatric: Appropriate affect, mood stable, cooperative with exam.

## 2024-12-08 LAB
CYTOLOGIST CVX/VAG CYTO: ABNORMAL
DX ICD CODE: ABNORMAL
HPV GENOTYPE REFLEX: ABNORMAL
HPV I/H RISK 4 DNA CVX QL PROBE+SIG AMP: POSITIVE
HPV16 DNA CVX QL PROBE+SIG AMP: NEGATIVE
HPV18+45 E6+E7 MRNA CVX QL NAA+PROBE: NEGATIVE
OTHER STN SPEC: ABNORMAL
PATH REPORT.FINAL DX SPEC: ABNORMAL
PATHOLOGIST CVX/VAG CYTO: ABNORMAL
RECOM F/U CVX/VAG CYTO: ABNORMAL
SL AMB NOTE:: ABNORMAL
SL AMB SPECIMEN ADEQUACY: ABNORMAL
SL AMB TEST METHODOLOGY: ABNORMAL

## 2024-12-24 ENCOUNTER — RESULTS FOLLOW-UP (OUTPATIENT)
Dept: OBGYN CLINIC | Facility: CLINIC | Age: 39
End: 2024-12-24

## 2025-01-07 ENCOUNTER — PROCEDURE VISIT (OUTPATIENT)
Dept: OBGYN CLINIC | Facility: CLINIC | Age: 40
End: 2025-01-07
Payer: COMMERCIAL

## 2025-01-07 ENCOUNTER — TELEPHONE (OUTPATIENT)
Age: 40
End: 2025-01-07

## 2025-01-07 VITALS
SYSTOLIC BLOOD PRESSURE: 128 MMHG | WEIGHT: 213 LBS | HEIGHT: 65 IN | DIASTOLIC BLOOD PRESSURE: 82 MMHG | BODY MASS INDEX: 35.49 KG/M2

## 2025-01-07 DIAGNOSIS — Z32.02 NEGATIVE PREGNANCY TEST: ICD-10-CM

## 2025-01-07 DIAGNOSIS — N64.3 GALACTORRHEA: Primary | ICD-10-CM

## 2025-01-07 DIAGNOSIS — R87.810 CERVICAL HIGH RISK HUMAN PAPILLOMAVIRUS (HPV) DNA TEST POSITIVE: Primary | ICD-10-CM

## 2025-01-07 LAB — SL AMB POCT URINE HCG: NEGATIVE

## 2025-01-07 PROCEDURE — 81025 URINE PREGNANCY TEST: CPT | Performed by: OBSTETRICS & GYNECOLOGY

## 2025-01-07 PROCEDURE — 57454 BX/CURETT OF CERVIX W/SCOPE: CPT | Performed by: OBSTETRICS & GYNECOLOGY

## 2025-01-07 NOTE — PROGRESS NOTES
Colposcopy    Date/Time: 1/7/2025 1:00 PM    Performed by: Scott Spence MD  Authorized by: Scott Spence MD    Verbal consent obtained?: Yes    Risks and benefits: Risks, benefits and alternatives were discussed    Consent given by:  Patient  Time Out:     Time out: Immediately prior to the procedure a time out was called    Patient states understanding of procedure being performed: Yes    Patient identity confirmed:  Verbally with patient  Pre-procedure:     Prepped with: acetic acid    Indication:     Indications: Persistant HR HPV.  Procedure:     Procedure: Colposcopy w/ cervical biopsy and ECC      Coyle speculum was placed in the vagina: yes      Under colposcopic examination the transition zone was seen in entirety: yes      Monsel's solution was applied: yes      Specimen(s) to pathology: yes    Post-procedure:     Findings: White epithelium      Impression: Low grade cervical dysplasia      Patient tolerance of procedure:  Tolerated well, no immediate complications  Comments:      Small area at 12. Difficult to biopsy due to cervical anatomy. ECC done

## 2025-01-07 NOTE — TELEPHONE ENCOUNTER
Patient stated when she was seen on 11/27/24 she discussed with Dr. Spence that she is no longer breast feeding but still producing milk.  She stated that thyroid tests and prolactin were to be ordered.  Patient went to have blood work drawn today and order was not in chart.  Patient stated she is going to go tomorrow and is calling to have order placed.  Attempted warm transfer.

## 2025-01-08 ENCOUNTER — APPOINTMENT (OUTPATIENT)
Dept: LAB | Facility: CLINIC | Age: 40
End: 2025-01-08
Payer: COMMERCIAL

## 2025-01-08 DIAGNOSIS — E78.2 MIXED HYPERLIPIDEMIA: ICD-10-CM

## 2025-01-08 DIAGNOSIS — N64.3 GALACTORRHEA: ICD-10-CM

## 2025-01-08 LAB
CHOLEST SERPL-MCNC: 216 MG/DL (ref ?–200)
HDLC SERPL-MCNC: 43 MG/DL
LDLC SERPL CALC-MCNC: 139 MG/DL (ref 0–100)
LDLC SERPL DIRECT ASSAY-MCNC: 156 MG/DL (ref 0–100)
NONHDLC SERPL-MCNC: 173 MG/DL
PROLACTIN SERPL-MCNC: 8.64 NG/ML (ref 3.34–26.72)
TRIGL SERPL-MCNC: 170 MG/DL (ref ?–150)
TSH SERPL DL<=0.05 MIU/L-ACNC: 4.49 UIU/ML (ref 0.45–4.5)

## 2025-01-08 PROCEDURE — 80061 LIPID PANEL: CPT

## 2025-01-08 PROCEDURE — 36415 COLL VENOUS BLD VENIPUNCTURE: CPT

## 2025-01-08 PROCEDURE — 84443 ASSAY THYROID STIM HORMONE: CPT

## 2025-01-08 PROCEDURE — 84146 ASSAY OF PROLACTIN: CPT

## 2025-01-08 PROCEDURE — 83721 ASSAY OF BLOOD LIPOPROTEIN: CPT

## 2025-01-12 LAB
PATH REPORT.SITE OF ORIGIN SPEC: NORMAL
PAYMENT PROCEDURE: NORMAL
SL AMB .: NORMAL

## 2025-01-27 ENCOUNTER — OFFICE VISIT (OUTPATIENT)
Dept: CARDIOLOGY CLINIC | Facility: CLINIC | Age: 40
End: 2025-01-27
Payer: COMMERCIAL

## 2025-01-27 VITALS
HEIGHT: 65 IN | HEART RATE: 67 BPM | DIASTOLIC BLOOD PRESSURE: 70 MMHG | SYSTOLIC BLOOD PRESSURE: 116 MMHG | WEIGHT: 215 LBS | BODY MASS INDEX: 35.82 KG/M2

## 2025-01-27 DIAGNOSIS — J96.01 ACUTE RESPIRATORY FAILURE WITH HYPOXIA (HCC): ICD-10-CM

## 2025-01-27 DIAGNOSIS — Q23.81 BICUSPID AORTIC VALVE: ICD-10-CM

## 2025-01-27 DIAGNOSIS — E78.5 DYSLIPIDEMIA: Primary | ICD-10-CM

## 2025-01-27 DIAGNOSIS — I10 PRIMARY HYPERTENSION: ICD-10-CM

## 2025-01-27 DIAGNOSIS — Q23.1 CONGENITAL INSUFFICIENCY OF AORTIC VALVE: ICD-10-CM

## 2025-01-27 DIAGNOSIS — I47.10 SVT (SUPRAVENTRICULAR TACHYCARDIA) (HCC): ICD-10-CM

## 2025-01-27 DIAGNOSIS — I47.29 NSVT (NONSUSTAINED VENTRICULAR TACHYCARDIA) (HCC): ICD-10-CM

## 2025-01-27 DIAGNOSIS — Q21.12 PFO (PATENT FORAMEN OVALE): ICD-10-CM

## 2025-01-27 PROCEDURE — 99214 OFFICE O/P EST MOD 30 MIN: CPT | Performed by: INTERNAL MEDICINE

## 2025-01-27 NOTE — PATIENT INSTRUCTIONS
Recommendations:  1. Continue current medications.  2. Proceed with exercise and dietary modification.   3. Check fasting lipid panel Summer 2025.  4. Follow up in 6 months   
PA Tiberio- Normal WBC. Lactate 1.5. CT ab/pelvis revealing sigmoid diverticulitis. Failed Augmentin. Suggested admission for IV ABX. Patient refused. States she is feeling better and would like ot try to oral ABX and will return if her symptoms are continuing or worsening. Allergy to Cipro. Will DC on Flagyl and Doxy.

## 2025-01-27 NOTE — PROGRESS NOTES
Cardiology   Farhana Thomson 39 y.o. female MRN: [id number]        Impression:  1. Bicuspid aortic valve - mild AI  2. NSVT - no significant episodes.   3. Hypertension - controlled.   4. PFO - on aspirin.   5. Dyslipidemia - /HDL 43.     Recommendations:  1. Continue current medications.  2. Proceed with exercise and dietary modification.   3. Check fasting lipid panel Summer 2025.  4. Follow up in 6 months         HPI: Farhana Thomson is a 39 y.o. year old female with bicuspid aortic valve, symptomatic PACs/PVCs, PFO on asa, and hypertension who returns for follow up. Saw Jayden Jones PA-C in 2023 - Zio monitor demonstrated PVCs/brieft SVT and metoprolol succinate increased. Echo 5/23 - EF 55%, bicuspid aortic valve with mild AI, and small PFO. Feels well. No chest pain, shortness of breath, or palpitations. Echo 8/24 - EF 55%, bicuspid aortic valve, mild MR.  Has been having palpitations, but improving.         Review of Systems   Constitutional: Negative.    HENT: Negative.     Eyes: Negative.    Respiratory:  Positive for shortness of breath. Negative for chest tightness.    Cardiovascular:  Positive for palpitations. Negative for chest pain and leg swelling.   Gastrointestinal: Negative.    Endocrine: Negative.    Genitourinary: Negative.    Musculoskeletal: Negative.    Skin: Negative.    Allergic/Immunologic: Negative.    Neurological: Negative.    Hematological: Negative.    Psychiatric/Behavioral: Negative.     All other systems reviewed and are negative.        Past Medical History:   Diagnosis Date    Acid reflux     Bicuspid aortic valve     COVID-19 affecting pregnancy in second trimester 12/12/2021    COVID-19 affecting pregnancy in third trimester 01/20/2022    Hospitalized 12/12-12/19/2021 with Covid pneumonia    Depression 2004    in 2004 after termination of pregnancy. was tx with zoloft     Diabetes mellitus (HCC)     prediabetic    Female infertility     Gestational  hypertension 2011    third trimester with her 1st baby. del @ 38 weeks    Gestational hypertension, third trimester 01/27/2022    First pregnancy, IOL for gestational HTN 9/16/2021 baseline LFTs and creatinine normal 10/6/2021 24hr urine collection 147mg  Patient taking ASA 162mg daily - stopped ASA 3/14/2022  Seen L&D 3/12/2022 - one elevated BP, others normal - labs  Normal.  D/c home w/ plan for f/u in office 3/15/2022.    High-risk pregnancy     HX DVT (deep venous thrombosis) (HCC)     in right axilla -- complication from thoracic outlet syndrome    Hypertension     Hypertension in pregnancy, preeclampsia, severe, delivered/postpartum 04/06/2022    Patient with gestational HTN in pregnancy and induced for same at 37 weeks. Labs were normal and she continued to have mildly elevated BP after delivery. PPD#16 she presented with blood pressures 150/100 and a headache for 4 days.  She does have h/o migraines. Admitted for severe preeclampsia and started on magnesium.    Migraine     PCOS (polycystic ovarian syndrome) 2010    initally diagnosed in 2010 tx with metformin. pt d/c med and then restarted in 2016    Pituitary microadenoma (HCC) 2009    last MRI in 2009    Prediabetes 2016    'impaired fasting glucose'     Thoracic outlet syndrome 2004    in 2004 per [facility] records; has surgical repair     Past Surgical History:   Procedure Laterality Date    BONE RESECTION, RIB Right 2004    BREAST BIOPSY Left 10/10/2024    US GUIDED BREAST BIOPSY LEFT COMPLETE Left 10/10/2024    WISDOM TOOTH EXTRACTION Bilateral      Social History     Substance and Sexual Activity   Alcohol Use Not Currently    Comment: social     Social History     Substance and Sexual Activity   Drug Use Never     Social History     Tobacco Use   Smoking Status Never    Passive exposure: Never   Smokeless Tobacco Never     Family History   Problem Relation Age of Onset    Deep vein thrombosis Mother     Diabetes Mother     Kidney cancer Father  60    Cancer Father         Kidney cancer    Diabetes Father     Heart disease Father         Permanent afib    Stroke Father     No Known Problems Sister     No Known Problems Daughter     Colon cancer Maternal Grandmother     No Known Problems Maternal Grandfather     Cancer Paternal Grandmother         unknown primary    No Known Problems Paternal Grandfather     Cancer Paternal Aunt         unknown primary    Cancer Paternal Aunt         unknown primary    Cancer Paternal Aunt         unknown primary    Diabetes Brother     Seizures Brother        Allergies:  Allergies   Allergen Reactions    Gadobutrol Cough and Itching    Nuts - Food Allergy Anaphylaxis    Shellfish-Derived Products - Food Allergy Anaphylaxis    Shrimp Extract Allergy Skin Test - Food Allergy Headache    Venlafaxine Anaphylaxis     Per [facility] records    Gadolinium Derivatives Other (See Comments)     Pt sts allergy to a contrast in MRI years ago per pt (originally documented as a CT contrast- pt had CT contrast 5/24/23 without incident)    Hydromorphone Itching     During labor had reaction    Morphine Itching    Peanut Oil - Food Allergy Other (See Comments)     Itching, trouble breathing       Medications:     Current Outpatient Medications:     albuterol (PROVENTIL HFA,VENTOLIN HFA) 90 mcg/act inhaler, INHALE 1 PUFF EVERY 2-3 HOURS AS NEEDED FOR WHEEZING, Disp: , Rfl:     aspirin (Adult Aspirin Regimen) 81 mg EC tablet, Take 81 mg by mouth, Disp: , Rfl:     EPINEPHrine (EPIPEN) 0.3 mg/0.3 mL SOAJ, Inject 0.3 mg into a muscle every 8 (eight) hours as needed for anaphylaxis, Disp: , Rfl:     metoprolol succinate (TOPROL-XL) 25 mg 24 hr tablet, Take 3 tablets (75 mg total) by mouth 2 (two) times a day, Disp: 270 tablet, Rfl: 4    norethindrone (Incassia) 0.35 MG tablet, Take 1 tablet (0.35 mg total) by mouth daily, Disp: 84 tablet, Rfl: 4    omeprazole (PriLOSEC) 20 mg delayed release capsule, Take 20 mg by mouth, Disp: , Rfl:      "Ubrogepant (UBRELVY) 100 MG tablet, Take 1 tablet (100 mg) one time as needed for migraine. May repeat one additional tablet (100 mg) at least two hours after the first dose. Do not use more than two doses per day, or for more than eight days per month., Disp: 8 tablet, Rfl: 1      Wt Readings from Last 3 Encounters:   01/27/25 97.5 kg (215 lb)   01/07/25 96.6 kg (213 lb)   11/27/24 96.1 kg (211 lb 12.8 oz)     Temp Readings from Last 3 Encounters:   09/30/24 97.7 °F (36.5 °C) (Temporal)   09/18/24 (!) 103.2 °F (39.6 °C) (Oral)   05/26/23 98.2 °F (36.8 °C)     BP Readings from Last 3 Encounters:   01/27/25 116/70   01/07/25 128/82   11/27/24 128/66     Pulse Readings from Last 3 Encounters:   01/27/25 67   10/10/24 64   09/18/24 (!) 120         Physical Exam  HENT:      Head: Atraumatic.      Mouth/Throat:      Mouth: Mucous membranes are moist.   Eyes:      Extraocular Movements: Extraocular movements intact.   Cardiovascular:      Rate and Rhythm: Normal rate and regular rhythm.      Heart sounds: Normal heart sounds.   Pulmonary:      Effort: Pulmonary effort is normal.      Breath sounds: Normal breath sounds.   Abdominal:      General: Abdomen is flat.   Musculoskeletal:         General: Normal range of motion.      Cervical back: Normal range of motion.   Skin:     General: Skin is warm.   Neurological:      General: No focal deficit present.      Mental Status: She is alert and oriented to person, place, and time.   Psychiatric:         Mood and Affect: Mood normal.         Behavior: Behavior normal.           Laboratory Studies:  CMP:  Lab Results   Component Value Date    K 3.5 05/26/2023     05/26/2023    CO2 27 05/26/2023    BUN 15 05/26/2023    CREATININE 0.69 05/26/2023    AST 18 05/26/2023    ALT 27 05/26/2023    EGFR 110 05/26/2023       Lipid Profile:   No results found for: \"CHOL\"  Lab Results   Component Value Date    HDL 43 (L) 01/08/2025     Lab Results   Component Value Date    LDLCALC 139 " (H) 01/08/2025     Lab Results   Component Value Date    TRIG 170 (H) 01/08/2025

## 2025-02-21 ENCOUNTER — NURSE TRIAGE (OUTPATIENT)
Age: 40
End: 2025-02-21

## 2025-02-21 NOTE — TELEPHONE ENCOUNTER
"Reason for Conversation: received call from pt.  Last night she was feeling fluttering.  She was monitoring pulse on her smart watch, and it was showing HR in the 50's.  This morning she saw that her HR was as low as 45 bpm overnight.  Today HR is hovering in the 50-60's.  Normally it is in the 80's.  She is getting lightheaded intermittently when it drops into the 40-50's.  Patient takes metoprolol succinate 75 mg BID.  Pt states she stays well hydrated. Advised pt I would send a message to Dr. Byrnes to review.  Advised pt to notify office with any worsening symptoms.     VS/Weight: (Note: Please include date/time vitals/weight were measured)  HR 40-50's last night, today 50-60's     Pain: No    Risk Factors: Hypertension and Arrhythmia- NSVT, PFO, bicuspid aortic valve    Recent relevant testing and date of testing: Echo 8/23/24    Medication: metoprolol succinate 75 mg BID, asa 81 mg qd    Upcoming Office Visit: Yes    Last Office Visit: 1/27/25      Reason for Disposition   Heart rhythm alert (e.g., 'you have irregular heartbeat') from personal wearable device (e.g., Apple Watch)    Answer Assessment - Initial Assessment Questions  1. DESCRIPTION: \"Please describe your heart rate or heartbeat that you are having\" (e.g., fast/slow, regular/irregular, skipped or extra beats, \"palpitations\")      Fluttering   2. ONSET: \"When did it start?\" (e.g., minutes, hours, days)       Last night  3. DURATION: \"How long does it last\" (e.g., seconds, minutes, hours)      Since last night  4. PATTERN \"Does it come and go, or has it been constant since it started?\"  \"Does it get worse with exertion?\"   \"Are you feeling it now?\"      Comes and goes   6. HEART RATE: \"Can you tell me your heart rate?\" \"How many beats in 15 seconds?\"  Note: Not all patients can do this.        HR 45-60's, normally 80's  7. RECURRENT SYMPTOM: \"Have you ever had this before?\" If Yes, ask: \"When was the last time?\" and \"What happened that time?\"      " " Denies, normal HR 80's  8. CAUSE: \"What do you think is causing the palpitations?\"      Unsure   9. CARDIAC HISTORY: \"Do you have any history of heart disease?\" (e.g., heart attack, angina, bypass surgery, angioplasty, arrhythmia)       NSVT, HTN  10. OTHER SYMPTOMS: \"Do you have any other symptoms?\" (e.g., dizziness, chest pain, sweating, difficulty breathing)        Lightheaded,   11. PREGNANCY: \"Is there any chance you are pregnant?\" \"When was your last menstrual period?\"        Denies    Protocols used: Heart Rate and Heartbeat Questions-Adult-[place]    "

## 2025-04-10 ENCOUNTER — APPOINTMENT (EMERGENCY)
Dept: CT IMAGING | Facility: HOSPITAL | Age: 40
End: 2025-04-10
Payer: COMMERCIAL

## 2025-04-10 ENCOUNTER — HOSPITAL ENCOUNTER (EMERGENCY)
Facility: HOSPITAL | Age: 40
Discharge: HOME | End: 2025-04-10
Attending: EMERGENCY MEDICINE
Payer: COMMERCIAL

## 2025-04-10 ENCOUNTER — NURSE TRIAGE (OUTPATIENT)
Age: 40
End: 2025-04-10

## 2025-04-10 VITALS
SYSTOLIC BLOOD PRESSURE: 154 MMHG | TEMPERATURE: 98.5 F | HEART RATE: 60 BPM | BODY MASS INDEX: 34.99 KG/M2 | RESPIRATION RATE: 17 BRPM | WEIGHT: 210 LBS | DIASTOLIC BLOOD PRESSURE: 94 MMHG | HEIGHT: 65 IN | OXYGEN SATURATION: 98 %

## 2025-04-10 DIAGNOSIS — H53.143 PHOTOPHOBIA OF BOTH EYES: ICD-10-CM

## 2025-04-10 DIAGNOSIS — R11.0 NAUSEA: ICD-10-CM

## 2025-04-10 DIAGNOSIS — G43.909 MIGRAINE HEADACHE: Primary | ICD-10-CM

## 2025-04-10 LAB
ANION GAP SERPL CALCULATED.3IONS-SCNC: 6 MMOL/L (ref 4–13)
BASOPHILS # BLD AUTO: 0.05 THOUSANDS/ÂΜL (ref 0–0.1)
BASOPHILS NFR BLD AUTO: 1 % (ref 0–1)
BUN SERPL-MCNC: 15 MG/DL (ref 5–25)
CALCIUM SERPL-MCNC: 9.2 MG/DL (ref 8.4–10.2)
CHLORIDE SERPL-SCNC: 102 MMOL/L (ref 96–108)
CO2 SERPL-SCNC: 28 MMOL/L (ref 21–32)
CREAT SERPL-MCNC: 0.62 MG/DL (ref 0.6–1.3)
EOSINOPHIL # BLD AUTO: 0.13 THOUSAND/ÂΜL (ref 0–0.61)
EOSINOPHIL NFR BLD AUTO: 1 % (ref 0–6)
ERYTHROCYTE [DISTWIDTH] IN BLOOD BY AUTOMATED COUNT: 12.8 % (ref 11.6–15.1)
GFR SERPL CREATININE-BSD FRML MDRD: 113 ML/MIN/1.73SQ M
GLUCOSE SERPL-MCNC: 99 MG/DL (ref 65–140)
HCG SERPL QL: NEGATIVE
HCT VFR BLD AUTO: 41 % (ref 34.8–46.1)
HGB BLD-MCNC: 13.3 G/DL (ref 11.5–15.4)
IMM GRANULOCYTES # BLD AUTO: 0.07 THOUSAND/UL (ref 0–0.2)
IMM GRANULOCYTES NFR BLD AUTO: 1 % (ref 0–2)
LYMPHOCYTES # BLD AUTO: 2.16 THOUSANDS/ÂΜL (ref 0.6–4.47)
LYMPHOCYTES NFR BLD AUTO: 21 % (ref 14–44)
MCH RBC QN AUTO: 29.7 PG (ref 26.8–34.3)
MCHC RBC AUTO-ENTMCNC: 32.4 G/DL (ref 31.4–37.4)
MCV RBC AUTO: 92 FL (ref 82–98)
MONOCYTES # BLD AUTO: 0.64 THOUSAND/ÂΜL (ref 0.17–1.22)
MONOCYTES NFR BLD AUTO: 6 % (ref 4–12)
NEUTROPHILS # BLD AUTO: 7.48 THOUSANDS/ÂΜL (ref 1.85–7.62)
NEUTS SEG NFR BLD AUTO: 70 % (ref 43–75)
NRBC BLD AUTO-RTO: 0 /100 WBCS
PLATELET # BLD AUTO: 272 THOUSANDS/UL (ref 149–390)
PMV BLD AUTO: 10.9 FL (ref 8.9–12.7)
POTASSIUM SERPL-SCNC: 3.7 MMOL/L (ref 3.5–5.3)
RBC # BLD AUTO: 4.48 MILLION/UL (ref 3.81–5.12)
SODIUM SERPL-SCNC: 136 MMOL/L (ref 135–147)
WBC # BLD AUTO: 10.53 THOUSAND/UL (ref 4.31–10.16)

## 2025-04-10 PROCEDURE — 99284 EMERGENCY DEPT VISIT MOD MDM: CPT

## 2025-04-10 PROCEDURE — 96375 TX/PRO/DX INJ NEW DRUG ADDON: CPT

## 2025-04-10 PROCEDURE — 99285 EMERGENCY DEPT VISIT HI MDM: CPT | Performed by: PHYSICIAN ASSISTANT

## 2025-04-10 PROCEDURE — 85025 COMPLETE CBC W/AUTO DIFF WBC: CPT | Performed by: PHYSICIAN ASSISTANT

## 2025-04-10 PROCEDURE — 36415 COLL VENOUS BLD VENIPUNCTURE: CPT | Performed by: PHYSICIAN ASSISTANT

## 2025-04-10 PROCEDURE — 84703 CHORIONIC GONADOTROPIN ASSAY: CPT | Performed by: PHYSICIAN ASSISTANT

## 2025-04-10 PROCEDURE — 70450 CT HEAD/BRAIN W/O DYE: CPT

## 2025-04-10 PROCEDURE — 80048 BASIC METABOLIC PNL TOTAL CA: CPT | Performed by: PHYSICIAN ASSISTANT

## 2025-04-10 PROCEDURE — 96365 THER/PROPH/DIAG IV INF INIT: CPT

## 2025-04-10 RX ORDER — DEXAMETHASONE SODIUM PHOSPHATE 10 MG/ML
10 INJECTION, SOLUTION INTRAMUSCULAR; INTRAVENOUS ONCE
Status: COMPLETED | OUTPATIENT
Start: 2025-04-10 | End: 2025-04-10

## 2025-04-10 RX ORDER — DIPHENHYDRAMINE HYDROCHLORIDE 50 MG/ML
25 INJECTION, SOLUTION INTRAMUSCULAR; INTRAVENOUS ONCE
Status: COMPLETED | OUTPATIENT
Start: 2025-04-10 | End: 2025-04-10

## 2025-04-10 RX ORDER — KETOROLAC TROMETHAMINE 30 MG/ML
15 INJECTION, SOLUTION INTRAMUSCULAR; INTRAVENOUS ONCE
Status: COMPLETED | OUTPATIENT
Start: 2025-04-10 | End: 2025-04-10

## 2025-04-10 RX ORDER — ACETAMINOPHEN 10 MG/ML
1000 INJECTION, SOLUTION INTRAVENOUS ONCE
Status: COMPLETED | OUTPATIENT
Start: 2025-04-10 | End: 2025-04-10

## 2025-04-10 RX ORDER — MAGNESIUM SULFATE HEPTAHYDRATE 40 MG/ML
2 INJECTION, SOLUTION INTRAVENOUS ONCE
Status: COMPLETED | OUTPATIENT
Start: 2025-04-10 | End: 2025-04-10

## 2025-04-10 RX ORDER — METOCLOPRAMIDE HYDROCHLORIDE 5 MG/ML
10 INJECTION INTRAMUSCULAR; INTRAVENOUS ONCE
Status: COMPLETED | OUTPATIENT
Start: 2025-04-10 | End: 2025-04-10

## 2025-04-10 RX ADMIN — ACETAMINOPHEN 1000 MG: 1000 INJECTION, SOLUTION INTRAVENOUS at 13:34

## 2025-04-10 RX ADMIN — SODIUM CHLORIDE 1000 ML: 0.9 INJECTION, SOLUTION INTRAVENOUS at 13:34

## 2025-04-10 RX ADMIN — METOCLOPRAMIDE 10 MG: 5 INJECTION, SOLUTION INTRAMUSCULAR; INTRAVENOUS at 13:34

## 2025-04-10 RX ADMIN — KETOROLAC TROMETHAMINE 15 MG: 30 INJECTION, SOLUTION INTRAMUSCULAR; INTRAVENOUS at 15:26

## 2025-04-10 RX ADMIN — MAGNESIUM SULFATE HEPTAHYDRATE 2 G: 40 INJECTION, SOLUTION INTRAVENOUS at 13:34

## 2025-04-10 RX ADMIN — DEXAMETHASONE SODIUM PHOSPHATE 10 MG: 10 INJECTION, SOLUTION INTRAMUSCULAR; INTRAVENOUS at 13:34

## 2025-04-10 RX ADMIN — DIPHENHYDRAMINE HYDROCHLORIDE 25 MG: 50 INJECTION, SOLUTION INTRAMUSCULAR; INTRAVENOUS at 13:34

## 2025-04-10 NOTE — DISCHARGE INSTRUCTIONS
"Patient Education     Migraine in adults   The Basics   Written by the doctors and editors at [facility]   What is migraine? -- This is a condition that involves a headache as well as other symptoms.  Migraine can affect both adults and children. It is more common in females. Migraine headaches, or \"attacks,\" often start mild and then get worse.  What are the symptoms of migraine in adults? -- Symptoms can include:   Headache - The headache gets worse over several hours and is usually throbbing. It often affects 1 side of the head.   Nausea and sometimes vomiting   Sensitivity to light and noise - Lying down in a quiet, dark room often helps.   Aura - Some people have something called a migraine \"aura.\" This is a symptom or feeling that happens before or during the migraine attack. The aura usually lasts a few minutes to an hour and then goes away. For most people, it lasts 15 to 30 minutes.  Each person's aura is different, but in most cases, the aura affects your vision. During an aura, you might:   See flashing lights, bright spots, or zigzag lines   Lose part of your vision   Have numbness and tingling of the lips, lower face, and fingers of 1 hand   Hear sounds or have ringing in their ears  People who get migraine with aura usually cannot take birth control pills that have estrogen. That's because they might increase the risk of stroke.  Many people get other symptoms of migraine. These symptoms can happen several hours or even a day before the headache. Doctors call these \"premonitory\" or \"prodromal\" symptoms. They might include yawning, feeling depressed, irritability, food cravings, constipation, or a stiff neck.  Will I need tests? -- Probably not. Your doctor or nurse will ask you questions and do an exam.  Can migraine attacks be prevented? -- Yes. Some people find that their migraine attacks are triggered by certain things. If you can avoid some of these things, you can lower your chances of getting " "migraine attacks.  You can also keep a \"headache diary.\" In the diary, write down:   Every time you have a migraine attack   What you ate and did before it started - This can help you decide if there is anything you should avoid eating or doing.   What medicine you took, and if it helped  Common migraine triggers include:   Stress   Hormonal changes   Skipping meals or not eating enough   Changes in the weather   Sleeping too much or too little   Bright or flashing lights   Drinking alcohol   Eating certain foods, such as aged cheese and hot dogs   Smoking or being around smoke  If your migraine attacks are frequent or severe, your doctor can suggest other ways to help prevent them. For example, it might help to learn relaxation techniques and ways to manage stress. There are also medicines that can help.  Some people get migraine attacks just before or during their period. Medicine can help with this, too.  How are migraine attacks treated? -- There are many different medicines that can help with migraine. Your doctor can help you find the best treatment for your situation.  For mild migraine, your doctor might suggest an over-the-counter medicine to take during a migraine attack. These include:   Acetaminophen (sample brand name: Tylenol)   Ibuprofen (sample brand names: Advil, Motrin)   Naproxen (sample brand name: Aleve)   A medicine that combines acetaminophen, aspirin, and caffeine (sample brand name: Excedrin)  For more severe migraine, there are prescription medicines that can help. Some, such as medicines called triptans, help relieve the pain from a migraine attack. Other prescription medicines can help make migraine attacks happen less often. If you have severe nausea or vomiting with your migraine attacks, there are medicines that can help with that, too.  Do not try to treat frequent migraine attacks on your own with non-prescription pain medicines. Taking these too often can actually cause more " headaches later.  What else can I do to feel better? -- You can try:   Resting in a quiet, dark room with a cool cloth on your forehead   Sleeping   Taking the medicine or medicines that your doctor suggested - Do not take medicines that you have not discussed with your doctor.  What if I want to get pregnant? -- Talk to your doctor or nurse before you start trying. Some medicines used to treat and prevent migraine are not safe during pregnancy, so you might need to switch medicines before you get pregnant.  Some people notice that their migraine symptoms actually get better during pregnancy and breastfeeding. This is related to hormonal changes in the body.  When should I call the doctor? -- Call your doctor or nurse if:    You think that you are having migraine attacks.   Your migraine attacks get worse or more frequent.   You have new symptoms.  All topics are updated as new evidence becomes available and our peer review process is complete.  This topic retrieved from [employer] on: May 18, 2024.  Topic 951746 Version 9.0  Release: 32.4.3 - C32.137  © 2024 [employer], [employer] and/or its affiliates. All rights reserved.  Consumer Information Use and Disclaimer   Disclaimer: This generalized information is a limited summary of diagnosis, treatment, and/or medication information. It is not meant to be comprehensive and should be used as a tool to help the user understand and/or assess potential diagnostic and treatment options. It does NOT include all information about conditions, treatments, medications, side effects, or risks that may apply to a specific patient. It is not intended to be medical advice or a substitute for the medical advice, diagnosis, or treatment of a health care provider based on the health care provider's examination and assessment of a patient's specific and unique circumstances. Patients must speak with a health care provider for complete information about their health, medical questions, and  treatment options, including any risks or benefits regarding use of medications. This information does not endorse any treatments or medications as safe, effective, or approved for treating a specific patient. Up[employer], Inc[employer] and its affiliates disclaim any warranty or liability relating to this information or the use thereof.The use of this information is governed by the Terms of Use, available at https://www.[employer].com/en/know/clinical-effectiveness-terms. 2024© Up[employer]Date, Inc. and its affiliates and/or licensors. All rights reserved.  Copyright   © 2024 [employer], [employer]. and/or its affiliates. All rights reserved.  Reading Physician Reading Date Result Priority   Jabari Leach MD  [phone]     4/10/2025      Narrative & Impression  CT BRAIN - WITHOUT CONTRAST     INDICATION:   frontal head pain; hx of ocular migraines.     COMPARISON: CTA of the head and neck 5/24/2023.     TECHNIQUE:  CT examination of the brain was performed.  Multiplanar 2D reformatted images were created from the source data.     Radiation dose length product (DLP) for this visit:  865.64 mGy-cm .  This examination, like all CT scans performed in the [facility], was performed utilizing techniques to minimize radiation dose exposure, including the use of iterative   reconstruction and automated exposure control.     IMAGE QUALITY:  Diagnostic.     FINDINGS:     PARENCHYMA:No intracranial mass, mass effect or midline shift. No CT signs of acute infarction.  No acute parenchymal hemorrhage.     VENTRICLES AND EXTRA-AXIAL SPACES:  Normal for the patient's age.     VISUALIZED ORBITS:  Normal visualized orbits.     PARANASAL SINUSES:  Normal visualized paranasal sinuses.     CALVARIUM AND EXTRACRANIAL SOFT TISSUES:  Normal.     IMPRESSION:     No acute intracranial abnormality.                 Workstation performed: [id number]

## 2025-04-10 NOTE — ED PROVIDER NOTES
"Time reflects when diagnosis was documented in both MDM as applicable and the Disposition within this note       Time User Action Codes Description Comment    4/10/2025  3:22 PM Layo Lee [G43.909] Migraine headache     4/10/2025  3:22 PM Layo Lee [H53.143] Photophobia of both eyes     4/10/2025  3:23 PM Layo Lee [R11.0] Nausea           ED Disposition       ED Disposition   Discharge    Condition   Stable    Date/Time   Thu Apr 10, 2025  3:21 PM    Comment   Farhana Thomson discharge to home/self care.                   Assessment & Plan       Medical Decision Making  Patient is a 39-year-old female with a history of ocular migraines, hypertension, PCOS, rib bone resection that presents to the Emergency Department with gradual onset dull aching persistent worsening frontal head pain with generalized distribution/radiation to head for the past 3 days.   Patient hemodynamically stable and afebrile  No sirs  Hemodynamically stable and afebrile  GCS 15, AAOx3, no acute focal neurological deficits  clinical blood labs normal electrolytes, normal kidney function, normal glucose  Negative serum pregnancy  Mild leukocytosis, no bandemia  Head CT without contrast-impression-\"no acute intracranial abnormality.\"  Delivered one liter bolus of nsl delivered over the course of one hour  Delivered toradol, benadryl, and Reglan  Delivered Decadron, tylenol, and magnesium  Avoid migraine triggers  Follow up with neurology  Follow up with PCP  Follow up with the Emergency Department if symptoms persist or exacerbate       Patient demonstrates verbal understanding of all clinical laboratory and imaging findings, discharge instructions, follow-up, and verbally agrees with current treatment plan with teach back    *Due to voice recognition software, sound alike and misspelled words may be contained in the documentation*      Amount and/or Complexity of Data Reviewed  Labs: ordered. Decision-making " details documented in ED Course.  Radiology: ordered and independent interpretation performed. Decision-making details documented in ED Course.    Risk  Prescription drug management.        ED Course as of 04/10/25 1846   u Apr 10, 2025   1526 Patient reports improvement of head pain symptoms status post medication delivery       Medications   sodium chloride 0.9 % bolus 1,000 mL (0 mL Intravenous Stopped 4/10/25 1434)   metoclopramide (REGLAN) injection 10 mg (10 mg Intravenous Given 4/10/25 1334)   diphenhydrAMINE (BENADRYL) injection 25 mg (25 mg Intravenous Given 4/10/25 1334)   dexamethasone (PF) (DECADRON) injection 10 mg (10 mg Intravenous Given 4/10/25 1334)   magnesium sulfate 2 g/50 mL IVPB (premix) 2 g (0 g Intravenous Stopped 4/10/25 1404)   acetaminophen (Ofirmev) injection 1,000 mg (0 mg Intravenous Stopped 4/10/25 1349)   ketorolac (TORADOL) injection 15 mg (15 mg Intravenous Given 4/10/25 1526)       ED Risk Strat Scores                    No data recorded                            History of Present Illness       Chief Complaint   Patient presents with    Migraine     Migraine x3 days, states she hasn't had one this bad for years, neurologist referred to ED, no meds taken today     Patient is a 39-year-old female with a history of ocular migraines, hypertension, PCOS, rib bone resection that presents to the Emergency Department with gradual onset dull aching persistent worsening frontal head pain with generalized distribution/radiation to head for the past 3 days.  Patient has associated symptomatology of nausea vomiting and light sensitivity beginning with the current ED presentation of frontal head pain.  Patient states that she has a history of ocular migraines but the last ocular migraine she had was 5 years ago.  Patient states that she follows up with neurology for migraines as an outpatient.  Patient denies numbness, tingling or loss of power in any oral extremities.  Patient denies  palliative factors with provocative factors of looking at light.  Patient denies noneffective treatment.  Patient denies fevers, chills, vomiting, diarrhea, constipation and urinary symptoms.  Patient denies recent fall recent trauma.  Patient denies sick contacts recent travel.  Patient denies vision loss.  Patient denies chest pain, shortness of breath, and abdominal pain.    Past Medical History:   Diagnosis Date    Acid reflux     Bicuspid aortic valve     COVID-19 affecting pregnancy in second trimester 12/12/2021    COVID-19 affecting pregnancy in third trimester 01/20/2022    Hospitalized 12/12-12/19/2021 with Covid pneumonia    Depression 2004    in 2004 after termination of pregnancy. was tx with zoloft     Diabetes mellitus (HCC)     prediabetic    Female infertility     Gestational hypertension 2011    third trimester with her 1st baby. del @ 38 weeks    Gestational hypertension, third trimester 01/27/2022    First pregnancy, IOL for gestational HTN 9/16/2021 baseline LFTs and creatinine normal 10/6/2021 24hr urine collection 147mg  Patient taking ASA 162mg daily - stopped ASA 3/14/2022  Seen L&D 3/12/2022 - one elevated BP, others normal - labs  Normal.  D/c home w/ plan for f/u in office 3/15/2022.    High-risk pregnancy     HX DVT (deep venous thrombosis) (HCC)     in right axilla -- complication from thoracic outlet syndrome    Hypertension     Hypertension in pregnancy, preeclampsia, severe, delivered/postpartum 04/06/2022    Patient with gestational HTN in pregnancy and induced for same at 37 weeks. Labs were normal and she continued to have mildly elevated BP after delivery. PPD#16 she presented with blood pressures 150/100 and a headache for 4 days.  She does have h/o migraines. Admitted for severe preeclampsia and started on magnesium.    Migraine     PCOS (polycystic ovarian syndrome) 2010    initally diagnosed in 2010 tx with metformin. pt d/c med and then restarted in 2016    Pituitary  microadenoma (HCC) 2009    last MRI in 2009    Prediabetes 2016    'impaired fasting glucose'     Thoracic outlet syndrome 2004    in 2004 per [facility] records; has surgical repair      Past Surgical History:   Procedure Laterality Date    BONE RESECTION, RIB Right 2004    BREAST BIOPSY Left 10/10/2024    US GUIDED BREAST BIOPSY LEFT COMPLETE Left 10/10/2024    WISDOM TOOTH EXTRACTION Bilateral       Family History   Problem Relation Age of Onset    Deep vein thrombosis Mother     Diabetes Mother     Kidney cancer Father 60    Cancer Father         Kidney cancer    Diabetes Father     Heart disease Father         Permanent afib    Stroke Father     No Known Problems Sister     No Known Problems Daughter     Colon cancer Maternal Grandmother     No Known Problems Maternal Grandfather     Cancer Paternal Grandmother         unknown primary    No Known Problems Paternal Grandfather     Cancer Paternal Aunt         unknown primary    Cancer Paternal Aunt         unknown primary    Cancer Paternal Aunt         unknown primary    Diabetes Brother     Seizures Brother       Social History     Tobacco Use    Smoking status: Never     Passive exposure: Never    Smokeless tobacco: Never   Vaping Use    Vaping status: Never Used   Substance Use Topics    Alcohol use: Not Currently     Comment: social    Drug use: Never      E-Cigarette/Vaping    E-Cigarette Use Never User       E-Cigarette/Vaping Substances    Nicotine No     THC No     CBD Yes before pregnancy for anxiety    Flavoring No       I have reviewed and agree with the history as documented.       History provided by:  Patient   used: No    Migraine  Associated symptoms: headaches    Associated symptoms: no abdominal pain, no chest pain, no congestion, no cough, no diarrhea, no ear pain, no fever, no nausea, no rash, no rhinorrhea, no shortness of breath, no sore throat and no vomiting        Review of Systems   Constitutional:  Negative for  "activity change, appetite change, chills and fever.   HENT:  Negative for congestion, ear pain, postnasal drip, rhinorrhea, sinus pressure, sinus pain, sore throat and tinnitus.    Eyes:  Positive for photophobia. Negative for pain and visual disturbance.   Respiratory:  Negative for cough, chest tightness and shortness of breath.    Cardiovascular:  Negative for chest pain and palpitations.   Gastrointestinal:  Negative for abdominal pain, constipation, diarrhea, nausea and vomiting.   Genitourinary:  Negative for difficulty urinating, dysuria, flank pain, frequency, hematuria and urgency.   Musculoskeletal:  Negative for arthralgias, back pain, gait problem, neck pain and neck stiffness.   Skin:  Negative for color change, pallor and rash.   Allergic/Immunologic: Negative for environmental allergies and food allergies.   Neurological:  Positive for headaches. Negative for dizziness, seizures, syncope, weakness and numbness.   Psychiatric/Behavioral:  Negative for confusion.    All other systems reviewed and are negative.          Objective       ED Triage Vitals [04/10/25 1231]   Temperature Pulse Blood Pressure Respirations SpO2 Patient Position - Orthostatic VS   98.5 °F (36.9 °C) 60 168/96 16 99 % Sitting      Temp Source Heart Rate Source BP Location FiO2 (%) Pain Score    Oral Monitor Left arm -- 10 - Worst Possible Pain      Vitals      Date and Time Temp Pulse SpO2 Resp BP Pain Score FACES Pain Rating User   04/10/25 1345 98.5 °F (36.9 °C) 60 98 % 17 154/94 -- -- CAC   04/10/25 1231 98.5 °F (36.9 °C) 60 99 % 16 168/96 10 - Worst Possible Pain -- MS            Physical Exam  Vitals and nursing note reviewed.   Constitutional:       General: She is awake.      Appearance: Normal appearance. She is well-developed and normal weight. She is not ill-appearing, toxic-appearing or diaphoretic.      Comments: /94   Pulse 60   Temp 98.5 °F (36.9 °C) (Oral)   Resp 17   Ht 5' 5\" (1.651 m)   Wt 95.3 kg (210 " lb)   LMP 04/10/2025   SpO2 98%   BMI 34.95 kg/m²      HENT:      Head: Normocephalic and atraumatic.      Jaw: There is normal jaw occlusion.      Right Ear: Hearing, tympanic membrane, ear canal and external ear normal. No decreased hearing noted. No drainage, swelling or tenderness. No mastoid tenderness.      Left Ear: Hearing, tympanic membrane, ear canal and external ear normal. No decreased hearing noted. No drainage, swelling or tenderness. No mastoid tenderness.      Nose: Nose normal.      Mouth/Throat:      Lips: Pink.      Mouth: Mucous membranes are moist.      Pharynx: Oropharynx is clear. Uvula midline.   Eyes:      General: Lids are normal. Vision grossly intact. Gaze aligned appropriately.         Right eye: No discharge.         Left eye: No discharge.      Extraocular Movements: Extraocular movements intact.      Conjunctiva/sclera: Conjunctivae normal.      Pupils: Pupils are equal, round, and reactive to light.   Neck:      Vascular: No JVD.      Trachea: Trachea and phonation normal. No tracheal tenderness or tracheal deviation.      Comments: No midline tenderness, no stepoffs  No tenderness with passive and active cervical ROM with head turning approximately 45 degree angles to the left and right  No cervical tenderness with cranial axial loading    Cardiovascular:      Rate and Rhythm: Normal rate and regular rhythm.      Pulses: Normal pulses.           Radial pulses are 2+ on the right side and 2+ on the left side.        Posterior tibial pulses are 2+ on the right side and 2+ on the left side.      Heart sounds: Normal heart sounds.   Pulmonary:      Effort: Pulmonary effort is normal.      Breath sounds: Normal breath sounds and air entry. No stridor. No decreased breath sounds, wheezing, rhonchi or rales.   Chest:      Chest wall: No tenderness.   Abdominal:      General: Abdomen is flat. Bowel sounds are normal. There is no distension.      Palpations: Abdomen is soft. Abdomen is  not rigid.      Tenderness: There is no abdominal tenderness. There is no guarding or rebound.   Musculoskeletal:         General: Normal range of motion.      Cervical back: Full passive range of motion without pain, normal range of motion and neck supple. No rigidity. No spinous process tenderness or muscular tenderness. Normal range of motion.      Comments: Passive ROM intact  Upper and lower extremity 5/5 bilaterally  Neurovascularly intact  No grinding or clicking of joints       Feet:      Right foot:      Toenail Condition: Right toenails are normal.      Left foot:      Toenail Condition: Left toenails are normal.   Lymphadenopathy:      Head:      Right side of head: No submental, submandibular, tonsillar, preauricular, posterior auricular or occipital adenopathy.      Left side of head: No submental, submandibular, tonsillar, preauricular, posterior auricular or occipital adenopathy.      Cervical: No cervical adenopathy.      Right cervical: No superficial, deep or posterior cervical adenopathy.     Left cervical: No superficial, deep or posterior cervical adenopathy.   Skin:     General: Skin is warm.      Capillary Refill: Capillary refill takes less than 2 seconds.      Findings: No rash.   Neurological:      General: No focal deficit present.      Mental Status: She is alert and oriented to person, place, and time. Mental status is at baseline.      GCS: GCS eye subscore is 4. GCS verbal subscore is 5. GCS motor subscore is 6.      Cranial Nerves: Cranial nerves 2-12 are intact.      Sensory: Sensation is intact. No sensory deficit.      Motor: Motor function is intact.      Coordination: Coordination is intact.      Gait: Gait is intact.      Deep Tendon Reflexes: Reflexes are normal and symmetric.      Reflex Scores:       Patellar reflexes are 2+ on the right side and 2+ on the left side.  Psychiatric:         Attention and Perception: Attention normal.         Mood and Affect: Mood normal.          Speech: Speech normal.         Behavior: Behavior normal. Behavior is cooperative.         Thought Content: Thought content normal.         Judgment: Judgment normal.         Results Reviewed       Procedure Component Value Units Date/Time    hCG, qualitative pregnancy [[id number]]  (Normal) Collected: 04/10/25 1335    Lab Status: Final result Specimen: Blood from Arm, Left Updated: 04/10/25 1410     Preg, Serum Negative    Basic metabolic panel [[id number]] Collected: 04/10/25 1335    Lab Status: Final result Specimen: Blood from Arm, Left Updated: 04/10/25 1409     Sodium 136 mmol/L      Potassium 3.7 mmol/L      Chloride 102 mmol/L      CO2 28 mmol/L      ANION GAP 6 mmol/L      BUN 15 mg/dL      Creatinine 0.62 mg/dL      Glucose 99 mg/dL      Calcium 9.2 mg/dL      eGFR 113 ml/min/1.73sq m     Narrative:      National Kidney Disease Foundation guidelines for Chronic Kidney Disease (CKD):     Stage 1 with normal or high GFR (GFR > 90 mL/min/1.73 square meters)    Stage 2 Mild CKD (GFR = 60-89 mL/min/1.73 square meters)    Stage 3A Moderate CKD (GFR = 45-59 mL/min/1.73 square meters)    Stage 3B Moderate CKD (GFR = 30-44 mL/min/1.73 square meters)    Stage 4 Severe CKD (GFR = 15-29 mL/min/1.73 square meters)    Stage 5 End Stage CKD (GFR <15 mL/min/1.73 square meters)  Note: GFR calculation is accurate only with a steady state creatinine    CBC and differential [[id number]]  (Abnormal) Collected: 04/10/25 1335    Lab Status: Final result Specimen: Blood from Arm, Left Updated: 04/10/25 1346     WBC 10.53 Thousand/uL      RBC 4.48 Million/uL      Hemoglobin 13.3 g/dL      Hematocrit 41.0 %      MCV 92 fL      MCH 29.7 pg      MCHC 32.4 g/dL      RDW 12.8 %      MPV 10.9 fL      Platelets 272 Thousands/uL      nRBC 0 /100 WBCs      Segmented % 70 %      Immature Grans % 1 %      Lymphocytes % 21 %      Monocytes % 6 %      Eosinophils Relative 1 %      Basophils Relative 1 %      Absolute Neutrophils 7.48  Thousands/µL      Absolute Immature Grans 0.07 Thousand/uL      Absolute Lymphocytes 2.16 Thousands/µL      Absolute Monocytes 0.64 Thousand/µL      Eosinophils Absolute 0.13 Thousand/µL      Basophils Absolute 0.05 Thousands/µL             CT head without contrast   ED Interpretation by Layo Lee PA-C (04/10 1453)   Reading Physician Reading Date Result Priority  Jabari Leach MD  [phone]    4/10/2025     Narrative & Impression  CT BRAIN - WITHOUT CONTRAST     INDICATION:   frontal head pain; hx of ocular migraines.     COMPARISON: CTA of the head and neck 5/24/2023.     TECHNIQUE:  CT examination of the brain was performed.  Multiplanar 2D reformatted images were created from the source data.     Radiation dose length product (DLP) for this visit:  865.64 mGy-cm .  This examination, like all CT scans performed in the [facility], was performed utilizing techniques to minimize radiation dose exposure, including the use of iterative   reconstruction and automated exposure control.     IMAGE QUALITY:  Diagnostic.     FINDINGS:     PARENCHYMA:No intracranial mass, mass effect or midline shift. No CT signs of acute infarction.  No acute parenchymal hemorrhage.     VENTRICLES AND EXTRA-AXIAL SPACES:  Normal for the patient's age.     VISUALIZED ORBITS:  Normal visuali   zed orbits.     PARANASAL SINUSES:  Normal visualized paranasal sinuses.     CALVARIUM AND EXTRACRANIAL SOFT TISSUES:  Normal.     IMPRESSION:     No acute intracranial abnormality.                 Workstation performed: [id number]        Final Interpretation by Jabari Leach MD (04/10 1443)      No acute intracranial abnormality.                  Workstation performed: [id number]             Procedures    ED Medication and Procedure Management   Prior to Admission Medications   Prescriptions Last Dose Informant Patient Reported? Taking?   EPINEPHrine (EPIPEN) 0.3 mg/0.3 mL SOAJ  Self Yes No   Sig: Inject  0.3 mg into a muscle every 8 (eight) hours as needed for anaphylaxis   Ubrogepant (UBRELVY) 100 MG tablet  Self No No   Sig: Take 1 tablet (100 mg) one time as needed for migraine. May repeat one additional tablet (100 mg) at least two hours after the first dose. Do not use more than two doses per day, or for more than eight days per month.   albuterol (PROVENTIL HFA,VENTOLIN HFA) 90 mcg/act inhaler  Self Yes No   Sig: INHALE 1 PUFF EVERY 2-3 HOURS AS NEEDED FOR WHEEZING   aspirin (Adult Aspirin Regimen) 81 mg EC tablet  Self Yes No   Sig: Take 81 mg by mouth   metoprolol succinate (TOPROL-XL) 25 mg 24 hr tablet  Self No No   Sig: Take 3 tablets (75 mg total) by mouth 2 (two) times a day   norethindrone (Incassia) 0.35 MG tablet  Self No No   Sig: Take 1 tablet (0.35 mg total) by mouth daily   omeprazole (PriLOSEC) 20 mg delayed release capsule  Self Yes No   Sig: Take 20 mg by mouth      Facility-Administered Medications: None     Discharge Medication List as of 4/10/2025  3:26 PM        CONTINUE these medications which have NOT CHANGED    Details   albuterol (PROVENTIL HFA,VENTOLIN HFA) 90 mcg/act inhaler INHALE 1 PUFF EVERY 2-3 HOURS AS NEEDED FOR WHEEZING, Historical Med      aspirin (Adult Aspirin Regimen) 81 mg EC tablet Take 81 mg by mouth, Starting Fri 7/19/2024, Historical Med      EPINEPHrine (EPIPEN) 0.3 mg/0.3 mL SOAJ Inject 0.3 mg into a muscle every 8 (eight) hours as needed for anaphylaxis, Historical Med      metoprolol succinate (TOPROL-XL) 25 mg 24 hr tablet Take 3 tablets (75 mg total) by mouth 2 (two) times a day, Starting Mon 8/12/2024, Normal      norethindrone (Incassia) 0.35 MG tablet Take 1 tablet (0.35 mg total) by mouth daily, Starting Wed 11/27/2024, Normal      omeprazole (PriLOSEC) 20 mg delayed release capsule Take 20 mg by mouth, Starting Fri 1/1/2021, Historical Med      Ubrogepant (UBRELVY) 100 MG tablet Take 1 tablet (100 mg) one time as needed for migraine. May repeat one  additional tablet (100 mg) at least two hours after the first dose. Do not use more than two doses per day, or for more than eight days per month., Normal           No discharge procedures on file.  ED SEPSIS DOCUMENTATION   Time reflects when diagnosis was documented in both MDM as applicable and the Disposition within this note       Time User Action Codes Description Comment    4/10/2025  3:22 PM Layo Lee [G43.909] Migraine headache     4/10/2025  3:22 PM Layo Lee [H53.143] Photophobia of both eyes     4/10/2025  3:23 PM Layo Lee [R11.0] Nausea                  Layo Lee PA-C  04/10/25 [id number]

## 2025-04-10 NOTE — TELEPHONE ENCOUNTER
Called and r/s as provider will not be adding anything else. It is also noted ins will not cover tomorrows visit due today's visit to the ED. Pt was made aware we will be cancelling tomorrow's appt, pt will be calling back to schedule f.u visit. Thank you

## 2025-04-10 NOTE — TELEPHONE ENCOUNTER
"FOLLOW UP: Pt scheduled appt with Dr Lai for tomorrow for Migraine f/u post ED visit , as she is going there today due to her worst migraine of her life.    REASON FOR CONVERSATION: Headache    SYMPTOMS: 3 day constant migraine 10/10 entire head today, worst migraine of her life, severe nausea and blurred vision with b/l eye pain    OTHER: Pt LOV 8/2023, pt did not f/u in office since as her migraine went away for over 1 year. She called in to schedule an appt with Dr Lai to discuss and get recommendations    DISPOSITION: Routed to provider as an FYI as I sent pt to ED,but also tyler her an appt with Dr Lai to be seen for f/u regarding her migraine post ED visit. As she has not been seen since Aug 2023.      Reason for Disposition   SEVERE headache, states 'worst headache' of life    Answer Assessment - Initial Assessment Questions  1. LOCATION: \"Where does it hurt?\"       Entire head and pressure behind eyes blurred vision and severe nausea that is very intense right now  2. ONSET: \"When did the headache start?\" (e.g., minutes, hours, days)       3 days ago started. Pt trying caffeine, electrolytes, ice on pack of head. Pt tried Ibuprofen 2 days ago (one dose) and today 1 dose and it has not helped.  Pt feels   3. PATTERN: \"Does the pain come and go, or has it been constant since it started?\"      Comes and goes with intensity. Just worsening in intensity.  4. SEVERITY: \"How bad is the pain?\" and \"What does it keep you from doing?\"  (e.g., Scale 1-10; mild, moderate, or severe)      10/10 Pt feels worst migraine of her life, as not had a migraine this bad in over 1 year   5. RECURRENT SYMPTOM: \"Have you ever had headaches before?\" If Yes, ask: \"When was the last time?\" and \"What happened that time?\"       Last migraine was 1 year ago  6. CAUSE: \"What do you think is causing the headache?\"      Pt feels she may be starting perimenopause, pt getting ready to move, etc she has a lot on her plate  7. " "MIGRAINE: \"Have you been diagnosed with migraine headaches?\" If Yes, ask: \"Is this headache similar?\"       Yes similar as in past, but just lasting longer  8. HEAD INJURY: \"Has there been any recent injury to the head?\"       No  9. OTHER SYMPTOMS: \"Do you have any other symptoms?\" (e.g., fever, stiff neck, eye pain, sore throat, cold symptoms)      No other symptoms at this time besides the documented ones above   10. PREGNANCY: \"Is there any chance you are pregnant?\" \"When was your last menstrual period?\"        No on birth control  LMP , pt just started spotting this am.    Protocols used: Headache-Adult-[place]    "

## 2025-04-10 NOTE — TELEPHONE ENCOUNTER
Pt currently admitted, provider requested to reschedule this appt as provider will not be adding anything else that is already provided by ED. Will attempt to contact pt and ask to essence appt, thank you

## 2025-04-15 ENCOUNTER — TELEPHONE (OUTPATIENT)
Dept: DERMATOLOGY | Facility: CLINIC | Age: 40
End: 2025-04-15

## 2025-04-15 NOTE — TELEPHONE ENCOUNTER
LMOM  with location change (Dr Le 10/08/25 [facility] to Dr Le 10/09/25 [facility]) due to her no longer going to the [facility] office, to call the office to confirm or r/s. When pt calls back schedule  appropriately, I do not have anything held.

## 2025-04-18 ENCOUNTER — RESULTS FOLLOW-UP (OUTPATIENT)
Dept: OBGYN CLINIC | Facility: CLINIC | Age: 40
End: 2025-04-18

## 2025-05-25 NOTE — TELEPHONE ENCOUNTER
Patient called on-call Ob/Gyn stating she noticed her feet were swelled yesterday and today  Patient started checking her BP's  Blood pressure ranged between 130-140/  Pt states, the most recent blood pressure taken at home while talking to me was 140/112  Pt states she also felt some dizzyness during the day  FBS=74 and 2 hr PP =124  Patient denies having RUQ pain, blurry vision, HA, N/V  Patient had growth U/S with MFM yesterday and BP was 114/72  Advised patient to present to L&D for further evaluation  No

## 2025-06-20 ENCOUNTER — OFFICE VISIT (OUTPATIENT)
Dept: URGENT CARE | Facility: CLINIC | Age: 40
End: 2025-06-20
Payer: COMMERCIAL

## 2025-06-20 VITALS
SYSTOLIC BLOOD PRESSURE: 126 MMHG | HEIGHT: 65 IN | RESPIRATION RATE: 16 BRPM | OXYGEN SATURATION: 97 % | TEMPERATURE: 98.1 F | WEIGHT: 208 LBS | BODY MASS INDEX: 34.66 KG/M2 | HEART RATE: 71 BPM | DIASTOLIC BLOOD PRESSURE: 84 MMHG

## 2025-06-20 DIAGNOSIS — M65.4 DE QUERVAIN'S TENOSYNOVITIS, RIGHT: Primary | ICD-10-CM

## 2025-06-20 PROCEDURE — 99203 OFFICE O/P NEW LOW 30 MIN: CPT | Performed by: PHYSICIAN ASSISTANT

## 2025-06-20 NOTE — PATIENT INSTRUCTIONS
Follow-up with your primary care provider in the next 3-5 days.  Any new or worsening symptoms develop get re-evaluated sooner or proceed to the ER.  Wear splint at night and for support as needed during day.  Perform gentle AROM exercises throughout the day.

## 2025-06-20 NOTE — PROGRESS NOTES
[facility]        NAME: Farhana Thomson is a 39 y.o. female  : 1985    MRN: [id number]  DATE: 2025  TIME: 10:07 AM    Assessment and Plan   De Quervain's tenosynovitis, right [M65.4]  1. De Quervain's tenosynovitis, right  Cock Up Wrist Splint    Ambulatory referral to Orthopedic Surgery        Placed in thumb spica splint by RN.   Refused xray    Patient Instructions       Follow up with PCP in 3-5 days.  Proceed to  ER if symptoms worsen.    If tests have been performed at [facility], our office will contact you with results if changes need to be made to the care plan discussed with you at the visit.  [facility]    Chief Complaint     Chief Complaint   Patient presents with    Hand Pain     Pt reports 2 days ago she developed right thumb pain radiating to the right wrist. Denies injury. Reports recently moving - lifting & moving boxes.         History of Present Illness       Patient presents with 2 days ago developing right thumb pain that's progressively getting worse. Pain at the base of the thumb/wrist area. Mild swelling over the area.   Denies any specific injury or event to cause the symptoms but did just move so was doing a lot of lifting/moving.   Denies numbness/tingling, bruising, similar occurances.     Hand Pain   Pertinent negatives include no numbness.       Review of Systems   Review of Systems   Musculoskeletal:  Positive for arthralgias, joint swelling and myalgias.   Skin:  Negative for color change.   Neurological:  Negative for weakness and numbness.         Current Medications     Current Medications[1]    Current Allergies     Allergies as of 2025 - Reviewed 2025   Allergen Reaction Noted    Gadobutrol Cough and Itching 2013    Nuts - food allergy Anaphylaxis 2021    Shellfish-derived products - food allergy Anaphylaxis 2021    Shrimp extract allergy skin test - food allergy  "Headache 03/22/2016    Venlafaxine Anaphylaxis 12/13/2021    Gadolinium derivatives Other (See Comments) 05/24/2023    Hydromorphone Itching 07/09/2013    Morphine Itching 07/09/2013    Peanut oil - food allergy Other (See Comments) 11/10/2014            The following portions of the patient's history were reviewed and updated as appropriate: allergies, current medications, past family history, past medical history, past social history, past surgical history and problem list.     Past Medical History[2]    Past Surgical History[3]    Family History[4]      Medications have been verified.        Objective   /84   Pulse 71   Temp 98.1 °F (36.7 °C)   Resp 16   Ht 5' 5\" (1.651 m)   Wt 94.3 kg (208 lb)   LMP 06/06/2025 (Approximate)   SpO2 97%   BMI 34.61 kg/m²   Patient's last menstrual period was 06/06/2025 (approximate).       Physical Exam     Physical Exam  Constitutional:       Appearance: Normal appearance.     Cardiovascular:      Pulses: Normal pulses.     Musculoskeletal:         General: Tenderness present.      Comments: Finklestein's test positive.  Active range of motion of the thumb and wrist mildly limited due to pain.  Tenderness to palpation over the radial aspect of the wrist into the base of the thumb.  No ecchymosis or deformity noted.  Neurovascularly intact distally.     Skin:     General: Skin is warm.      Capillary Refill: Capillary refill takes less than 2 seconds.      Findings: No bruising.     Neurological:      Mental Status: She is alert.     Psychiatric:         Mood and Affect: Mood normal.         Behavior: Behavior normal.                        [1]   Current Outpatient Medications:     albuterol (PROVENTIL HFA,VENTOLIN HFA) 90 mcg/act inhaler, , Disp: , Rfl:     aspirin (Adult Aspirin Regimen) 81 mg EC tablet, Take 81 mg by mouth, Disp: , Rfl:     metoprolol succinate (TOPROL-XL) 25 mg 24 hr tablet, Take 3 tablets (75 mg total) by mouth 2 (two) times a day, Disp: 270 " tablet, Rfl: 4    norethindrone (Incassia) 0.35 MG tablet, Take 1 tablet (0.35 mg total) by mouth daily, Disp: 84 tablet, Rfl: 4    omeprazole (PriLOSEC) 20 mg delayed release capsule, Take 20 mg by mouth, Disp: , Rfl:     EPINEPHrine (EPIPEN) 0.3 mg/0.3 mL SOAJ, Inject 0.3 mg into a muscle every 8 (eight) hours as needed for anaphylaxis, Disp: , Rfl:     Ubrogepant (UBRELVY) 100 MG tablet, Take 1 tablet (100 mg) one time as needed for migraine. May repeat one additional tablet (100 mg) at least two hours after the first dose. Do not use more than two doses per day, or for more than eight days per month. (Patient not taking: Reported on 6/20/2025), Disp: 8 tablet, Rfl: 1  [2]   Past Medical History:  Diagnosis Date    Acid reflux     Bicuspid aortic valve     COVID-19 affecting pregnancy in second trimester 12/12/2021    COVID-19 affecting pregnancy in third trimester 01/20/2022    Hospitalized 12/12-12/19/2021 with Covid pneumonia    Depression 2004    in 2004 after termination of pregnancy. was tx with zoloft     Diabetes mellitus (HCC)     prediabetic    Female infertility     Gestational hypertension 2011    third trimester with her 1st baby. del @ 38 weeks    Gestational hypertension, third trimester 01/27/2022    First pregnancy, IOL for gestational HTN 9/16/2021 baseline LFTs and creatinine normal 10/6/2021 24hr urine collection 147mg  Patient taking ASA 162mg daily - stopped ASA 3/14/2022  Seen L&D 3/12/2022 - one elevated BP, others normal - labs  Normal.  D/c home w/ plan for f/u in office 3/15/2022.    High-risk pregnancy     HX DVT (deep venous thrombosis) (HCC)     in right axilla -- complication from thoracic outlet syndrome    Hypertension     Hypertension in pregnancy, preeclampsia, severe, delivered/postpartum 04/06/2022    Patient with gestational HTN in pregnancy and induced for same at 37 weeks. Labs were normal and she continued to have mildly elevated BP after delivery. PPD#16 she presented  with blood pressures 150/100 and a headache for 4 days.  She does have h/o migraines. Admitted for severe preeclampsia and started on magnesium.    Migraine     PCOS (polycystic ovarian syndrome) 2010    initally diagnosed in 2010 tx with metformin. pt d/c med and then restarted in 2016    Pituitary microadenoma (HCC) 2009    last MRI in 2009    Prediabetes 2016    'impaired fasting glucose'     Thoracic outlet syndrome 2004    in 2004 per [facility] records; has surgical repair   [3]   Past Surgical History:  Procedure Laterality Date    BONE RESECTION, RIB Right 2004    BREAST BIOPSY Left 10/10/2024    US GUIDED BREAST BIOPSY LEFT COMPLETE Left 10/10/2024    WISDOM TOOTH EXTRACTION Bilateral    [4]   Family History  Problem Relation Name Age of Onset    Deep vein thrombosis Mother Erica Castañeda     Diabetes Mother Erica Castañeda     Kidney cancer Father Henrry Sheppardo 60    Cancer Father Henrry Sheppardo         Kidney cancer    Diabetes Father Henrry Sheppardo     Heart disease Father Henrry Sheppardo         Permanent afib    Stroke Father Henrry Thomson     No Known Problems Sister      No Known Problems Daughter      Colon cancer Maternal Grandmother      No Known Problems Maternal Grandfather      Cancer Paternal Grandmother Lucia Thomson         unknown primary    No Known Problems Paternal Grandfather      Cancer Paternal Aunt          unknown primary    Cancer Paternal Aunt          unknown primary    Cancer Paternal Aunt          unknown primary    Diabetes Brother CJ Garry     Seizures Brother CJ Garry

## 2025-08-15 ENCOUNTER — TELEPHONE (OUTPATIENT)
Age: 40
End: 2025-08-15